# Patient Record
Sex: FEMALE | Race: ASIAN | NOT HISPANIC OR LATINO | Employment: FULL TIME | URBAN - METROPOLITAN AREA
[De-identification: names, ages, dates, MRNs, and addresses within clinical notes are randomized per-mention and may not be internally consistent; named-entity substitution may affect disease eponyms.]

---

## 2017-04-12 ENCOUNTER — TELEPHONE (OUTPATIENT)
Dept: FAMILY MEDICINE | Facility: CLINIC | Age: 52
End: 2017-04-12

## 2017-04-12 NOTE — LETTER
[facility]  [address], [address]  [place] [facility] [address] [phone]  April 26, 2017    Vikki Medina  [address]  [facility] [address]      Dear Vikki,    I care about your health and have reviewed your health plan.  I have reviewed your medical conditions, medication list, and lab results and am making recommendations  based on this review, to better manage your health.    You are particularly in need of attention regarding:  -Cholesterol and -Diabetes    I am recommending that you:  -schedule a FOLLOWUP OFFICE APPOINTMENT with me.  I will recheck your: A1c, Lipids (fasting cholesterol - nothing to eat except water and/or meds for 8+ hours), Microablumin, TSH (thyroid test) and Creatinine tests.      Here is a list of Health Maintenance topics that are due now or due soon:  Health Maintenance Due   Topic Date Due     FOOT EXAM Q1 YEAR( NO INBASKET)  06/16/1968     EYE EXAM Q1 YEAR( NO INBASKET)  06/16/1968     MICROALBUMIN Q1 YEAR( NO INBASKET)  06/16/1968     PNEUMOVAX 1X HI RISK PATIENT < 65 (NO IB MSG)  06/16/1969     CREATININE Q1 YEAR (NO INBASKET)  10/09/2014     LIPID MONITORING Q1 YEAR( NO INBASKET)  10/09/2014     A1C Q6 MO( NO INBASKET)  06/19/2015     TSH W/ FREE T4 REFLEX Q2 YEAR (NO INBASKET)  10/09/2015       Please call us at [phone] (or use [employer]) to address the above   recommendations.    Thank you for trusting [facility] and we appreciate the opportunity to serve you.  We look forward to supporting your healthcare needs in the future.    Healthy Regards,    Tata Belle MD

## 2017-05-11 ENCOUNTER — RADIANT APPOINTMENT (OUTPATIENT)
Dept: MAMMOGRAPHY | Facility: CLINIC | Age: 52
End: 2017-05-11
Attending: NURSE PRACTITIONER
Payer: COMMERCIAL

## 2017-05-11 DIAGNOSIS — Z00.00 ROUTINE GENERAL MEDICAL EXAMINATION AT A HEALTH CARE FACILITY: ICD-10-CM

## 2017-05-11 PROCEDURE — G0202 SCR MAMMO BI INCL CAD: HCPCS | Mod: TC

## 2017-07-31 ENCOUNTER — TELEPHONE (OUTPATIENT)
Dept: FAMILY MEDICINE | Facility: CLINIC | Age: 52
End: 2017-07-31

## 2017-07-31 NOTE — TELEPHONE ENCOUNTER
Panel Management Review      Patient has the following on her problem list:     Diabetes    ASA: Failed    Last A1C  Lab Results   Component Value Date    A1C 6.6 12/19/2014    A1C 6.1 10/10/2013     A1C tested: FAILED    Last LDL:    Lab Results   Component Value Date    CHOL 186 10/09/2013     Lab Results   Component Value Date    HDL 52 10/09/2013     Lab Results   Component Value Date     10/09/2013     Lab Results   Component Value Date    TRIG 115 10/09/2013     Lab Results   Component Value Date    CHOLHDLRATIO 3.6 10/09/2013     No results found for: NHDL    Is the patient on a Statin? NO             Is the patient on Aspirin? NO        Last three blood pressure readings:  BP Readings from Last 3 Encounters:   12/15/16 (!) 160/100   12/19/14 124/84   10/10/13 132/80       Date of last diabetes office visit: 10/25/15     Tobacco History:     History   Smoking Status     Current Every Day Smoker     Types: Cigarettes   Smokeless Tobacco     Never Used     Comment: 4-5 per day             Composite cancer screening  Chart review shows that this patient is due/due soon for the following None  Summary:    Patient is due/failing the following:   Diabetes follow up     Action needed:   Patient needs office visit for diabetes follow up.    Type of outreach:    Sent [employer] message.    Questions for provider review:    None                                                                                                                                    Ciara Cooper MA     Chart routed to Care Team .

## 2017-08-28 ENCOUNTER — OFFICE VISIT (OUTPATIENT)
Dept: FAMILY MEDICINE | Facility: CLINIC | Age: 52
End: 2017-08-28
Payer: COMMERCIAL

## 2017-08-28 VITALS
HEIGHT: 61 IN | TEMPERATURE: 98.8 F | SYSTOLIC BLOOD PRESSURE: 170 MMHG | WEIGHT: 193 LBS | DIASTOLIC BLOOD PRESSURE: 106 MMHG | RESPIRATION RATE: 20 BRPM | BODY MASS INDEX: 36.44 KG/M2 | HEART RATE: 80 BPM

## 2017-08-28 DIAGNOSIS — K64.4 EXTERNAL HEMORRHOIDS: ICD-10-CM

## 2017-08-28 DIAGNOSIS — E11.9 TYPE 2 DIABETES MELLITUS WITHOUT COMPLICATION, WITHOUT LONG-TERM CURRENT USE OF INSULIN (H): Primary | ICD-10-CM

## 2017-08-28 DIAGNOSIS — I10 ESSENTIAL HYPERTENSION: ICD-10-CM

## 2017-08-28 DIAGNOSIS — F17.200 SMOKER: ICD-10-CM

## 2017-08-28 DIAGNOSIS — E66.01 MORBID OBESITY, UNSPECIFIED OBESITY TYPE (H): ICD-10-CM

## 2017-08-28 DIAGNOSIS — Z13.89 SCREENING FOR DIABETIC PERIPHERAL NEUROPATHY: ICD-10-CM

## 2017-08-28 DIAGNOSIS — Z13.5 SCREENING FOR DIABETIC RETINOPATHY: ICD-10-CM

## 2017-08-28 LAB
ERYTHROCYTE [DISTWIDTH] IN BLOOD BY AUTOMATED COUNT: 12.9 % (ref 10–15)
HBA1C MFR BLD: 6.6 % (ref 4.3–6)
HCT VFR BLD AUTO: 42.3 % (ref 35–47)
HGB BLD-MCNC: 14.2 G/DL (ref 11.7–15.7)
MCH RBC QN AUTO: 29.2 PG (ref 26.5–33)
MCHC RBC AUTO-ENTMCNC: 33.6 G/DL (ref 31.5–36.5)
MCV RBC AUTO: 87 FL (ref 78–100)
PLATELET # BLD AUTO: 272 10E9/L (ref 150–450)
RBC # BLD AUTO: 4.86 10E12/L (ref 3.8–5.2)
WBC # BLD AUTO: 8.9 10E9/L (ref 4–11)

## 2017-08-28 PROCEDURE — 84443 ASSAY THYROID STIM HORMONE: CPT | Performed by: PHYSICIAN ASSISTANT

## 2017-08-28 PROCEDURE — 99215 OFFICE O/P EST HI 40 MIN: CPT | Performed by: PHYSICIAN ASSISTANT

## 2017-08-28 PROCEDURE — 85027 COMPLETE CBC AUTOMATED: CPT | Performed by: PHYSICIAN ASSISTANT

## 2017-08-28 PROCEDURE — 36415 COLL VENOUS BLD VENIPUNCTURE: CPT | Performed by: PHYSICIAN ASSISTANT

## 2017-08-28 PROCEDURE — 80053 COMPREHEN METABOLIC PANEL: CPT | Performed by: PHYSICIAN ASSISTANT

## 2017-08-28 PROCEDURE — 83036 HEMOGLOBIN GLYCOSYLATED A1C: CPT | Performed by: PHYSICIAN ASSISTANT

## 2017-08-28 PROCEDURE — 99207 C FOOT EXAM  NO CHARGE: CPT | Performed by: PHYSICIAN ASSISTANT

## 2017-08-28 RX ORDER — LISINOPRIL 10 MG/1
10 TABLET ORAL DAILY
Qty: 30 TABLET | Refills: 1 | Status: SHIPPED | OUTPATIENT
Start: 2017-08-28 | End: 2018-02-05

## 2017-08-28 RX ORDER — NICOTINE 21 MG/24HR
1 PATCH, TRANSDERMAL 24 HOURS TRANSDERMAL EVERY 24 HOURS
Qty: 30 PATCH | Refills: 0 | Status: SHIPPED | OUTPATIENT
Start: 2017-08-28 | End: 2018-02-05 | Stop reason: ALTCHOICE

## 2017-08-28 NOTE — NURSING NOTE
"Chief Complaint   Patient presents with     Diabetes     Blood Draw     patient is NOT fasting       Initial BP (!) 170/106 (BP Location: Right arm, Patient Position: Sitting, Cuff Size: Adult Regular)  Pulse 80  Temp 98.8  F (37.1  C) (Oral)  Resp 20  Ht 5' 1.25\" (1.556 m)  Wt 193 lb (87.5 kg)  BMI 36.17 kg/m2 Estimated body mass index is 36.17 kg/(m^2) as calculated from the following:    Height as of this encounter: 5' 1.25\" (1.556 m).    Weight as of this encounter: 193 lb (87.5 kg).  Medication Reconciliation: complete   Yola Hall MA      "

## 2017-08-28 NOTE — LETTER
Clinics[facility]  September 7, 2017      [place] [address]           Phone :  [phone]          Fax:  [phone]  Vikki Medina  [address]  [facility] [address]      Dear Vikki Florez- here are your labs.    Thyroid test normal.  Complete blood count without anemia or infection.  Liver, kidney, electrolyte normal.  Blood sugar tests showing diabetes which we talked about.    I'll see you in about a month to follow up on starting your new medications.  Don't forget to come in fasting so we can check your cholesterol.  Please call the clinic at [phone] if you have any more questions.    Sincerely,    Edenilson Hayes PA-C/che

## 2017-08-28 NOTE — PROGRESS NOTES
SUBJECTIVE:   Vikki Medina is a 52 year old female who presents to clinic today for the following health issues:    Patient originally scheduled for a physical.  She did have on in Dec 2016.  We have been attempting to reach her however for other health maintenance issues.    Elevated blood sugar follow-up  In 2014 her A1C was 6.6 and this was never follow up.  At physical in Dec she never completed labs.  Today she agreed to repeat some labs and A1C remains at 6.6%.  At first she wanted to treat with diet and exercise but we eventually agreed to start Metformin.      Patient is checking blood sugars: not at all    Diabetic concerns: None     Symptoms of hypoglycemia (low blood sugar): none     Paresthesias (numbness or burning in feet) or sores: No     Date of last diabetic eye exam: 1.5 years ago, [facility]    Hyperlipidemia Follow-Up      Rate your low fat/cholesterol diet?: not monitoring fat    Taking statin?  No    Other lipid medications/supplements?:  none    She will need lipids rechecked.  She is not fasting today.    PROBLEMS TO ADD ON...  Hemorrhoids  She has concerns today about bothersome hemorrhoids.  They bleed occasionally.  She has not tried much for them but would like a referral to GI today.    Hypertension Follow-up      Outpatient blood pressures are not being checked.    Low Salt Diet: not monitoring salt    BP Readings from Last 6 Encounters:   08/28/17 (!) 170/106   12/15/16 (!) 160/100   12/19/14 124/84   10/10/13 132/80   10/03/13 128/88   09/18/12 124/88     Blood pressures are running too high.  With the diagnosis of diabetes we will need to initiate treatment today for her.      Problem list and histories reviewed & adjusted, as indicated.  Additional history: as documented    Recent Labs   Lab Test  08/28/17   1532  08/28/17   1423  12/19/14   1128  10/10/13   1326  10/09/13   0816  10/03/13   1347   05/28/10   0958   A1C   --   6.6*  6.6*  6.1*   --    --    --    --    LDL  "  --    --    --    --   111  138*   --   118   HDL   --    --    --    --   52   --    --   63   TRIG   --    --    --    --   115   --    --   122   ALT  37   --    --    --   50  55*   --    --    CR  0.53   --    --    --   0.47*  0.42*   < >   --    GFRESTIMATED  >90   --    --    --   >90  >90   < >   --    GFRESTBLACK  >90   --    --    --   >90  >90   < >   --    POTASSIUM  4.0   --    --    --   3.6  3.7   < >   --    TSH  0.64   --    --    --   2.20   --    --   1.97    < > = values in this interval not displayed.          Reviewed and updated as needed this visit by clinical staffTobacco  Allergies  Problems  Med Hx  Surg Hx  Fam Hx  Soc Hx      Reviewed and updated as needed this visit by Provider         ROS:  Constitutional, HEENT, cardiovascular, pulmonary, gi and gu systems are negative, except as otherwise noted.      OBJECTIVE:   BP (!) 170/106 (BP Location: Right arm, Patient Position: Sitting, Cuff Size: Adult Regular)  Pulse 80  Temp 98.8  F (37.1  C) (Oral)  Resp 20  Ht 5' 1.25\" (1.556 m)  Wt 193 lb (87.5 kg)  BMI 36.17 kg/m2  Body mass index is 36.17 kg/(m^2).  GENERAL: healthy, alert and no distress  NECK: no adenopathy, no asymmetry, masses, or scars and thyroid normal to palpation  RESP: lungs clear to auscultation - no rales, rhonchi or wheezes  CV: regular rate and rhythm, normal S1 S2, no S3 or S4, no murmur, click or rub, no peripheral edema and peripheral pulses strong  ABDOMEN: soft, nontender, no hepatosplenomegaly, no masses and bowel sounds normal  MS: no gross musculoskeletal defects noted, no edema  No further exam 40 minutes total of this face to face visit was with discussion and counselling regarding diabetes, HTN, lipids, smoking and hemorrhoids equal to >%50 of visit.    Diagnostic Test Results:  Results for orders placed or performed in visit on 08/28/17   Hemoglobin A1c   Result Value Ref Range    Hemoglobin A1C 6.6 (H) 4.3 - 6.0 %   TSH WITH FREE T4 REFLEX "   Result Value Ref Range    TSH 0.64 0.40 - 4.00 mU/L   Comprehensive metabolic panel   Result Value Ref Range    Sodium 140 133 - 144 mmol/L    Potassium 4.0 3.4 - 5.3 mmol/L    Chloride 107 94 - 109 mmol/L    Carbon Dioxide 23 20 - 32 mmol/L    Anion Gap 10 3 - 14 mmol/L    Glucose 119 (H) 70 - 99 mg/dL    Urea Nitrogen 12 7 - 30 mg/dL    Creatinine 0.53 0.52 - 1.04 mg/dL    GFR Estimate >90 >60 mL/min/1.7m2    GFR Estimate If Black >90 >60 mL/min/1.7m2    Calcium 9.2 8.5 - 10.1 mg/dL    Bilirubin Total 0.3 0.2 - 1.3 mg/dL    Albumin 3.7 3.4 - 5.0 g/dL    Protein Total 7.4 6.8 - 8.8 g/dL    Alkaline Phosphatase 113 40 - 150 U/L    ALT 37 0 - 50 U/L    AST 23 0 - 45 U/L   CBC with platelets   Result Value Ref Range    WBC 8.9 4.0 - 11.0 10e9/L    RBC Count 4.86 3.8 - 5.2 10e12/L    Hemoglobin 14.2 11.7 - 15.7 g/dL    Hematocrit 42.3 35.0 - 47.0 %    MCV 87 78 - 100 fl    MCH 29.2 26.5 - 33.0 pg    MCHC 33.6 31.5 - 36.5 g/dL    RDW 12.9 10.0 - 15.0 %    Platelet Count 272 150 - 450 10e9/L       ASSESSMENT/PLAN:   1. Type 2 diabetes mellitus without complication, without long-term current use of insulin (H)  She will start today on Metformin, ASA.  She will need to need to make appointments for eye exam and diabetes education.  Follow up here in one month for med check.  - FOOT EXAM  NO CHARGE [79696.114]  - TSH WITH FREE T4 REFLEX  - OPTOMETRY REFERRAL  - Comprehensive metabolic panel  - CBC with platelets  - metFORMIN (GLUCOPHAGE) 500 MG tablet; Take 1 tablet (500 mg) by mouth daily (with dinner)  Dispense: 30 tablet; Refill: 1  - DIABETES EDUCATOR REFERRAL  - aspirin 81 MG tablet; Take 1 tablet (81 mg) by mouth daily  Dispense: 90 tablet; Refill: 0  - Albumin Random Urine Quantitative with Creat Ratio; Future    2. Morbid obesity, unspecified obesity type (H)  Discussed briefly diet and exercise.  Diabetes education will also cover.    3. Essential hypertension  She will need to follow up with nurse appointment in  one week.  This dose will likely need to be increased.  - lisinopril (PRINIVIL/ZESTRIL) 10 MG tablet; Take 1 tablet (10 mg) by mouth daily  Dispense: 30 tablet; Refill: 1    4. Smoker  She wanted assistance to quit smoking.  States she mokes only about 10 per day, mostly after eating.  - nicotine (NICODERM CQ) 14 MG/24HR 24 hr patch; Place 1 patch onto the skin every 24 hours  Dispense: 30 patch; Refill: 0  - nicotine (NICODERM CQ) 7 MG/24HR 24 hr patch; Place 1 patch onto the skin every 24 hours  Dispense: 30 patch; Refill: 0    5. External hemorrhoids    - GASTROENTEROLOGY ADULT REF CONSULT ONLY    6. Screening for diabetic retinopathy  Referral given.    7. Screening for diabetic peripheral neuropathy    - FOOT EXAM  NO CHARGE [31803.114]        Edenilson Hayes PA-C  [facility]

## 2017-08-29 LAB
ALBUMIN SERPL-MCNC: 3.7 G/DL (ref 3.4–5)
ALP SERPL-CCNC: 113 U/L (ref 40–150)
ALT SERPL W P-5'-P-CCNC: 37 U/L (ref 0–50)
ANION GAP SERPL CALCULATED.3IONS-SCNC: 10 MMOL/L (ref 3–14)
AST SERPL W P-5'-P-CCNC: 23 U/L (ref 0–45)
BILIRUB SERPL-MCNC: 0.3 MG/DL (ref 0.2–1.3)
BUN SERPL-MCNC: 12 MG/DL (ref 7–30)
CALCIUM SERPL-MCNC: 9.2 MG/DL (ref 8.5–10.1)
CHLORIDE SERPL-SCNC: 107 MMOL/L (ref 94–109)
CO2 SERPL-SCNC: 23 MMOL/L (ref 20–32)
CREAT SERPL-MCNC: 0.53 MG/DL (ref 0.52–1.04)
GFR SERPL CREATININE-BSD FRML MDRD: >90 ML/MIN/1.7M2
GLUCOSE SERPL-MCNC: 119 MG/DL (ref 70–99)
POTASSIUM SERPL-SCNC: 4 MMOL/L (ref 3.4–5.3)
PROT SERPL-MCNC: 7.4 G/DL (ref 6.8–8.8)
SODIUM SERPL-SCNC: 140 MMOL/L (ref 133–144)
TSH SERPL DL<=0.005 MIU/L-ACNC: 0.64 MU/L (ref 0.4–4)

## 2017-09-11 ENCOUNTER — TELEPHONE (OUTPATIENT)
Dept: FAMILY MEDICINE | Facility: CLINIC | Age: 52
End: 2017-09-11

## 2017-09-11 DIAGNOSIS — J06.9 VIRAL URI: ICD-10-CM

## 2017-09-11 NOTE — TELEPHONE ENCOUNTER
albuterol (PROAIR HFA/PROVENTIL HFA/VENTOLIN HFA) 108 (90 BASE) MCG/ACT Inhaler        Last Written Prescription Date: 12/15/16  Last Fill Quantity: 1, # refills: 0    Last Office Visit with G, P or [facility] prescribing provider:  8/28/2017    Future Office Visit:       Date of Last Asthma Action Plan Letter:   There are no preventive care reminders to display for this patient.   Asthma Control Test: No flowsheet data found.    Date of Last Spirometry Test:   No results found for this or any previous visit.         Marito Jalloh   09/11/17 4:39 PM

## 2017-09-11 NOTE — TELEPHONE ENCOUNTER
Per [facility] pharmacy,    A PA is required for the Nicoderm CQ patches.    Provider please advise if we should submit a PA or try an alternative.    June SAWYER RN, BSN, PHN  [place] [facility]

## 2017-09-12 NOTE — TELEPHONE ENCOUNTER
Insurance Requires Prior Authorization    Provider:  Tata Belle  Phone #: [phone]  ID#: [id number]  KEY#:[id number]  Medication/SIG: nicotine (NICODERM CQ) 14 MG/24HR 24 hr patch  nicotine (NICODERM CQ) 7 MG/24HR 24 hr patch    Pharmacy: [facility]    Prior auth submitted though Cover My Meds.    SHANTE Devi  September 12, 2017  2:28 PM

## 2017-09-12 NOTE — TELEPHONE ENCOUNTER
Diagnosis for medication is Viral URI and Bronchitis.    Routing refill request to provider for review/approval because:  A break in medication.    Taylor Zapata RN

## 2017-09-18 NOTE — TELEPHONE ENCOUNTER
Denied    Nicotine patch can be approved if:  Your pt has received tobacco cessation counseling.      SHANTE Devi  September 18, 2017  11:48 AM

## 2017-09-27 RX ORDER — ALBUTEROL SULFATE 90 UG/1
2 AEROSOL, METERED RESPIRATORY (INHALATION) EVERY 6 HOURS PRN
Qty: 1 INHALER | Refills: 0
Start: 2017-09-27

## 2017-12-15 ENCOUNTER — TRANSFERRED RECORDS (OUTPATIENT)
Dept: HEALTH INFORMATION MANAGEMENT | Facility: CLINIC | Age: 52
End: 2017-12-15

## 2018-01-08 ENCOUNTER — TELEPHONE (OUTPATIENT)
Dept: FAMILY MEDICINE | Facility: CLINIC | Age: 53
End: 2018-01-08

## 2018-01-08 NOTE — TELEPHONE ENCOUNTER
Panel Management Review      Patient has the following on her problem list:     Diabetes    ASA: Passed    Last A1C  Lab Results   Component Value Date    A1C 6.6 08/28/2017    A1C 6.6 12/19/2014    A1C 6.1 10/10/2013     A1C tested: Passed    Last LDL:    Lab Results   Component Value Date    CHOL 186 10/09/2013     Lab Results   Component Value Date    HDL 52 10/09/2013     Lab Results   Component Value Date     10/09/2013     Lab Results   Component Value Date    TRIG 115 10/09/2013     Lab Results   Component Value Date    CHOLHDLRATIO 3.6 10/09/2013     No results found for: NHDL    Is the patient on a Statin? NO             Is the patient on Aspirin? YES    Medications     Salicylates    aspirin 81 MG tablet          Last three blood pressure readings:  BP Readings from Last 3 Encounters:   08/28/17 (!) 170/106   12/15/16 (!) 160/100   12/19/14 124/84       Date of last diabetes office visit: 8/28/17     Tobacco History:     History   Smoking Status     Current Every Day Smoker     Types: Cigarettes   Smokeless Tobacco     Never Used     Comment: 4-5 per day             Composite cancer screening  Chart review shows that this patient is due/due soon for the following None  Summary:    Patient is due/failing the following:   Lipids, Microalbumin, Pneumovax, eye exam, BP CHECK and STATIN    Action needed:   Patient needs office visit for Diabetes follow up, bp check, lipids, microalbumin, pneumovax, eye exam.    Type of outreach:    Sent [employer] message.    Questions for provider review:    None                                                                                                                                    Yola Hall MA       Chart routed to Care Team .

## 2018-01-08 NOTE — LETTER
[facility]  [address], [address]  [place] [address] [phone]  January 23, 2018    Vikki Medina  [address]  [facility] [address]      Dear Vikki,    I care about your health and have reviewed your health plan.  I have reviewed your medical conditions, medication list, and lab results and am making recommendations  based on this review, to better manage your health.    You are particularly in need of attention regarding:  -Diabetes    I am recommending that you:  -schedule a FOLLOWUP OFFICE APPOINTMENT with me.  I will recheck your: Lipids (fasting cholesterol - nothing to eat except water and/or meds for 8+ hours) and Microablumin tests.      Here is a list of Health Maintenance topics that are due now or due soon:  Health Maintenance Due   Topic Date Due     EYE EXAM Q1 YEAR  06/16/1968     MICROALBUMIN Q1 YEAR  06/16/1968     PNEUMOVAX 1X HI RISK PATIENT < 65 (NO IB MSG)  06/16/1969     LIPID MONITORING Q1 YEAR  10/09/2014     INFLUENZA VACCINE (SYSTEM ASSIGNED)  09/01/2017       Please call us at [phone] (or use [employer]) to address the above   recommendations.    Thank you for trusting [facility] and we appreciate the opportunity to serve you.  We look forward to supporting your healthcare needs in the future.    Healthy Regards,     Tata Belle MD

## 2018-01-12 ENCOUNTER — TRANSFERRED RECORDS (OUTPATIENT)
Dept: HEALTH INFORMATION MANAGEMENT | Facility: CLINIC | Age: 53
End: 2018-01-12

## 2018-02-05 ENCOUNTER — OFFICE VISIT (OUTPATIENT)
Dept: FAMILY MEDICINE | Facility: CLINIC | Age: 53
End: 2018-02-05
Payer: COMMERCIAL

## 2018-02-05 VITALS
TEMPERATURE: 98.1 F | RESPIRATION RATE: 16 BRPM | HEIGHT: 61 IN | BODY MASS INDEX: 37.61 KG/M2 | HEART RATE: 72 BPM | DIASTOLIC BLOOD PRESSURE: 80 MMHG | WEIGHT: 199.2 LBS | SYSTOLIC BLOOD PRESSURE: 156 MMHG

## 2018-02-05 DIAGNOSIS — K64.4 EXTERNAL HEMORRHOIDS: ICD-10-CM

## 2018-02-05 DIAGNOSIS — E66.01 MORBID OBESITY (H): ICD-10-CM

## 2018-02-05 DIAGNOSIS — E11.9 TYPE 2 DIABETES MELLITUS WITHOUT COMPLICATION, WITHOUT LONG-TERM CURRENT USE OF INSULIN (H): ICD-10-CM

## 2018-02-05 DIAGNOSIS — Z23 NEED FOR PROPHYLACTIC VACCINATION AGAINST STREPTOCOCCUS PNEUMONIAE (PNEUMOCOCCUS): ICD-10-CM

## 2018-02-05 DIAGNOSIS — Z01.818 PREOP GENERAL PHYSICAL EXAM: Primary | ICD-10-CM

## 2018-02-05 DIAGNOSIS — I10 ESSENTIAL HYPERTENSION: ICD-10-CM

## 2018-02-05 LAB
ALBUMIN SERPL-MCNC: 3.6 G/DL (ref 3.4–5)
ALP SERPL-CCNC: 116 U/L (ref 40–150)
ALT SERPL W P-5'-P-CCNC: 20 U/L (ref 0–50)
ANION GAP SERPL CALCULATED.3IONS-SCNC: 7 MMOL/L (ref 3–14)
AST SERPL W P-5'-P-CCNC: 14 U/L (ref 0–45)
BILIRUB SERPL-MCNC: 0.2 MG/DL (ref 0.2–1.3)
BUN SERPL-MCNC: 13 MG/DL (ref 7–30)
CALCIUM SERPL-MCNC: 8.6 MG/DL (ref 8.5–10.1)
CHLORIDE SERPL-SCNC: 107 MMOL/L (ref 94–109)
CHOLEST SERPL-MCNC: 183 MG/DL
CO2 SERPL-SCNC: 26 MMOL/L (ref 20–32)
CREAT SERPL-MCNC: 0.78 MG/DL (ref 0.52–1.04)
CREAT UR-MCNC: 130 MG/DL
ERYTHROCYTE [DISTWIDTH] IN BLOOD BY AUTOMATED COUNT: 12.7 % (ref 10–15)
GFR SERPL CREATININE-BSD FRML MDRD: 78 ML/MIN/1.7M2
GLUCOSE SERPL-MCNC: 130 MG/DL (ref 70–99)
HBA1C MFR BLD: 7 % (ref 4.3–6)
HCT VFR BLD AUTO: 40.3 % (ref 35–47)
HDLC SERPL-MCNC: 61 MG/DL
HGB BLD-MCNC: 13.2 G/DL (ref 11.7–15.7)
LDLC SERPL CALC-MCNC: 89 MG/DL
MCH RBC QN AUTO: 28.5 PG (ref 26.5–33)
MCHC RBC AUTO-ENTMCNC: 32.8 G/DL (ref 31.5–36.5)
MCV RBC AUTO: 87 FL (ref 78–100)
MICROALBUMIN UR-MCNC: 248 MG/L
MICROALBUMIN/CREAT UR: 190.77 MG/G CR (ref 0–25)
NONHDLC SERPL-MCNC: 122 MG/DL
PLATELET # BLD AUTO: 241 10E9/L (ref 150–450)
POTASSIUM SERPL-SCNC: 3.6 MMOL/L (ref 3.4–5.3)
PROT SERPL-MCNC: 7 G/DL (ref 6.8–8.8)
RBC # BLD AUTO: 4.63 10E12/L (ref 3.8–5.2)
SODIUM SERPL-SCNC: 140 MMOL/L (ref 133–144)
TRIGL SERPL-MCNC: 165 MG/DL
WBC # BLD AUTO: 9.8 10E9/L (ref 4–11)

## 2018-02-05 PROCEDURE — 93000 ELECTROCARDIOGRAM COMPLETE: CPT | Performed by: FAMILY MEDICINE

## 2018-02-05 PROCEDURE — 85027 COMPLETE CBC AUTOMATED: CPT | Performed by: FAMILY MEDICINE

## 2018-02-05 PROCEDURE — 99215 OFFICE O/P EST HI 40 MIN: CPT | Mod: 25 | Performed by: FAMILY MEDICINE

## 2018-02-05 PROCEDURE — 80061 LIPID PANEL: CPT | Performed by: FAMILY MEDICINE

## 2018-02-05 PROCEDURE — 82043 UR ALBUMIN QUANTITATIVE: CPT | Performed by: FAMILY MEDICINE

## 2018-02-05 PROCEDURE — 90670 PCV13 VACCINE IM: CPT | Performed by: FAMILY MEDICINE

## 2018-02-05 PROCEDURE — 36415 COLL VENOUS BLD VENIPUNCTURE: CPT | Performed by: FAMILY MEDICINE

## 2018-02-05 PROCEDURE — 80053 COMPREHEN METABOLIC PANEL: CPT | Performed by: FAMILY MEDICINE

## 2018-02-05 PROCEDURE — 83036 HEMOGLOBIN GLYCOSYLATED A1C: CPT | Performed by: FAMILY MEDICINE

## 2018-02-05 PROCEDURE — 90471 IMMUNIZATION ADMIN: CPT | Performed by: FAMILY MEDICINE

## 2018-02-05 RX ORDER — LISINOPRIL 20 MG/1
20 TABLET ORAL DAILY
Qty: 30 TABLET | Refills: 0 | Status: SHIPPED | OUTPATIENT
Start: 2018-02-05 | End: 2018-03-04

## 2018-02-05 RX ORDER — NICOTINE 21 MG/24HR
1 PATCH, TRANSDERMAL 24 HOURS TRANSDERMAL EVERY 24 HOURS
COMMUNITY
End: 2018-10-01

## 2018-02-05 NOTE — PROGRESS NOTES
Screening Questionnaire for Adult Immunization    Are you sick today?   No   Do you have allergies to medications, food, a vaccine component or latex?   No   Have you ever had a serious reaction after receiving a vaccination?   No   Do you have a long-term health problem with heart disease, lung disease, asthma, kidney disease, metabolic disease (e.g. diabetes), anemia, or other blood disorder?   No   Do you have cancer, leukemia, HIV/AIDS, or any other immune system problem?   No   In the past 3 months, have you taken medications that affect  your immune system, such as prednisone, other steroids, or anticancer drugs; drugs for the treatment of rheumatoid arthritis, Crohn s disease, or psoriasis; or have you had radiation treatments?   No   Have you had a seizure, or a brain or other nervous system problem?   No   During the past year, have you received a transfusion of blood or blood     products, or been given immune (gamma) globulin or antiviral drug?   No   For women: Are you pregnant or is there a chance you could become        pregnant during the next month?   No   Have you received any vaccinations in the past 4 weeks?   No     Immunization questionnaire answers were all negative.        Per orders of Dr. Belle, injection of prevnar 13 given by Lisa A. Magill. Patient instructed to remain in clinic for 15 minutes afterwards, and to report any adverse reaction to me immediately.       Screening performed by Lisa A. Magill on 2/5/2018 at 12:11 PM.

## 2018-02-05 NOTE — MR AVS SNAPSHOT
After Visit Summary   2/5/2018    Vikki Medina    MRN: [id number]           Patient Information     Date Of Birth          1965        Visit Information        Provider Department      2/5/2018 10:20 AM Tata Belle MD [facility]        Today's Diagnoses     Preop general physical exam    -  1    External hemorrhoids        Morbid obesity (H)        Essential hypertension        Type 2 diabetes mellitus without complication, without long-term current use of insulin (H)        Need for prophylactic vaccination against Streptococcus pneumoniae (pneumococcus)          Care Instructions      Before Your Surgery      Call your surgeon if there is any change in your health. This includes signs of a cold or flu (such as a sore throat, runny nose, cough, rash or fever).    Do not smoke, drink alcohol or take over the counter medicine (unless your surgeon or primary care doctor tells you to) for the 24 hours before and after surgery.    If you take prescribed drugs: Follow your doctor s orders about which medicines to take and which to stop until after surgery.    Eating and drinking prior to surgery: follow the instructions from your surgeon    Take a shower or bath the night before surgery. Use the soap your surgeon gave you to gently clean your skin. If you do not have soap from your surgeon, use your regular soap. Do not shave or scrub the surgery site.  Wear clean pajamas and have clean sheets on your bed.     Continue to take Lisinopril (for high blood pressure) but NOT Metformin (for diabetes) the day of your surgery.     Return to the clinic in 1 month to talk about lab work and medication more.           Follow-ups after your visit        Follow-up notes from your care team     Return in about 4 weeks (around 3/5/2018).      Who to contact     If you have questions or need follow up information about today's clinic visit or your schedule please contact [facility] directly at [phone].  Normal or non-critical lab and imaging results will be communicated to you by MyChart, letter or phone within 4 business days after the clinic has received the results. If you do not hear from us within 7 days, please contact the clinic through [employer] or phone. If you have a critical or abnormal lab result, we will notify you by phone as soon as possible.  Submit refill requests through [employer] or call your pharmacy and they will forward the refill request to us. Please allow 3 business days for your refill to be completed.          Additional Information About Your Visit        [employer] Information     [employer] gives you secure access to your electronic health record. If you see a primary care provider, you can also send messages to your care team and make appointments. If you have questions, please call your primary care clinic.  If you do not have a primary care provider, please call [phone] and they will assist you.        Care EveryWhere ID     This is your Care EveryWhere ID. This could be used by other organizations to access your [facility] medical records  [id number]        Your Vitals Were     Pulse Temperature Respirations Height Last Period BMI (Body Mass Index)    72 98.1  F (36.7  C) (Oral) 16 5' 1.25\" (1.556 m) 01/15/2018 37.33 kg/m2       Blood Pressure from Last 3 Encounters:   02/05/18 156/80   08/28/17 (!) 170/106   12/15/16 (!) 160/100    Weight from Last 3 Encounters:   02/05/18 199 lb 3.2 oz (90.4 kg)   08/28/17 193 lb (87.5 kg)   12/15/16 184 lb (83.5 kg)              We Performed the Following     Albumin Random Urine Quantitative with Creat Ratio     CBC with platelets     Comprehensive metabolic panel     EKG 12-lead complete w/read - Clinics     HEMOGLOBIN A1C     Lipid panel reflex to direct LDL Non-fasting     PNEUMOCOCCAL VACCINE,ADULT,SQ OR IM          Today's Medication Changes          These changes are accurate as of 2/5/18 10:52 AM. "  If you have any questions, ask your nurse or doctor.               These medicines have changed or have updated prescriptions.        Dose/Directions    lisinopril 20 MG tablet   Commonly known as:  PRINIVIL/ZESTRIL   This may have changed:    - medication strength  - how much to take   Used for:  Essential hypertension   Changed by:  Tata Belle MD        Dose:  20 mg   Take 1 tablet (20 mg) by mouth daily   Quantity:  30 tablet   Refills:  0       NICODERM CQ 21 MG/24HR 24 hr patch   This may have changed:  Another medication with the same name was removed. Continue taking this medication, and follow the directions you see here.   Generic drug:  nicotine   Changed by:  Tata Belle MD        Dose:  1 patch   Place 1 patch onto the skin every 24 hours   Refills:  0            Where to get your medicines      These medications were sent to [facility]/pharmacy #[id number] - [place], [place] - [address]  [address], [facility] [address]     Phone:  [phone]     aspirin 81 MG tablet    lisinopril 20 MG tablet    metFORMIN 500 MG tablet                Primary Care Provider Office Phone # Fax #    Tata Belle [phone]       [address]   [facility] [address]        Equal Access to Services     YOUNG VELASQUEZ AH: Hadii greg ku hadasho Soomaali, waaxda luqadaha, qaybta kaalmada adeegyada, waxay kassandrain hayaan ashley bueno. So [facility] [phone].    ATENCIÓN: Si habla español, tiene a nieto disposición servicios gratuitos de asistencia lingüística. Llame al [phone].    We comply with applicable federal civil rights laws and [place] laws. We do not discriminate on the basis of race, color, national origin, age, disability, sex, sexual orientation, or gender identity.            Thank you!     Thank you for choosing [facility]  for your care. Our goal is always to provide you with excellent care. Hearing back from our patients is  one way we can continue to improve our services. Please take a few minutes to complete the written survey that you may receive in the mail after your visit with us. Thank you!             Your Updated Medication List - Protect others around you: Learn how to safely use, store and throw away your medicines at www.disposemymeds.org.          This list is accurate as of 2/5/18 10:52 AM.  Always use your most recent med list.                   Brand Name Dispense Instructions for use Diagnosis    aspirin 81 MG tablet     90 tablet    Take 1 tablet (81 mg) by mouth daily    Type 2 diabetes mellitus without complication, without long-term current use of insulin (H)       lisinopril 20 MG tablet    PRINIVIL/ZESTRIL    30 tablet    Take 1 tablet (20 mg) by mouth daily    Essential hypertension       metFORMIN 500 MG tablet    GLUCOPHAGE    90 tablet    Take 1 tablet (500 mg) by mouth daily (with dinner)    Type 2 diabetes mellitus without complication, without long-term current use of insulin (H)       NICODERM CQ 21 MG/24HR 24 hr patch   Generic drug:  nicotine      Place 1 patch onto the skin every 24 hours

## 2018-02-05 NOTE — PROGRESS NOTES
[address], [address]  [facility] [address]  [phone]  Dept: [phone]    PRE-OP EVALUATION:  Today's date: 2018    Vikki Medina (: 1965) presents for pre-operative evaluation assessment as requested by Dr. Montoya.  She requires evaluation and anesthesia risk assessment prior to undergoing surgery/procedure for treatment of hemorrhoids. Proposed procedure: hemorrhoid(s) removal    Date of Surgery/ Procedure: 18  Time of Surgery/ Procedure: 1pm  Hospital/Surgical Facility: [facility]   Fax number for surgical facility: [phone]  Primary Physician: Tata Belle  Type of Anesthesia Anticipated: to be determined    Patient has a Health Care Directive or Living Will:  NO    Preop Questions 2018   1.  Do you have a history of heart attack, stroke, stent, bypass or surgery on an artery in the head, neck, heart or legs? No   2.  Do you ever have any pain or discomfort in your chest? No   3.  Do you have a history of  Heart Failure? No   4.   Are you troubled by shortness of breath when:  walking on a level surface, or up a slight hill, or at night? No   5.  Do you currently have a cold, bronchitis or other respiratory infection? No   6.  Do you have a cough, shortness of breath, or wheezing? No   7.  Do you sometimes get pains in the calves of your legs when you walk? No   8. Do you or anyone in your family have previous history of blood clots? No   9.  Do you or does anyone in your family have a serious bleeding problem such as prolonged bleeding following surgeries or cuts? No   10. Have you ever had problems with anemia or been told to take iron pills? No   11. Have you had any abnormal blood loss such as black, tarry or bloody stools, or abnormal vaginal bleeding? No   12. Have you ever had a blood transfusion? No   13. Have you or any of your relatives ever had problems with anesthesia? No   14. Do you have sleep  apnea, excessive snoring or daytime drowsiness? No   15. Do you have any prosthetic heart valves? No   16. Do you have prosthetic joints? No   17. Is there any chance that you may be pregnant? No         HPI:                                                      Brief HPI related to upcoming procedure:     Pt is planning to go for a hemorrhoidectomy procedure on 02/08/2018 at [facility]. She previously had a complaint of painful and occasionally bleeding hemorrhoids. Pt is not sure what anesthetic is going to be used. Surgeon did say that surgery may not take place if pt's BP is as high and uncontrolled as it has been in the past.      Vaccinations  Pt already received an influenza vaccination. She accepts diabetic pneumonia vaccination today.     SOC  Pt works late at night and sometimes eats at [employer].     FHx  Pt denies any FHx of stroke or heart attack.     DIABETES - Patient has a longstanding history of DiabetesType Type II . Patient is being treated with oral agents and denies significant side effects. Control has been fair. Complicating factors include but are not limited to: high cholesterol, eye, HTN and obesity.    Started on Metformin by Edenilson Hayes on 08/28/2017. Pt is planning to go for an eye exam this Wednesday 2/07/2018.     Lab Results   Component Value Date    A1C 7.0 02/05/2018    A1C 6.6 08/28/2017    A1C 6.6 12/19/2014    A1C 6.1 10/10/2013                                                                                                      .  HYPERTENSION - Patient has longstanding history of mod-severe HTN , currently denies any symptoms referable to elevated blood pressure. Specifically denies chest pain, palpitations, dyspnea, orthopnea, PND or peripheral edema. Blood pressure readings have not been in normal range. Current medication regimen is as listed below. Patient denies any side effects of medication.         Pt not currently taking BP medications. When she finished her  bottle of Lisinopril, she did not realize that she needed a refill and had to continue on the medication. She recently quit smoking, and believes that doing so has caused her to gain a lot of weight. She had previously lost a lot of weight in 2016. Didn't have any problems or side effects while taking BP medications.                                                                                                                                                                                      .  BP Readings from Last 6 Encounters:   18 156/80   17 (!) 170/106   12/15/16 (!) 160/100   14 124/84   10/10/13 132/80   10/03/13 128/88       MEDICAL HISTORY:                                                      Patient Active Problem List    Diagnosis Date Noted     Type 2 diabetes mellitus, controlled (H) 10/25/2015     Priority: Medium     Morbid obesity (H) 10/25/2015     Priority: Medium     Hyperlipidemia with target LDL less than 100 10/25/2015     Priority: Medium     S/P colonoscopy with polypectomy 2013     Priority: Medium     Repeat in 5 yrs       Adenomatous colon polyp 2013     Priority: Medium     Smoker 2012     Priority: Medium     CARDIOVASCULAR SCREENING; LDL GOAL LESS THAN 160 10/31/2010     Priority: Medium     Vitamin D deficiency 2010     Priority: Medium      Past Medical History:   Diagnosis Date     Delivery normal     , no complications     Past Surgical History:   Procedure Laterality Date     left arm benign tumor removed       TUBAL LIGATION       Current Outpatient Prescriptions   Medication Sig Dispense Refill     nicotine (NICODERM CQ) 21 MG/24HR 24 hr patch Place 1 patch onto the skin every 24 hours       lisinopril (PRINIVIL/ZESTRIL) 20 MG tablet Take 1 tablet (20 mg) by mouth daily 30 tablet 0     metFORMIN (GLUCOPHAGE) 500 MG tablet Take 1 tablet (500 mg) by mouth daily (with dinner) 90 tablet 1     aspirin 81 MG tablet Take 1  "tablet (81 mg) by mouth daily 90 tablet 3     OTC products: None, except as noted above    Allergies   Allergen Reactions     Sulfa Drugs       Latex Allergy: NO    Social History   Substance Use Topics     Smoking status: Former Smoker     Types: Cigarettes     Quit date: 1/22/2018     Smokeless tobacco: Never Used      Comment: 4-5 per day     Alcohol use No     History   Drug Use No     This document serves as a record of the services and decisions personally performed and made by Tata Belle MD. It was created on his/her behalf by Isaiah Fatima, a trained medical scribe. The creation of this document is based the provider's statements to the medical scribe.  Scriblucio Fatima 10:49 AM, February 5, 2018  REVIEW OF SYSTEMS:                                                    C: NEGATIVE for fever, chills, change in weight  I: NEGATIVE for worrisome rashes, moles or lesions  E: NEGATIVE for irritation. Pt states she needs new eye glasses because of worsening vision.   E/M: NEGATIVE for ear, mouth and throat problems  R: NEGATIVE for significant cough or SOB  B: NEGATIVE for masses, tenderness or discharge  CV: NEGATIVE for chest pain, palpitations or peripheral edema  GI: NEGATIVE for nausea, abdominal pain, heartburn, or change in bowel habits  : NEGATIVE for frequency, dysuria, or hematuria  M: NEGATIVE for significant arthralgias or myalgia  N: NEGATIVE for weakness, dizziness or paresthesias  E: NEGATIVE for temperature intolerance, skin/hair changes  H: NEGATIVE for bleeding problems  P: NEGATIVE for changes in mood or affect    EXAM:                                                    /80 (BP Location: Right arm, Patient Position: Chair, Cuff Size: Adult Large)  Pulse 72  Temp 98.1  F (36.7  C) (Oral)  Resp 16  Ht 5' 1.25\" (1.556 m)  Wt 199 lb 3.2 oz (90.4 kg)  LMP 01/15/2018  BMI 37.33 kg/m2    GENERAL APPEARANCE: healthy, alert and no distress     EYES: EOMI, PERRL     HENT: ear canals " and TM's normal and nose and mouth without ulcers or lesions     NECK: no adenopathy, no asymmetry, masses, or scars and thyroid normal to palpation     RESP: lungs clear to auscultation - no rales, rhonchi or wheezes     CV: regular rates and rhythm, normal S1 S2, no S3 or S4 and no murmur, click or rub     ABDOMEN:  soft, nontender, no HSM or masses and bowel sounds normal     MS: extremities normal- no gross deformities noted, no evidence of inflammation in joints,  FROM in all extremities.     SKIN: no suspicious lesions or rashes     NEURO: Normal strength and tone, sensory exam grossly normal, mentation intact and  speech normal     PSYCH: mentation appears normal. and affect normal/bright     LYMPHATICS: No axillary, cervical, or supraclavicular nodes    DIAGNOSTICS:                                                    EKG: appears normal, NSR, normal axis, normal intervals, no acute ST/T changes c/w ischemia, no LVH by voltage criteria    Recent Labs   Lab Test  08/28/17   1532  08/28/17   1423  12/19/14   1128   10/09/13   0816   HGB  14.2   --    --    --   13.7   PLT  272   --    --    --   279   NA  140   --    --    --   141   POTASSIUM  4.0   --    --    --   3.6   CR  0.53   --    --    --   0.47*   A1C   --   6.6*  6.6*   < >   --     < > = values in this interval not displayed.        IMPRESSION:                                                    Reason for surgery/procedure: hemorrhoid removal  Diagnosis/reason for consult: clearance    The proposed surgical procedure is considered LOW risk.    REVISED CARDIAC RISK INDEX  The patient has the following serious cardiovascular risks for perioperative complications such as (MI, PE, VFib and 3  AV Block):  No serious cardiac risks  INTERPRETATION: 0 risks: Class I (very low risk - 0.4% complication rate)    The patient has the following additional risks for perioperative complications:  No identified additional risks      ICD-10-CM    1. Preop general  physical exam Z01.818 CBC with platelets     EKG 12-lead complete w/read - Clinics     Comprehensive metabolic panel   2. External hemorrhoids K64.4    3. Morbid obesity (H) E66.01    4. Essential hypertension I10 lisinopril (PRINIVIL/ZESTRIL) 20 MG tablet   5. Type 2 diabetes mellitus without complication, without long-term current use of insulin (H) E11.9 HEMOGLOBIN A1C     Lipid panel reflex to direct LDL Non-fasting     Albumin Random Urine Quantitative with Creat Ratio     CBC with platelets     Comprehensive metabolic panel     metFORMIN (GLUCOPHAGE) 500 MG tablet     aspirin 81 MG tablet   6. Need for prophylactic vaccination against Streptococcus pneumoniae (pneumococcus) Z23 PNEUMOCOCCAL CONJ VACCINE 13 VALENT IM     CANCELED: PNEUMOCOCCAL VACCINE,ADULT,SQ OR IM       RECOMMENDATIONS:                                                    Explained to pt that hypertension medication controls BP, doesn't cure it. Take BP medication (Lisinopril) day of surgery, do not take Metformin of surgery. Reiterated to pt that surgery could be cancelled because of high blood pressure. Recommended that pt eat more fruits and vegetables. Lab work and EKG will be performed before going into surgery.   Ok to start the aspirin and metformin after surgery date        --Patient is to take all scheduled medications on the day of surgery EXCEPT for modifications listed below.  metformin  APPROVAL GIVEN to proceed with proposed procedure, without further diagnostic evaluation     The information in this document, created by the medical scribe for me, accurately reflects the services I personally performed and the decisions made by me. I have reviewed and approved this document for accuracy prior to leaving the patient care area.  Tata Blele MD  10:38 AM, 02/05/18    Signed Electronically by: Tata Belle MD    Copy of this evaluation report is provided to requesting physician.    [facility] Preop Guidelines

## 2018-02-05 NOTE — NURSING NOTE
"Chief Complaint   Patient presents with     Pre-Op Exam     Initial /80 (BP Location: Right arm, Patient Position: Chair, Cuff Size: Adult Large)  Pulse 72  Temp 98.1  F (36.7  C) (Oral)  Resp 16  Ht 5' 1.25\" (1.556 m)  Wt 199 lb 3.2 oz (90.4 kg)  LMP 01/15/2018  BMI 37.33 kg/m2 Estimated body mass index is 37.33 kg/(m^2) as calculated from the following:    Height as of this encounter: 5' 1.25\" (1.556 m).    Weight as of this encounter: 199 lb 3.2 oz (90.4 kg).  BP completed using cuff size large RIGHT arm.    Lisa Magill, CMA    "

## 2018-02-05 NOTE — PATIENT INSTRUCTIONS
Before Your Surgery      Call your surgeon if there is any change in your health. This includes signs of a cold or flu (such as a sore throat, runny nose, cough, rash or fever).    Do not smoke, drink alcohol or take over the counter medicine (unless your surgeon or primary care doctor tells you to) for the 24 hours before and after surgery.    If you take prescribed drugs: Follow your doctor s orders about which medicines to take and which to stop until after surgery.    Eating and drinking prior to surgery: follow the instructions from your surgeon    Take a shower or bath the night before surgery. Use the soap your surgeon gave you to gently clean your skin. If you do not have soap from your surgeon, use your regular soap. Do not shave or scrub the surgery site.  Wear clean pajamas and have clean sheets on your bed.     Continue to take Lisinopril (for high blood pressure) but NOT Metformin (for diabetes) the day of your surgery.     Return to the clinic in 1 month to talk about lab work and medication more.

## 2018-02-07 ENCOUNTER — TRANSFERRED RECORDS (OUTPATIENT)
Dept: HEALTH INFORMATION MANAGEMENT | Facility: CLINIC | Age: 53
End: 2018-02-07

## 2018-02-07 LAB — RETINOPATHY: NEGATIVE

## 2018-02-08 ENCOUNTER — HOSPITAL PATHOLOGY (OUTPATIENT)
Dept: OTHER | Facility: CLINIC | Age: 53
End: 2018-02-08

## 2018-02-09 LAB — COPATH REPORT: NORMAL

## 2018-03-04 DIAGNOSIS — I10 ESSENTIAL HYPERTENSION: ICD-10-CM

## 2018-03-05 RX ORDER — LISINOPRIL 20 MG/1
TABLET ORAL
Qty: 30 TABLET | Refills: 0 | Status: SHIPPED | OUTPATIENT
Start: 2018-03-05 | End: 2018-03-26

## 2018-03-05 NOTE — TELEPHONE ENCOUNTER
Routing refill request to provider for review/approval because:  BP out of range  Had appt today, but canceled it due to illness    Luz Marina Barreto RN, BS  Clinical Nurse Triage.

## 2018-03-05 NOTE — TELEPHONE ENCOUNTER
"Requested Prescriptions   Pending Prescriptions Disp Refills     lisinopril (PRINIVIL/ZESTRIL) 20 MG tablet [Pharmacy Med Name: LISINOPRIL 20 MG TABLET]  Last Written Prescription Date:  2/5/18  Last Fill Quantity: 30 tablet,  # refills: 0   Last Office Visit with [facility], [facility] or [facility] prescribing provider:  2/5/18   Future Office Visit:    Next 5 appointments (look out 90 days)     Mar 05, 2018 10:20 AM CST   SHORT with Tata Belle MD   [facility]    [address] [place] [address], [address] [address] [phone]                  30 tablet 0     Sig: TAKE 1 TABLET (20 MG) BY MOUTH DAILY    ACE Inhibitors (Including Combos) Protocol Failed    3/4/2018  1:20 AM       Failed - Blood pressure under 140/90 in past 12 months    BP Readings from Last 3 Encounters:   02/05/18 156/80   08/28/17 (!) 170/106   12/15/16 (!) 160/100          Passed - Recent (12 mo) or future (30 days) visit within the authorizing provider's specialty    Patient had office visit in the last year or has a visit in the next 30 days with authorizing provider.  See \"Patient Info\" tab in inbasket, or \"Choose Columns\" in Meds & Orders section of the refill encounter.          Passed - Patient is age 18 or older       Passed - No active pregnancy on record       Passed - Normal serum creatinine on file in past 12 months    Recent Labs   Lab Test  02/05/18   1121   CR  0.78          Passed - Normal serum potassium on file in past 12 months    Recent Labs   Lab Test  02/05/18   1121   POTASSIUM  3.6          Passed - No positive pregnancy test in past 12 months          "

## 2018-03-16 ENCOUNTER — TRANSFERRED RECORDS (OUTPATIENT)
Dept: HEALTH INFORMATION MANAGEMENT | Facility: CLINIC | Age: 53
End: 2018-03-16

## 2018-03-26 ENCOUNTER — OFFICE VISIT (OUTPATIENT)
Dept: FAMILY MEDICINE | Facility: CLINIC | Age: 53
End: 2018-03-26
Payer: COMMERCIAL

## 2018-03-26 VITALS
OXYGEN SATURATION: 100 % | RESPIRATION RATE: 16 BRPM | TEMPERATURE: 97.9 F | SYSTOLIC BLOOD PRESSURE: 162 MMHG | DIASTOLIC BLOOD PRESSURE: 98 MMHG | WEIGHT: 198 LBS | BODY MASS INDEX: 37.11 KG/M2 | HEART RATE: 86 BPM

## 2018-03-26 DIAGNOSIS — E11.9 TYPE 2 DIABETES MELLITUS WITHOUT COMPLICATION, WITHOUT LONG-TERM CURRENT USE OF INSULIN (H): ICD-10-CM

## 2018-03-26 DIAGNOSIS — I10 ESSENTIAL HYPERTENSION: ICD-10-CM

## 2018-03-26 PROCEDURE — 99214 OFFICE O/P EST MOD 30 MIN: CPT | Performed by: FAMILY MEDICINE

## 2018-03-26 RX ORDER — LISINOPRIL 40 MG/1
40 TABLET ORAL DAILY
Qty: 90 TABLET | Refills: 0 | Status: SHIPPED | OUTPATIENT
Start: 2018-03-26 | End: 2018-05-21

## 2018-03-26 RX ORDER — METFORMIN HCL 500 MG
1000 TABLET, EXTENDED RELEASE 24 HR ORAL
Qty: 180 TABLET | Refills: 1 | Status: SHIPPED | OUTPATIENT
Start: 2018-03-26 | End: 2018-10-01

## 2018-03-26 NOTE — MR AVS SNAPSHOT
After Visit Summary   3/26/2018    Vikki Medina    MRN: [id number]           Patient Information     Date Of Birth          1965        Visit Information        Provider Department      3/26/2018 11:40 AM Tata Belle MD [facility]        Today's Diagnoses     Type 2 diabetes mellitus without complication, without long-term current use of insulin (H)        Essential hypertension          Care Instructions      Eating Heart-Healthy Food: Using the DASH Plan    Eating for your heart doesn t have to be hard or boring. You just need to know how to make healthier choices. The DASH eating plan has been developed to help you do just that. DASH stands for Dietary Approaches to Stop Hypertension. It is a plan that has been proven to be healthier for your heart and to lower your risk for high blood pressure. It can also help lower your risk for cancer, heart disease, osteoporosis, and diabetes.  Choosing from each food group  Choose foods from each of the food groups below each day. Try to get the recommended number of servings for each food group. The serving numbers are based on a diet of 2,000 calories a day. Talk to your doctor if you re unsure about your calorie needs. Along with getting the correct servings, the DASH plan also recommends a sodium intake less than 2,300 mg per day.        Grains  Servings: 6 to 8 a day  A serving is:    1 slice bread    1 ounce dry cereal    Half a cup cooked rice, pasta or cereal  Best choices: Whole grains and any grains high in fiber. Vegetables  Servings: 4 to 5 a day  A serving is:    1 cup raw leafy vegetable    Half a cup cut-up raw or cooked vegetable    Half a cup vegetable juice  Best choices: Fresh or frozen vegetables prepared without added salt or fat.   Fruits  Servings: 4 to 5 a day  A serving is:    1 medium fruit    One-quarter cup dried fruit    Half a cup fresh, frozen, or canned fruit    Half a cup of 100% fruit  juices  Best choices: A variety of fresh fruits of different colors. Whole fruits are a better choice than fruit juices. Low-fat or fat-free dairy  Servings: 2 to 3 a day  A serving is:    1 cup milk    1 cup yogurt    One and a half ounces cheese  Best choices: Skim or 1% milk, low-fat or fat-free yogurt or buttermilk, and low-fat cheeses.         Lean meats, poultry, fish  Servings: 6 or fewer a day  A serving is:    1 ounce cooked meats, poultry, or fish    1 egg  Best choices: Lean poultry and fish. Trim away visible fat. Broil, grill, roast, or boil instead of frying. Remove skin from poultry before eating. Limit how much red meat you eat.  Nuts, seeds, beans  Servings: 4 to 5 a week  A serving is:    One-third cup nuts (one and a half ounces)    2 tablespoons nut butter or seeds    Half a cup cooked dry beans or legumes  Best choices: Dry roasted nuts with no salt added, lentils, kidney beans, garbanzo beans, and whole dalton beans.   Fats and oils  Servings: 2 to 3 a day  A serving is:    1 teaspoon vegetable oil    1 teaspoon soft margarine    1 tablespoon mayonnaise    2 tablespoons salad dressing  Best choices: Nut and vegetable oils (nontropical vegetable oils), such as olive and canola oil. Sweets  Servings: 5 a week or fewer  A serving is:    1 tablespoon sugar, maple syrup, or honey    1 tablespoon jam or jelly    1 half-ounce jelly beans (about 15)    1 cup lemonade  Best choices: Dried fruit can be a satisfying sweet. Choose low-fat sweets. And watch your serving sizes!      For more on the DASH eating plan, visit:  www.nhlbi.nih.gov/health/health-topics/topics/dash   Date Last Reviewed: 6/1/2016 2000-2017 [employer]. [address] [place] [address], [place] [address]. All rights reserved. This information is not intended as a substitute for professional medical care. Always follow your healthcare professional's instructions.                Follow-ups after your visit        Follow-up notes  from your care team     Return in about 2 weeks (around 4/9/2018) for blood pressure check.      Who to contact     If you have questions or need follow up information about today's clinic visit or your schedule please contact [facility] directly at [phone].  Normal or non-critical lab and imaging results will be communicated to you by MAZhart, letter or phone within 4 business days after the clinic has received the results. If you do not hear from us within 7 days, please contact the clinic through MAZhart or phone. If you have a critical or abnormal lab result, we will notify you by phone as soon as possible.  Submit refill requests through [employer] or call your pharmacy and they will forward the refill request to us. Please allow 3 business days for your refill to be completed.          Additional Information About Your Visit        MAZhart Information     [employer] gives you secure access to your electronic health record. If you see a primary care provider, you can also send messages to your care team and make appointments. If you have questions, please call your primary care clinic.  If you do not have a primary care provider, please call [phone] and they will assist you.        Care EveryWhere ID     This is your Care EveryWhere ID. This could be used by other organizations to access your [facility] records  [id number]        Your Vitals Were     Pulse Temperature Respirations Pulse Oximetry BMI (Body Mass Index)       86 97.9  F (36.6  C) (Oral) 16 100% 37.11 kg/m2        Blood Pressure from Last 3 Encounters:   03/26/18 (!) 162/98   02/05/18 156/80   08/28/17 (!) 170/106    Weight from Last 3 Encounters:   03/26/18 198 lb (89.8 kg)   02/05/18 199 lb 3.2 oz (90.4 kg)   08/28/17 193 lb (87.5 kg)              Today, you had the following     No orders found for display         Today's Medication Changes          These changes are accurate as of 3/26/18 11:51 AM.  If you have any  questions, ask your nurse or doctor.               These medicines have changed or have updated prescriptions.        Dose/Directions    lisinopril 40 MG tablet   Commonly known as:  PRINIVIL/ZESTRIL   This may have changed:  See the new instructions.   Used for:  Essential hypertension   Changed by:  Tata Belle MD        Dose:  40 mg   Take 1 tablet (40 mg) by mouth daily   Quantity:  90 tablet   Refills:  0       * metFORMIN 500 MG tablet   Commonly known as:  GLUCOPHAGE   This may have changed:  Another medication with the same name was added. Make sure you understand how and when to take each.   Used for:  Type 2 diabetes mellitus without complication, without long-term current use of insulin (H)   Changed by:  Tata Belle MD        Dose:  500 mg   Take 1 tablet (500 mg) by mouth daily (with dinner)   Quantity:  90 tablet   Refills:  1       * metFORMIN 500 MG 24 hr tablet   Commonly known as:  GLUCOPHAGE-XR   This may have changed:  You were already taking a medication with the same name, and this prescription was added. Make sure you understand how and when to take each.   Used for:  Type 2 diabetes mellitus without complication, without long-term current use of insulin (H)   Changed by:  Tata Belle MD        Dose:  1000 mg   Take 2 tablets (1,000 mg) by mouth daily (with dinner)   Quantity:  180 tablet   Refills:  1       * Notice:  This list has 2 medication(s) that are the same as other medications prescribed for you. Read the directions carefully, and ask your doctor or other care provider to review them with you.         Where to get your medicines      These medications were sent to [facility]/pharmacy #[id number] - [place], [place] - [address]  [address]  [address], [facility] [address]     Phone:  [phone]     lisinopril 40 MG tablet    metFORMIN 500 MG 24 hr tablet                Primary Care Provider Office Phone # Fax #    Tata Belle [phone]  [phone]       [id number]   [facility] [address]        Equal Access to Services     LAYTON VELASQUEZ : Hadii aad ku hadrubiradha Sogala, waaxda luqadaha, qaybta kaalmada ashleytata, waxay idiin hayhollyvannessa garbergeoffankush bueno. So [facility] [phone].    ATENCIÓN: Si habla español, tiene a nieto disposición servicios gratuitos de asistencia lingüística. Llame al [phone].    We comply with applicable federal civil rights laws and [place] laws. We do not discriminate on the basis of race, color, national origin, age, disability, sex, sexual orientation, or gender identity.            Thank you!     Thank you for choosing [facility]  for your care. Our goal is always to provide you with excellent care. Hearing back from our patients is one way we can continue to improve our services. Please take a few minutes to complete the written survey that you may receive in the mail after your visit with us. Thank you!             Your Updated Medication List - Protect others around you: Learn how to safely use, store and throw away your medicines at www.disposemymeds.org.          This list is accurate as of 3/26/18 11:51 AM.  Always use your most recent med list.                   Brand Name Dispense Instructions for use Diagnosis    aspirin 81 MG tablet     90 tablet    Take 1 tablet (81 mg) by mouth daily    Type 2 diabetes mellitus without complication, without long-term current use of insulin (H)       lisinopril 40 MG tablet    PRINIVIL/ZESTRIL    90 tablet    Take 1 tablet (40 mg) by mouth daily    Essential hypertension       * metFORMIN 500 MG tablet    GLUCOPHAGE    90 tablet    Take 1 tablet (500 mg) by mouth daily (with dinner)    Type 2 diabetes mellitus without complication, without long-term current use of insulin (H)       * metFORMIN 500 MG 24 hr tablet    GLUCOPHAGE-XR    180 tablet    Take 2 tablets (1,000 mg) by mouth daily (with dinner)    Type 2 diabetes mellitus without  complication, without long-term current use of insulin (H)       NICODERM CQ 21 MG/24HR 24 hr patch   Generic drug:  nicotine      Place 1 patch onto the skin every 24 hours        * Notice:  This list has 2 medication(s) that are the same as other medications prescribed for you. Read the directions carefully, and ask your doctor or other care provider to review them with you.

## 2018-03-26 NOTE — PATIENT INSTRUCTIONS
Eating Heart-Healthy Food: Using the DASH Plan    Eating for your heart doesn t have to be hard or boring. You just need to know how to make healthier choices. The DASH eating plan has been developed to help you do just that. DASH stands for Dietary Approaches to Stop Hypertension. It is a plan that has been proven to be healthier for your heart and to lower your risk for high blood pressure. It can also help lower your risk for cancer, heart disease, osteoporosis, and diabetes.  Choosing from each food group  Choose foods from each of the food groups below each day. Try to get the recommended number of servings for each food group. The serving numbers are based on a diet of 2,000 calories a day. Talk to your doctor if you re unsure about your calorie needs. Along with getting the correct servings, the DASH plan also recommends a sodium intake less than 2,300 mg per day.        Grains  Servings: 6 to 8 a day  A serving is:    1 slice bread    1 ounce dry cereal    Half a cup cooked rice, pasta or cereal  Best choices: Whole grains and any grains high in fiber. Vegetables  Servings: 4 to 5 a day  A serving is:    1 cup raw leafy vegetable    Half a cup cut-up raw or cooked vegetable    Half a cup vegetable juice  Best choices: Fresh or frozen vegetables prepared without added salt or fat.   Fruits  Servings: 4 to 5 a day  A serving is:    1 medium fruit    One-quarter cup dried fruit    Half a cup fresh, frozen, or canned fruit    Half a cup of 100% fruit juices  Best choices: A variety of fresh fruits of different colors. Whole fruits are a better choice than fruit juices. Low-fat or fat-free dairy  Servings: 2 to 3 a day  A serving is:    1 cup milk    1 cup yogurt    One and a half ounces cheese  Best choices: Skim or 1% milk, low-fat or fat-free yogurt or buttermilk, and low-fat cheeses.         Lean meats, poultry, fish  Servings: 6 or fewer a day  A serving is:    1 ounce cooked meats, poultry, or fish    1  egg  Best choices: Lean poultry and fish. Trim away visible fat. Broil, grill, roast, or boil instead of frying. Remove skin from poultry before eating. Limit how much red meat you eat.  Nuts, seeds, beans  Servings: 4 to 5 a week  A serving is:    One-third cup nuts (one and a half ounces)    2 tablespoons nut butter or seeds    Half a cup cooked dry beans or legumes  Best choices: Dry roasted nuts with no salt added, lentils, kidney beans, garbanzo beans, and whole dalton beans.   Fats and oils  Servings: 2 to 3 a day  A serving is:    1 teaspoon vegetable oil    1 teaspoon soft margarine    1 tablespoon mayonnaise    2 tablespoons salad dressing  Best choices: Nut and vegetable oils (nontropical vegetable oils), such as olive and canola oil. Sweets  Servings: 5 a week or fewer  A serving is:    1 tablespoon sugar, maple syrup, or honey    1 tablespoon jam or jelly    1 half-ounce jelly beans (about 15)    1 cup lemonade  Best choices: Dried fruit can be a satisfying sweet. Choose low-fat sweets. And watch your serving sizes!      For more on the DASH eating plan, visit:  www.nhlbi.nih.gov/health/health-topics/topics/dash   Date Last Reviewed: 6/1/2016 2000-2017 [employer]. [address] [place] [address], [place] [address]. All rights reserved. This information is not intended as a substitute for professional medical care. Always follow your healthcare professional's instructions.

## 2018-03-26 NOTE — PROGRESS NOTES
"  SUBJECTIVE:   Vikki Medina is a 52 year old female who presents to clinic today for the following health issues:      History of Present Illness     Diet:  Regular (no restrictions)  Frequency of exercise:  2-3 days/week  Duration of exercise:  15-30 minutes  Taking medications regularly:  Yes  Medication side effects:  None  Additional concerns today:  No    Patient here to discuss recent lab results, diabetes check, kidney function, and hypertension.     SHx  Pt recently had a surgery for hemerrhoid removal. Was given Vicodin to manage pain following surgery. Was not a pleasant recovery, but pt is glad she had the surgery performed.     Kidneys    Kidney function largely unchanged since last visit, other than micro-albumin positive.     Hypertension   Blood pressure readings continue to run high. Self-reported values have been \"up and down.\" Pt wants to try eating more garlic to lower BP. Is taking her BP med every day now, no side effects and does not skip.    BP Readings from Last 6 Encounters:   03/26/18 (!) 162/98   02/05/18 156/80   08/28/17 (!) 170/106   12/15/16 (!) 160/100   12/19/14 124/84   10/10/13 132/80     Diabetes  Pt reports that she has a diabetes educator health coaching at work that she wants to start utilizing. Remains on Metformin, no side effects. Had a diabetic eye exam this year already-normal.    Lab Results   Component Value Date    A1C 7.0 02/05/2018    A1C 6.6 08/28/2017    A1C 6.6 12/19/2014    A1C 6.1 10/10/2013     Weight loss, diet, and exercise  Was previously exercising at [employer], but fell off a little bit during the holidays and her surgery; is interested in beginning to exercise again. Has not tried [employer] in the past. Diet at work is not very good, especially when she eats foods that coworkers bring in for everyone. Drinks water, used to drink soda but not anymore. Believes that quitting smoking has contributed to weight gain.     Problem list and " histories reviewed & adjusted, as indicated.  Additional history: as documented        Recent Labs   Lab Test  02/05/18   1121  08/28/17   1532  08/28/17   1423  12/19/14   1128   10/09/13   0816  10/03/13   1347   05/28/10   0958   A1C  7.0*   --   6.6*  6.6*   < >   --    --    --    --    LDL  89   --    --    --    --   111  138*   --   118   HDL  61   --    --    --    --   52   --    --   63   TRIG  165*   --    --    --    --   115   --    --   122   ALT  20  37   --    --    --   50  55*   < >   --    CR  0.78  0.53   --    --    --   0.47*  0.42*   < >   --    GFRESTIMATED  78  >90   --    --    --   >90  >90   < >   --    GFRESTBLACK  >90  >90   --    --    --   >90  >90   < >   --    POTASSIUM  3.6  4.0   --    --    --   3.6  3.7   < >   --    TSH   --   0.64   --    --    --   2.20   --    --   1.97    < > = values in this interval not displayed.      BP Readings from Last 3 Encounters:   03/26/18 (!) 162/98   02/05/18 156/80   08/28/17 (!) 170/106    Wt Readings from Last 3 Encounters:   03/26/18 89.8 kg (198 lb)   02/05/18 90.4 kg (199 lb 3.2 oz)   08/28/17 87.5 kg (193 lb)         Labs reviewed in EPIC    ROS:  Constitutional, HEENT, cardiovascular, pulmonary, gi and gu systems are negative, except as otherwise noted.    This document serves as a record of the services and decisions personally performed and made by Tata Belle MD. It was created on his/her behalf by Isaiah Fatima, a trained medical scribe. The creation of this document is based the provider's statements to the medical scribe.  Nakul Fatima 11:36 AM, March 26, 2018  OBJECTIVE:     BP (!) 162/98 (BP Location: Right arm, Patient Position: Sitting, Cuff Size: Adult Large)  Pulse 86  Temp 97.9  F (36.6  C) (Oral)  Resp 16  Wt 89.8 kg (198 lb)  SpO2 100%  BMI 37.11 kg/m2  Body mass index is 37.11 kg/(m^2).  GENERAL: healthy, alert and no distress  EYES: Eyes grossly normal to inspection, conjunctivae and sclerae  normal  MS: no gross musculoskeletal defects noted, no edema  SKIN: no suspicious lesions or rashes  NEURO: Normal strength and tone, mentation intact and speech normal  PSYCH: mentation appears normal, affect normal/bright        ASSESSMENT/PLAN:   (E11.9) Type 2 diabetes mellitus without complication, without long-term current use of insulin (H)  Comment: controlled, but Hb A1C values increasing (worsening), pt remains on Metformin. Recommend increasing Metformin to twice a day to try to control A1C values. Long-term goal is to keep Hb A1C less than 8. Utilize diabetic education program through work.   Follow up every 6 months  Plan: metFORMIN (GLUCOPHAGE-XR) 500 MG 24 hr tablet            (I10) Essential hypertension  Comment: Uncontrolled on current dose of Lisinopril, increase dose of medication, expected to improve kidney function as well.   Plan: lisinopril (PRINIVIL/ZESTRIL) 40 MG tablet  Recheck in 2 wks          Advised pt on diet and exercise, DASH diet discussed and printout provided. Encouraged pt to continue going to [employer] for exercise or walks outside as weather improves, fresh fruits and vegetables, avoid foods and drinks with added sugar. Improved diet and exercise will improve all conditions.         The information in this document, created by the medical scribe for me, accurately reflects the services I personally performed and the decisions made by me. I have reviewed and approved this document for accuracy prior to leaving the patient care area.  Tata Belle MD  11:36 AM, 03/26/18    Tata Belle MD  [facility]

## 2018-04-16 ENCOUNTER — TELEPHONE (OUTPATIENT)
Dept: FAMILY MEDICINE | Facility: CLINIC | Age: 53
End: 2018-04-16

## 2018-04-16 NOTE — LETTER
[address], [address]  [facility] [id number] [phone]  May 4, 2018    Vikki Medina  [address]  [facility] [address]      Dear Vikki,    I care about your health and have reviewed your health plan.  I have reviewed your medical conditions, medication list, and lab results and am making recommendations  based on this review, to better manage your health.    You are particularly in need of attention regarding:  -High Blood Pressure    I am recommending that you:  -schedule a NURSE-ONLY BLOOD PRESSURE APPOINTMENT within the next 1-4 weeks.      Here is a list of Health Maintenance topics that are due now or due soon:  Health Maintenance Due   Topic Date Due     HIV SCREEN (SYSTEM ASSIGNED)  06/16/1985       Please call us at [phone] (or use [employer]) to address the above   recommendations.    Thank you for trusting [facility] and we appreciate the opportunity to serve you.  We look forward to supporting your healthcare needs in the future.    Healthy Regards,    Tata Belle MD/Lisa Magill, CMA

## 2018-04-20 NOTE — TELEPHONE ENCOUNTER
Panel Management Review      Patient has the following on her problem list:     Diabetes    ASA: Passed    Last A1C  Lab Results   Component Value Date    A1C 7.0 02/05/2018    A1C 6.6 08/28/2017    A1C 6.6 12/19/2014    A1C 6.1 10/10/2013     A1C tested: FAILED    Last LDL:    Lab Results   Component Value Date    CHOL 183 02/05/2018     Lab Results   Component Value Date    HDL 61 02/05/2018     Lab Results   Component Value Date    LDL 89 02/05/2018     Lab Results   Component Value Date    TRIG 165 02/05/2018     Lab Results   Component Value Date    CHOLHDLRATIO 3.6 10/09/2013     Lab Results   Component Value Date    NHDL 122 02/05/2018       Is the patient on a Statin? NO             Is the patient on Aspirin? YES    Medications     Salicylates    aspirin 81 MG tablet          Last three blood pressure readings:  BP Readings from Last 3 Encounters:   03/26/18 (!) 162/98   02/05/18 156/80   08/28/17 (!) 170/106       Date of last diabetes office visit: 03/26/18     Tobacco History:     History   Smoking Status     Former Smoker     Types: Cigarettes     Quit date: 1/22/2018   Smokeless Tobacco     Never Used     Comment: 4-5 per day           Composite cancer screening  Chart review shows that this patient is due/due soon for the following None  Summary:    Patient is due/failing the following:   BP CHECK    Action needed:   Patient needs nurse only BLOOD PRESSURE check appointment.    Type of outreach:    Phone, left message for patient to call back.     Questions for provider review:    None                                                                                                                                    Lisa Magill, CMA       Chart routed to Care Team .

## 2018-07-26 ENCOUNTER — TELEPHONE (OUTPATIENT)
Dept: FAMILY MEDICINE | Facility: CLINIC | Age: 53
End: 2018-07-26

## 2018-07-26 NOTE — TELEPHONE ENCOUNTER
Panel Management Review      Patient has the following on her problem list:     Diabetes    ASA: Passed    Last A1C  Lab Results   Component Value Date    A1C 7.0 02/05/2018    A1C 6.6 08/28/2017    A1C 6.6 12/19/2014    A1C 6.1 10/10/2013     A1C tested: FAILED    Last LDL:    Lab Results   Component Value Date    CHOL 183 02/05/2018     Lab Results   Component Value Date    HDL 61 02/05/2018     Lab Results   Component Value Date    LDL 89 02/05/2018     Lab Results   Component Value Date    TRIG 165 02/05/2018     Lab Results   Component Value Date    CHOLHDLRATIO 3.6 10/09/2013     Lab Results   Component Value Date    NHDL 122 02/05/2018       Is the patient on a Statin? NO,STATIN NOT PRESCRIBED, INTENTIONAL            Is the patient on Aspirin? YES    Medications     Salicylates    aspirin 81 MG tablet          Last three blood pressure readings:  BP Readings from Last 3 Encounters:   03/26/18 (!) 162/98   02/05/18 156/80   08/28/17 (!) 170/106       Date of last diabetes office visit: 03/26/18     Tobacco History:     History   Smoking Status     Former Smoker     Types: Cigarettes     Quit date: 1/22/2018   Smokeless Tobacco     Never Used     Comment: 4-5 per day         Hypertension   Last three blood pressure readings:  BP Readings from Last 3 Encounters:   03/26/18 (!) 162/98   02/05/18 156/80   08/28/17 (!) 170/106     Blood pressure: FAILED    HTN Guidelines:  Age 18-59 BP range:  Less than 140/90  Age 60-85 with Diabetes:  Less than 140/90  Age 60-85 without Diabetes:  less than 150/90      Composite cancer screening  Chart review shows that this patient is due/due soon for the following None  Summary:    Patient is due/failing the following:   A1C, BP CHECK and FOLLOW UP    Action needed:   Patient needs office visit for DIABETES MANAGEMENT.    Type of outreach:    Sent letter.    Questions for provider review:    None                                                                                                                                     Lisa Magill, [facility]       Chart routed to Provider .

## 2018-07-26 NOTE — LETTER
[address], [address]  [facility] [id number] [phone]  July 26, 2018    Vikki Medina  [id number]  [facility] [address]      Dear Vikki,    I care about your health and have reviewed your health plan.  I have reviewed your medical conditions, medication list, and lab results and am making recommendations  based on this review, to better manage your health.    You are particularly in need of attention regarding:  -Diabetes and -High Blood Pressure    I am recommending that you:  -schedule a FOLLOWUP OFFICE APPOINTMENT with me.  I will recheck your: A1c test.  Here is a list of Health Maintenance topics that are due now or due soon:  Health Maintenance Due   Topic Date Due     HIV SCREEN (SYSTEM ASSIGNED)  06/16/1985     A1C Q6 MO  08/05/2018       Please call us at [phone] (or use [email or web address]) to address the above   recommendations.    Thank you for trusting [facility] and we appreciate the opportunity to serve you.  We look forward to supporting your healthcare needs in the future.    Healthy Regards,    Tata Belle MD/ranjit

## 2018-09-05 DIAGNOSIS — I10 ESSENTIAL HYPERTENSION: ICD-10-CM

## 2018-09-05 NOTE — TELEPHONE ENCOUNTER
"Requested Prescriptions   Pending Prescriptions Disp Refills     lisinopril (PRINIVIL/ZESTRIL) 40 MG tablet [Pharmacy Med Name: LISINOPRIL 40 MG TABLET] 30 tablet 0    Last Written Prescription Date:  7/23/18  Last Fill Quantity: 14,  # refills: 0   Last Office Visit: 3/26/2018   Future Office Visit:    Next 5 appointments (look out 90 days)     Sep 28, 2018  9:40 AM CDT   [facility] Belle MD   [facility]    [address] [place] [address], [address] [place] [id number] [phone]                  Sig: TAKE 1 TABLET BY MOUTH EVERY DAY    ACE Inhibitors (Including Combos) Protocol Failed    9/5/2018  2:58 PM       Failed - Blood pressure under 140/90 in past 12 months    BP Readings from Last 3 Encounters:   03/26/18 (!) 162/98   02/05/18 156/80   08/28/17 (!) 170/106                Passed - Recent (12 mo) or future (30 days) visit within the authorizing provider's specialty    Patient had office visit in the last 12 months or has a visit in the next 30 days with authorizing provider or within the authorizing provider's specialty.  See \"Patient Info\" tab in inbasket, or \"Choose Columns\" in Meds & Orders section of the refill encounter.           Passed - Patient is age 18 or older       Passed - No active pregnancy on record       Passed - Normal serum creatinine on file in past 12 months    Recent Labs   Lab Test  02/05/18   1121   CR  0.78            Passed - Normal serum potassium on file in past 12 months    Recent Labs   Lab Test  02/05/18   1121   POTASSIUM  3.6            Passed - No positive pregnancy test in past 12 months          "

## 2018-09-06 RX ORDER — LISINOPRIL 40 MG/1
TABLET ORAL
Qty: 30 TABLET | Refills: 0 | Status: SHIPPED | OUTPATIENT
Start: 2018-09-06 | End: 2018-10-04

## 2018-09-06 NOTE — TELEPHONE ENCOUNTER
Patient very difficult to get a hold of.  Does not return our call or respond to our letters.  Patient does have an appointment scheduled 9/28/2018 for physical exam with Dr. Belle.  Will get BP evaluated then.  Please approve refill to get her through until then.  Patient is out of her medication.    Taylor Zapata RN

## 2018-10-01 ENCOUNTER — OFFICE VISIT (OUTPATIENT)
Dept: FAMILY MEDICINE | Facility: CLINIC | Age: 53
End: 2018-10-01
Payer: COMMERCIAL

## 2018-10-01 VITALS
RESPIRATION RATE: 20 BRPM | SYSTOLIC BLOOD PRESSURE: 136 MMHG | BODY MASS INDEX: 36.92 KG/M2 | DIASTOLIC BLOOD PRESSURE: 88 MMHG | WEIGHT: 197 LBS | TEMPERATURE: 97.5 F | HEART RATE: 84 BPM

## 2018-10-01 DIAGNOSIS — E11.9 TYPE 2 DIABETES MELLITUS WITHOUT COMPLICATION, WITHOUT LONG-TERM CURRENT USE OF INSULIN (H): ICD-10-CM

## 2018-10-01 DIAGNOSIS — I10 ESSENTIAL HYPERTENSION: ICD-10-CM

## 2018-10-01 DIAGNOSIS — Z13.89 SCREENING FOR DIABETIC PERIPHERAL NEUROPATHY: ICD-10-CM

## 2018-10-01 DIAGNOSIS — Z23 NEED FOR PROPHYLACTIC VACCINATION AND INOCULATION AGAINST INFLUENZA: ICD-10-CM

## 2018-10-01 DIAGNOSIS — E55.9 VITAMIN D DEFICIENCY: ICD-10-CM

## 2018-10-01 DIAGNOSIS — E66.01 MORBID OBESITY (H): ICD-10-CM

## 2018-10-01 DIAGNOSIS — D12.6 ADENOMATOUS POLYP OF COLON, UNSPECIFIED PART OF COLON: ICD-10-CM

## 2018-10-01 DIAGNOSIS — Z00.00 ROUTINE GENERAL MEDICAL EXAMINATION AT A HEALTH CARE FACILITY: Primary | ICD-10-CM

## 2018-10-01 LAB
ERYTHROCYTE [DISTWIDTH] IN BLOOD BY AUTOMATED COUNT: 12.8 % (ref 10–15)
HBA1C MFR BLD: 7.7 % (ref 0–5.6)
HCT VFR BLD AUTO: 43.9 % (ref 35–47)
HGB BLD-MCNC: 14.3 G/DL (ref 11.7–15.7)
MCH RBC QN AUTO: 28.6 PG (ref 26.5–33)
MCHC RBC AUTO-ENTMCNC: 32.6 G/DL (ref 31.5–36.5)
MCV RBC AUTO: 88 FL (ref 78–100)
PLATELET # BLD AUTO: 247 10E9/L (ref 150–450)
RBC # BLD AUTO: 5 10E12/L (ref 3.8–5.2)
WBC # BLD AUTO: 8.8 10E9/L (ref 4–11)

## 2018-10-01 PROCEDURE — 80053 COMPREHEN METABOLIC PANEL: CPT | Performed by: FAMILY MEDICINE

## 2018-10-01 PROCEDURE — 99213 OFFICE O/P EST LOW 20 MIN: CPT | Mod: 25 | Performed by: FAMILY MEDICINE

## 2018-10-01 PROCEDURE — 90682 RIV4 VACC RECOMBINANT DNA IM: CPT | Performed by: FAMILY MEDICINE

## 2018-10-01 PROCEDURE — 99396 PREV VISIT EST AGE 40-64: CPT | Mod: 25 | Performed by: FAMILY MEDICINE

## 2018-10-01 PROCEDURE — 83036 HEMOGLOBIN GLYCOSYLATED A1C: CPT | Performed by: FAMILY MEDICINE

## 2018-10-01 PROCEDURE — 36415 COLL VENOUS BLD VENIPUNCTURE: CPT | Performed by: FAMILY MEDICINE

## 2018-10-01 PROCEDURE — 82043 UR ALBUMIN QUANTITATIVE: CPT | Performed by: FAMILY MEDICINE

## 2018-10-01 PROCEDURE — 90471 IMMUNIZATION ADMIN: CPT | Performed by: FAMILY MEDICINE

## 2018-10-01 PROCEDURE — 80061 LIPID PANEL: CPT | Performed by: FAMILY MEDICINE

## 2018-10-01 PROCEDURE — 84443 ASSAY THYROID STIM HORMONE: CPT | Performed by: FAMILY MEDICINE

## 2018-10-01 PROCEDURE — 85027 COMPLETE CBC AUTOMATED: CPT | Performed by: FAMILY MEDICINE

## 2018-10-01 PROCEDURE — 82306 VITAMIN D 25 HYDROXY: CPT | Performed by: FAMILY MEDICINE

## 2018-10-01 PROCEDURE — 99207 C FOOT EXAM  NO CHARGE: CPT | Mod: 25 | Performed by: FAMILY MEDICINE

## 2018-10-01 RX ORDER — METFORMIN HCL 500 MG
1000 TABLET, EXTENDED RELEASE 24 HR ORAL 2 TIMES DAILY WITH MEALS
Qty: 360 TABLET | Refills: 1 | Status: SHIPPED | OUTPATIENT
Start: 2018-10-01 | End: 2019-04-06

## 2018-10-01 RX ORDER — LISINOPRIL AND HYDROCHLOROTHIAZIDE 12.5; 2 MG/1; MG/1
2 TABLET ORAL DAILY
Qty: 180 TABLET | Refills: 1 | Status: SHIPPED | OUTPATIENT
Start: 2018-10-01 | End: 2019-04-06

## 2018-10-01 RX ORDER — ATORVASTATIN CALCIUM 10 MG/1
10 TABLET, FILM COATED ORAL DAILY
Qty: 90 TABLET | Refills: 3 | Status: SHIPPED | OUTPATIENT
Start: 2018-10-01 | End: 2019-04-06

## 2018-10-01 RX ORDER — LISINOPRIL 40 MG/1
40 TABLET ORAL DAILY
Qty: 90 TABLET | Refills: 1 | Status: CANCELLED | OUTPATIENT
Start: 2018-10-01

## 2018-10-01 RX ORDER — METFORMIN HCL 500 MG
1000 TABLET, EXTENDED RELEASE 24 HR ORAL
Qty: 180 TABLET | Refills: 1 | Status: SHIPPED | OUTPATIENT
Start: 2018-10-01 | End: 2018-10-01

## 2018-10-01 NOTE — PATIENT INSTRUCTIONS
Preventive Health Recommendations  Female Ages 50 - 64    Yearly exam: See your health care provider every year in order to  o Review health changes.   o Discuss preventive care.    o Review your medicines if your doctor has prescribed any.      Get a Pap test every three years (unless you have an abnormal result and your provider advises testing more often).    If you get Pap tests with HPV test, you only need to test every 5 years, unless you have an abnormal result.     You do not need a Pap test if your uterus was removed (hysterectomy) and you have not had cancer.    You should be tested each year for STDs (sexually transmitted diseases) if you're at risk.     Have a mammogram every 1 to 2 years.    Have a colonoscopy at age 50, or have a yearly FIT test (stool test). These exams screen for colon cancer.      Have a cholesterol test every 5 years, or more often if advised.    Have a diabetes test (fasting glucose) every three years. If you are at risk for diabetes, you should have this test more often.     If you are at risk for osteoporosis (brittle bone disease), think about having a bone density scan (DEXA).    Shots: Get a flu shot each year. Get a tetanus shot every 10 years.    Nutrition:     Eat at least 5 servings of fruits and vegetables each day.    Eat whole-grain bread, whole-wheat pasta and brown rice instead of white grains and rice.    Get adequate Calcium and Vitamin D.     Lifestyle    Exercise at least 150 minutes a week (30 minutes a day, 5 days a week). This will help you control your weight and prevent disease.    Limit alcohol to one drink per day.    No smoking.     Wear sunscreen to prevent skin cancer.     See your dentist every six months for an exam and cleaning.    See your eye doctor every 1 to 2 years.      Preventive Health Recommendations  Female Ages 50 - 64    Yearly exam: See your health care provider every year in order to  o Review health changes.   o Discuss preventive  care.    o Review your medicines if your doctor has prescribed any.      Get a Pap test every three years (unless you have an abnormal result and your provider advises testing more often).    If you get Pap tests with HPV test, you only need to test every 5 years, unless you have an abnormal result.     You do not need a Pap test if your uterus was removed (hysterectomy) and you have not had cancer.    You should be tested each year for STDs (sexually transmitted diseases) if you're at risk.     Have a mammogram every 1 to 2 years.    Have a colonoscopy at age 50, or have a yearly FIT test (stool test). These exams screen for colon cancer.      Have a cholesterol test every 5 years, or more often if advised.    Have a diabetes test (fasting glucose) every three years. If you are at risk for diabetes, you should have this test more often.     If you are at risk for osteoporosis (brittle bone disease), think about having a bone density scan (DEXA).    Shots: Get a flu shot each year. Get a tetanus shot every 10 years.    Nutrition:     Eat at least 5 servings of fruits and vegetables each day.    Eat whole-grain bread, whole-wheat pasta and brown rice instead of white grains and rice.    Get adequate Calcium and Vitamin D.     Lifestyle    Exercise at least 150 minutes a week (30 minutes a day, 5 days a week). This will help you control your weight and prevent disease.    Limit alcohol to one drink per day.    No smoking.     Wear sunscreen to prevent skin cancer.     See your dentist every six months for an exam and cleaning.    See your eye doctor every 1 to 2 years.    Eating Heart-Healthy Food: Using the DASH Plan    Eating for your heart doesn t have to be hard or boring. You just need to know how to make healthier choices. The DASH eating plan has been developed to help you do just that. DASH stands for Dietary Approaches to Stop Hypertension. It is a plan that has been proven to be healthier for your heart and  to lower your risk for high blood pressure. It can also help lower your risk for cancer, heart disease, osteoporosis, and diabetes.  Choosing from each food group  Choose foods from each of the food groups below each day. Try to get the recommended number of servings for each food group. The serving numbers are based on a diet of 2,000 calories a day. Talk to your doctor if you re unsure about your calorie needs. Along with getting the correct servings, the DASH plan also recommends a sodium intake less than 2,300 mg per day.        Grains  Servings: 6 to 8 a day  A serving is:    1 slice bread    1 ounce dry cereal    Half a cup cooked rice, pasta or cereal  Best choices: Whole grains and any grains high in fiber. Vegetables  Servings: 4 to 5 a day  A serving is:    1 cup raw leafy vegetable    Half a cup cut-up raw or cooked vegetable    Half a cup vegetable juice  Best choices: Fresh or frozen vegetables prepared without added salt or fat.   Fruits  Servings: 4 to 5 a day  A serving is:    1 medium fruit    One-quarter cup dried fruit    Half a cup fresh, frozen, or canned fruit    Half a cup of 100% fruit juices  Best choices: A variety of fresh fruits of different colors. Whole fruits are a better choice than fruit juices. Low-fat or fat-free dairy  Servings: 2 to 3 a day  A serving is:    1 cup milk    1 cup yogurt    One and a half ounces cheese  Best choices: Skim or 1% milk, low-fat or fat-free yogurt or buttermilk, and low-fat cheeses.         Lean meats, poultry, fish  Servings: 6 or fewer a day  A serving is:    1 ounce cooked meats, poultry, or fish    1 egg  Best choices: Lean poultry and fish. Trim away visible fat. Broil, grill, roast, or boil instead of frying. Remove skin from poultry before eating. Limit how much red meat you eat.  Nuts, seeds, beans  Servings: 4 to 5 a week  A serving is:    One-third cup nuts (one and a half ounces)    2 tablespoons nut butter or seeds    Half a cup cooked dry  beans or legumes  Best choices: Dry roasted nuts with no salt added, lentils, kidney beans, garbanzo beans, and whole dalton beans.   Fats and oils  Servings: 2 to 3 a day  A serving is:    1 teaspoon vegetable oil    1 teaspoon soft margarine    1 tablespoon mayonnaise    2 tablespoons salad dressing  Best choices: Nut and vegetable oils (nontropical vegetable oils), such as olive and canola oil. Sweets  Servings: 5 a week or fewer  A serving is:    1 tablespoon sugar, maple syrup, or honey    1 tablespoon jam or jelly    1 half-ounce jelly beans (about 15)    1 cup lemonade  Best choices: Dried fruit can be a satisfying sweet. Choose low-fat sweets. And watch your serving sizes!      For more on the DASH eating plan, visit:  www.nhlbi.nih.gov/health/health-topics/topics/dash   Date Last Reviewed: 6/1/2016 2000-2017 [employer]. [address] [place] [address], [place] [address]. All rights reserved. This information is not intended as a substitute for professional medical care. Always follow your healthcare professional's instructions.

## 2018-10-01 NOTE — NURSING NOTE
"Chief Complaint   Patient presents with     Physical     Refill Request     Blood Draw     LABS.  Patient is NOT fasting     Flu Shot     Initial /90 (BP Location: Right arm, Patient Position: Chair, Cuff Size: Adult Large)  Pulse 84  Temp 97.5  F (36.4  C) (Oral)  Resp 20  Wt 197 lb (89.4 kg)  BMI 36.92 kg/m2 Estimated body mass index is 36.92 kg/(m^2) as calculated from the following:    Height as of 2/5/18: 5' 1.25\" (1.556 m).    Weight as of this encounter: 197 lb (89.4 kg).  BP completed using cuff size large RIGHT arm    Lisa Magill, CMA    "

## 2018-10-01 NOTE — MR AVS SNAPSHOT
After Visit Summary   10/1/2018    Vikki Medina    MRN: [id number]           Patient Information     Date Of Birth          1965        Visit Information        Provider Department      10/1/2018 9:00 AM Tata Belle MD [facility]        Today's Diagnoses     Routine general medical examination at a health care facility    -  1    Type 2 diabetes mellitus without complication, without long-term current use of insulin (H)        Essential hypertension        Adenomatous polyp of colon, unspecified part of colon        Screening for HIV (human immunodeficiency virus)        Screening for diabetic peripheral neuropathy        Need for prophylactic vaccination and inoculation against influenza          Care Instructions      Preventive Health Recommendations  Female Ages 50 - 64    Yearly exam: See your health care provider every year in order to  o Review health changes.   o Discuss preventive care.    o Review your medicines if your doctor has prescribed any.      Get a Pap test every three years (unless you have an abnormal result and your provider advises testing more often).    If you get Pap tests with HPV test, you only need to test every 5 years, unless you have an abnormal result.     You do not need a Pap test if your uterus was removed (hysterectomy) and you have not had cancer.    You should be tested each year for STDs (sexually transmitted diseases) if you're at risk.     Have a mammogram every 1 to 2 years.    Have a colonoscopy at age 50, or have a yearly FIT test (stool test). These exams screen for colon cancer.      Have a cholesterol test every 5 years, or more often if advised.    Have a diabetes test (fasting glucose) every three years. If you are at risk for diabetes, you should have this test more often.     If you are at risk for osteoporosis (brittle bone disease), think about having a bone density scan (DEXA).    Shots: Get a flu shot  each year. Get a tetanus shot every 10 years.    Nutrition:     Eat at least 5 servings of fruits and vegetables each day.    Eat whole-grain bread, whole-wheat pasta and brown rice instead of white grains and rice.    Get adequate Calcium and Vitamin D.     Lifestyle    Exercise at least 150 minutes a week (30 minutes a day, 5 days a week). This will help you control your weight and prevent disease.    Limit alcohol to one drink per day.    No smoking.     Wear sunscreen to prevent skin cancer.     See your dentist every six months for an exam and cleaning.    See your eye doctor every 1 to 2 years.      Preventive Health Recommendations  Female Ages 50 - 64    Yearly exam: See your health care provider every year in order to  o Review health changes.   o Discuss preventive care.    o Review your medicines if your doctor has prescribed any.      Get a Pap test every three years (unless you have an abnormal result and your provider advises testing more often).    If you get Pap tests with HPV test, you only need to test every 5 years, unless you have an abnormal result.     You do not need a Pap test if your uterus was removed (hysterectomy) and you have not had cancer.    You should be tested each year for STDs (sexually transmitted diseases) if you're at risk.     Have a mammogram every 1 to 2 years.    Have a colonoscopy at age 50, or have a yearly FIT test (stool test). These exams screen for colon cancer.      Have a cholesterol test every 5 years, or more often if advised.    Have a diabetes test (fasting glucose) every three years. If you are at risk for diabetes, you should have this test more often.     If you are at risk for osteoporosis (brittle bone disease), think about having a bone density scan (DEXA).    Shots: Get a flu shot each year. Get a tetanus shot every 10 years.    Nutrition:     Eat at least 5 servings of fruits and vegetables each day.    Eat whole-grain bread, whole-wheat pasta and brown  rice instead of white grains and rice.    Get adequate Calcium and Vitamin D.     Lifestyle    Exercise at least 150 minutes a week (30 minutes a day, 5 days a week). This will help you control your weight and prevent disease.    Limit alcohol to one drink per day.    No smoking.     Wear sunscreen to prevent skin cancer.     See your dentist every six months for an exam and cleaning.    See your eye doctor every 1 to 2 years.    Eating Heart-Healthy Food: Using the DASH Plan    Eating for your heart doesn t have to be hard or boring. You just need to know how to make healthier choices. The DASH eating plan has been developed to help you do just that. DASH stands for Dietary Approaches to Stop Hypertension. It is a plan that has been proven to be healthier for your heart and to lower your risk for high blood pressure. It can also help lower your risk for cancer, heart disease, osteoporosis, and diabetes.  Choosing from each food group  Choose foods from each of the food groups below each day. Try to get the recommended number of servings for each food group. The serving numbers are based on a diet of 2,000 calories a day. Talk to your doctor if you re unsure about your calorie needs. Along with getting the correct servings, the DASH plan also recommends a sodium intake less than 2,300 mg per day.        Grains  Servings: 6 to 8 a day  A serving is:    1 slice bread    1 ounce dry cereal    Half a cup cooked rice, pasta or cereal  Best choices: Whole grains and any grains high in fiber. Vegetables  Servings: 4 to 5 a day  A serving is:    1 cup raw leafy vegetable    Half a cup cut-up raw or cooked vegetable    Half a cup vegetable juice  Best choices: Fresh or frozen vegetables prepared without added salt or fat.   Fruits  Servings: 4 to 5 a day  A serving is:    1 medium fruit    One-quarter cup dried fruit    Half a cup fresh, frozen, or canned fruit    Half a cup of 100% fruit juices  Best choices: A variety of  fresh fruits of different colors. Whole fruits are a better choice than fruit juices. Low-fat or fat-free dairy  Servings: 2 to 3 a day  A serving is:    1 cup milk    1 cup yogurt    One and a half ounces cheese  Best choices: Skim or 1% milk, low-fat or fat-free yogurt or buttermilk, and low-fat cheeses.         Lean meats, poultry, fish  Servings: 6 or fewer a day  A serving is:    1 ounce cooked meats, poultry, or fish    1 egg  Best choices: Lean poultry and fish. Trim away visible fat. Broil, grill, roast, or boil instead of frying. Remove skin from poultry before eating. Limit how much red meat you eat.  Nuts, seeds, beans  Servings: 4 to 5 a week  A serving is:    One-third cup nuts (one and a half ounces)    2 tablespoons nut butter or seeds    Half a cup cooked dry beans or legumes  Best choices: Dry roasted nuts with no salt added, lentils, kidney beans, garbanzo beans, and whole dalton beans.   Fats and oils  Servings: 2 to 3 a day  A serving is:    1 teaspoon vegetable oil    1 teaspoon soft margarine    1 tablespoon mayonnaise    2 tablespoons salad dressing  Best choices: Nut and vegetable oils (nontropical vegetable oils), such as olive and canola oil. Sweets  Servings: 5 a week or fewer  A serving is:    1 tablespoon sugar, maple syrup, or honey    1 tablespoon jam or jelly    1 half-ounce jelly beans (about 15)    1 cup lemonade  Best choices: Dried fruit can be a satisfying sweet. Choose low-fat sweets. And watch your serving sizes!      For more on the DASH eating plan, visit:  www.nhlbi.nih.gov/health/health-topics/topics/dash   Date Last Reviewed: 6/1/2016 2000-2017 [employer]. [address] [place] [address]. All rights reserved. This information is not intended as a substitute for professional medical care. Always follow your healthcare professional's instructions.                Follow-ups after your visit        Additional Services     GASTROENTEROLOGY ADULT REF  PROCEDURE ONLY       Last Lab Result: Creatinine (mg/dL)       Date                     Value                 02/05/2018               0.78             ----------  Body mass index is 36.92 kg/(m^2).     Needed:  No  Language:  English    Patient will be contacted to schedule procedure.     Please be aware that coverage of these services is subject to the terms and limitations of your health insurance plan.  Call member services at your health plan with any benefit or coverage questions.  Any procedures must be performed at a [place] facility OR coordinated by your clinic's referral office.    Please bring the following with you to your appointment:    (1) Any X-Rays, CTs or MRIs which have been performed.  Contact the facility where they were done to arrange for  prior to your scheduled appointment.    (2) List of current medications   (3) This referral request   (4) Any documents/labs given to you for this referral                  Follow-up notes from your care team     Return in about 6 months (around 4/1/2019) for diabetes check.      Your next 10 appointments already scheduled     Oct 16, 2018 11:00 AM CDT   PHYSICAL with Tata Belle MD   [facility]    [address] [place] [address], [address]  [facility] [address] [phone]              Future tests that were ordered for you today     Open Future Orders        Priority Expected Expires Ordered    Fecal colorectal cancer screen FIT Routine 10/22/2018 12/24/2018 10/1/2018    MA Screening Digital Bilateral Routine  10/1/2019 10/1/2018            Who to contact     If you have questions or need follow up information about today's clinic visit or your schedule please contact [facility] directly at [phone].  Normal or non-critical lab and imaging results will be communicated to you by MyChart, letter or phone within 4 business days after the clinic has received the  results. If you do not hear from us within 7 days, please contact the clinic through [employer] or phone. If you have a critical or abnormal lab result, we will notify you by phone as soon as possible.  Submit refill requests through [employer] or call your pharmacy and they will forward the refill request to us. Please allow 3 business days for your refill to be completed.          Additional Information About Your Visit        [employer] Information     [employer] gives you secure access to your electronic health record. If you see a primary care provider, you can also send messages to your care team and make appointments. If you have questions, please call your primary care clinic.  If you do not have a primary care provider, please call [phone] and they will assist you.        Care EveryWhere ID     This is your Care EveryWhere ID. This could be used by other organizations to access your [place] medical records  [id number]        Your Vitals Were     Pulse Temperature Respirations BMI (Body Mass Index)          84 97.5  F (36.4  C) (Oral) 20 36.92 kg/m2         Blood Pressure from Last 3 Encounters:   10/01/18 144/90   03/26/18 (!) 162/98   02/05/18 156/80    Weight from Last 3 Encounters:   10/01/18 197 lb (89.4 kg)   03/26/18 198 lb (89.8 kg)   02/05/18 199 lb 3.2 oz (90.4 kg)              We Performed the Following     Albumin Random Urine Quantitative with Creat Ratio     CBC with platelets     Comprehensive metabolic panel     FLU VACCINE, (RIV4) RECOMBINANT HA  , IM (FluBlok, egg free) [41479]- >18 YRS (FMG recommended  50-64 YRS)     FOOT EXAM  NO CHARGE [07679.114]     GASTROENTEROLOGY ADULT REF PROCEDURE ONLY     HEMOGLOBIN A1C     Lipid panel reflex to direct LDL Fasting     TSH with free T4 reflex     Vaccine Administration, Initial [24102]     Vitamin D Deficiency          Today's Medication Changes          These changes are accurate as of 10/1/18  9:34 AM.  If you have any questions, ask your nurse or  doctor.               Start taking these medicines.        Dose/Directions    atorvastatin 10 MG tablet   Commonly known as:  LIPITOR   Used for:  Type 2 diabetes mellitus without complication, without long-term current use of insulin (H)   Started by:  Tata Belle MD        Dose:  10 mg   Take 1 tablet (10 mg) by mouth daily   Quantity:  90 tablet   Refills:  3       lisinopril-hydrochlorothiazide 20-12.5 MG per tablet   Commonly known as:  PRINZIDE/ZESTORETIC   Used for:  Essential hypertension   Started by:  Tata Belle MD        Dose:  2 tablet   Take 2 tablets by mouth daily   Quantity:  180 tablet   Refills:  1         These medicines have changed or have updated prescriptions.        Dose/Directions    lisinopril 40 MG tablet   Commonly known as:  PRINIVIL/ZESTRIL   This may have changed:  Another medication with the same name was removed. Continue taking this medication, and follow the directions you see here.   Used for:  Essential hypertension   Changed by:  Tata Belle MD        TAKE 1 TABLET BY MOUTH EVERY DAY   Quantity:  30 tablet   Refills:  0       metFORMIN 500 MG 24 hr tablet   Commonly known as:  GLUCOPHAGE-XR   This may have changed:  Another medication with the same name was removed. Continue taking this medication, and follow the directions you see here.   Used for:  Type 2 diabetes mellitus without complication, without long-term current use of insulin (H)   Changed by:  Tata Belle MD        Dose:  1000 mg   Take 2 tablets (1,000 mg) by mouth daily (with dinner)   Quantity:  180 tablet   Refills:  1            Where to get your medicines      These medications were sent to [facility]/pharmacy #[id number] - [place], [place] - [address]  [id number]  [address], [facility] [address]     Phone:  [phone]     aspirin 81 MG tablet    atorvastatin 10 MG tablet    lisinopril-hydrochlorothiazide 20-12.5 MG per tablet    metFORMIN 500 MG 24 hr tablet                 Primary Care Provider Office Phone # Fax #    Tata Belle [phone]       [id number]   [facility] [address]        Equal Access to Services     LAYTON VELASQUEZ : Hadii aad ku hadrubio Soomaali, waaxda luqadaha, qaybta kaalmada adeegyada, manny harrisonvannessa sarabiabecki reeves chirag bueno. So [facility] [phone].    ATENCIÓN: Si habla español, tiene a nieto disposición servicios gratuitos de asistencia lingüística. Llame al [phone].    We comply with applicable federal civil rights laws and [place] laws. We do not discriminate on the basis of race, color, national origin, age, disability, sex, sexual orientation, or gender identity.            Thank you!     Thank you for choosing [facility]  for your care. Our goal is always to provide you with excellent care. Hearing back from our patients is one way we can continue to improve our services. Please take a few minutes to complete the written survey that you may receive in the mail after your visit with us. Thank you!             Your Updated Medication List - Protect others around you: Learn how to safely use, store and throw away your medicines at www.disposemymeds.org.          This list is accurate as of 10/1/18  9:34 AM.  Always use your most recent med list.                   Brand Name Dispense Instructions for use Diagnosis    aspirin 81 MG tablet     90 tablet    Take 1 tablet (81 mg) by mouth daily    Type 2 diabetes mellitus without complication, without long-term current use of insulin (H)       atorvastatin 10 MG tablet    LIPITOR    90 tablet    Take 1 tablet (10 mg) by mouth daily    Type 2 diabetes mellitus without complication, without long-term current use of insulin (H)       lisinopril 40 MG tablet    PRINIVIL/ZESTRIL    30 tablet    TAKE 1 TABLET BY MOUTH EVERY DAY    Essential hypertension       lisinopril-hydrochlorothiazide 20-12.5 MG per tablet    PRINZIDE/ZESTORETIC    180 tablet    Take 2  tablets by mouth daily    Essential hypertension       metFORMIN 500 MG 24 hr tablet    GLUCOPHAGE-XR    180 tablet    Take 2 tablets (1,000 mg) by mouth daily (with dinner)    Type 2 diabetes mellitus without complication, without long-term current use of insulin (H)

## 2018-10-01 NOTE — PROGRESS NOTES
"  SUBJECTIVE:   Vikki Medina is a 53 year old female who presents to clinic today for the following health issues:  {Provider please address medication reconciliation discrepancies--rooming staff please delete if no med/rec issues}    HPI  {additional problems for roomer to add, delete if none:041473}  Problem list and histories reviewed & adjusted, as indicated.  Additional history: {NONE - AS DOCUMENTED:715459::\"as documented\"}    {ACUTE Problem SUPERLIST - brief histories:594388}    {HIST REVIEW/ LINKS 2:774579}    {PROVIDER CHARTING PREFERENCE:658597}     SUBJECTIVE:   CC: Vikki Medina is an 53 year old woman who presents for preventive health visit.     Healthy Habits:    Do you get at least three servings of calcium containing foods daily (dairy, green leafy vegetables, etc.)? {YES/NO, DAIRY INTAKE:578325::\"yes\"}    Amount of exercise or daily activities, outside of work: {AMOUNT EXERCISE:490203}    Problems taking medications regularly {Yes /No default:809220::\"No\"}    Medication side effects: {Yes /No default.:083893::\"No\"}    Have you had an eye exam in the past two years? {YESNOBLANK:619641}    Do you see a dentist twice per year? {YESNOBLANK:860097}    Do you have sleep apnea, excessive snoring or daytime drowsiness?{YESNOBLANK:073111}  {Outside tests to abstract? :874493}    {additional problems to add (Optional):390709}    Today's PHQ-2 Score:   PHQ-2 ( 1999 [employer]) 10/1/2018 3/26/2018   Q1: Little interest or pleasure in doing things 0 0   Q2: Feeling down, depressed or hopeless 0 0   PHQ-2 Score 0 0   Q1: Little interest or pleasure in doing things - Not at all   Q2: Feeling down, depressed or hopeless - Not at all   PHQ-2 Score - 0     {PHQ-2 LOOK IN ASSESSMENTS (Optional) :087110}  Abuse: Current or Past(Physical, Sexual or Emotional)- {YES/NO/NA:415574}  Do you feel safe in your environment - {YES/NO/NA:228076}    Social History   Substance Use Topics     Smoking status: Former " "Smoker     Types: Cigarettes     Quit date: 1/22/2018     Smokeless tobacco: Never Used      Comment: 4-5 per day     Alcohol use No     If you drink alcohol do you typically have >3 drinks per day or >7 drinks per week? {ETOH :786401}                     Reviewed orders with patient.  Reviewed health maintenance and updated orders accordingly - {Yes/No:816058::\"Yes\"}  {Chronicprobdata (Optional):180263}    {Mammo Decision Support (Optional):789982}    Pertinent mammograms are reviewed under the imaging tab.  History of abnormal Pap smear: {PAP HX:418823}  PAP / HPV 12/15/2016 10/3/2013 4/3/2012   PAP NIL NIL NIL     Reviewed and updated as needed this visit by clinical staff  Tobacco  Allergies  Meds  Problems  Med Hx  Surg Hx  Fam Hx  Soc Hx          Reviewed and updated as needed this visit by Provider        {HISTORY OPTIONS (Optional):093334}    ROS:  { :914597}    OBJECTIVE:   There were no vitals taken for this visit.  EXAM:  {Exam Choices:985498}    {Diagnostic Test Results (Optional):974216::\"Diagnostic Test Results:\",\"none \"}    ASSESSMENT/PLAN:   {Diag Picklist:754367}    COUNSELING:   {FEMALE COUNSELING MESSAGES:528837::\"Reviewed preventive health counseling, as reflected in patient instructions\"}    BP Readings from Last 1 Encounters:   03/26/18 (!) 162/98     Estimated body mass index is 37.11 kg/(m^2) as calculated from the following:    Height as of 2/5/18: 5' 1.25\" (1.556 m).    Weight as of 3/26/18: 198 lb (89.8 kg).    {BP Counseling- Complete if BP >= 120/80  (Optional):843294}  {Weight Management Plan (ACO) Complete if BMI is abnormal-  Ages 18-64  BMI >24.9.  Age 65+ with BMI <23 or >30 (Optional):793667}     reports that she quit smoking about 8 months ago. Her smoking use included Cigarettes. She has never used smokeless tobacco.  {Tobacco Cessation -- Complete if patient is a smoker (Optional):476650}    Counseling Resources:  ATP IV Guidelines  Pooled Cohorts Equation " Calculator  Breast Cancer Risk Calculator  FRAX Risk Assessment  ICSI Preventive Guidelines  Dietary Guidelines for Americans, 2010  [employer] MyPlate  ASA Prophylaxis  Lung CA Screening    Tata Belle MD  [facility]

## 2018-10-01 NOTE — PROGRESS NOTES
SUBJECTIVE:   CC: Vikki Medina is an 53 year old woman who presents for preventive health visit.     Healthy Habits:    Do you get at least three servings of calcium containing foods daily (dairy, green leafy vegetables, etc.)? yes    Amount of exercise or daily activities, outside of work: NONE     Problems taking medications regularly No    Medication side effects: No    Have you had an eye exam in the past two years? yes    Do you see a dentist twice per year? yes    Do you have sleep apnea, excessive snoring or daytime drowsiness?yes-SNORE    Diabetes Follow-up      Patient is checking blood sugars: not at all    Diabetic concerns: None and blood sugar frequently over 200     Symptoms of hypoglycemia (low blood sugar): none     Paresthesias (numbness or burning in feet) or sores: No     Date of last diabetic eye exam: ?    BP Readings from Last 2 Encounters:   10/01/18 136/88   03/26/18 (!) 162/98     Hemoglobin A1C (%)   Date Value   10/01/2018 7.7 (H)   02/05/2018 7.0 (H)     LDL Cholesterol Calculated (mg/dL)   Date Value   02/05/2018 89   10/09/2013 111       Diabetes Management Resources  Hypertension Follow-up      Outpatient blood pressures are not being checked.    Low Salt Diet: no added salt      Today's PHQ-2 Score:   PHQ-2 ( 1999 [employer]) 10/1/2018 3/26/2018   Q1: Little interest or pleasure in doing things 0 0   Q2: Feeling down, depressed or hopeless 0 0   PHQ-2 Score 0 0   Q1: Little interest or pleasure in doing things - Not at all   Q2: Feeling down, depressed or hopeless - Not at all   PHQ-2 Score - 0       Abuse: Current or Past(Physical, Sexual or Emotional)- NO  Do you feel safe in your environment - YES    Social History   Substance Use Topics     Smoking status: Former Smoker     Types: Cigarettes     Quit date: 1/22/2018     Smokeless tobacco: Never Used      Comment: 4-5 per day     Alcohol use No     If you drink alcohol do you typically have >3 drinks per day or >7 drinks  per week? No                     Reviewed orders with patient.  Reviewed health maintenance and updated orders accordingly - Yes  Labs reviewed in EPIC  BP Readings from Last 3 Encounters:   10/01/18 136/88   03/26/18 (!) 162/98   02/05/18 156/80    Wt Readings from Last 3 Encounters:   10/01/18 197 lb (89.4 kg)   03/26/18 198 lb (89.8 kg)   02/05/18 199 lb 3.2 oz (90.4 kg)                    Patient over age 50, mutual decision to screen reflected in health maintenance.    Pertinent mammograms are reviewed under the imaging tab.  History of abnormal Pap smear: NO - age 30- 65 PAP every 3 years recommended  PAP / HPV 12/15/2016 10/3/2013 4/3/2012   PAP NIL NIL NIL     Reviewed and updated as needed this visit by clinical staff  Tobacco  Allergies  Meds  Problems  Med Hx  Surg Hx  Fam Hx  Soc Hx          Reviewed and updated as needed this visit by Provider            ROS:  CONSTITUTIONAL: NEGATIVE for fever, chills, change in weight  INTEGUMENTARY/SKIN: NEGATIVE for worrisome rashes, moles or lesions  EYES: NEGATIVE for vision changes or irritation  ENT: NEGATIVE for ear, mouth and throat problems  RESP: NEGATIVE for significant cough or SOB  BREAST: NEGATIVE for masses, tenderness or discharge  CV: NEGATIVE for chest pain, palpitations or peripheral edema  GI: NEGATIVE for nausea, abdominal pain, heartburn, or change in bowel habits  : NEGATIVE for unusual urinary or vaginal symptoms. No vaginal bleeding.  MUSCULOSKELETAL: NEGATIVE for significant arthralgias or myalgia  NEURO: NEGATIVE for weakness, dizziness or paresthesias  PSYCHIATRIC: NEGATIVE for changes in mood or affect     OBJECTIVE:   /90 (BP Location: Right arm, Patient Position: Chair, Cuff Size: Adult Large)  Pulse 84  Temp 97.5  F (36.4  C) (Oral)  Resp 20  Wt 197 lb (89.4 kg)  BMI 36.92 kg/m2  EXAM:  GENERAL: healthy, alert and no distress  EYES: Eyes grossly normal to inspection, PERRL and conjunctivae and sclerae normal  HENT:  ear canals and TM's normal, nose and mouth without ulcers or lesions  NECK: no adenopathy, no asymmetry, masses, or scars and thyroid normal to palpation  RESP: lungs clear to auscultation - no rales, rhonchi or wheezes  BREAST: normal without masses, tenderness or nipple discharge and no palpable axillary masses or adenopathy  CV: regular rate and rhythm, normal S1 S2, no S3 or S4, no murmur, click or rub, no peripheral edema and peripheral pulses strong  ABDOMEN: soft, nontender, no hepatosplenomegaly, no masses and bowel sounds normal  MS: no gross musculoskeletal defects noted, no edema  SKIN: no suspicious lesions or rashes  NEURO: Normal strength and tone, mentation intact and speech normal  PSYCH: mentation appears normal, affect normal/bright        ASSESSMENT/PLAN:   1. Routine general medical examination at a health care facility  Normal exam, other than obesity  - Lipid panel reflex to direct LDL Fasting  - TSH with free T4 reflex  - Comprehensive metabolic panel  - CBC with platelets  - MA Screening Digital Bilateral; Future  - Vitamin D Deficiency    2. Type 2 diabetes mellitus without complication, without long-term current use of insulin (H)  Fair control, will raise metformin to BID  - HEMOGLOBIN A1C  - aspirin 81 MG tablet; Take 1 tablet (81 mg) by mouth daily  Dispense: 90 tablet; Refill: 3  - metFORMIN (GLUCOPHAGE-XR) 500 MG 24 hr tablet; Take 2 tablets (1,000 mg) by mouth daily (with dinner)  Dispense: 180 tablet; Refill: 1  - Albumin Random Urine Quantitative with Creat Ratio  - Lipid panel reflex to direct LDL Fasting  - TSH with free T4 reflex  - Comprehensive metabolic panel  - atorvastatin (LIPITOR) 10 MG tablet; Take 1 tablet (10 mg) by mouth daily  Dispense: 90 tablet; Refill: 3  - OFFICE/OUTPT VISIT,EST,LEVL III    3. Essential hypertension  Fair control , cont same  - lisinopril-hydrochlorothiazide (PRINZIDE/ZESTORETIC) 20-12.5 MG per tablet; Take 2 tablets by mouth daily  Dispense: 180  "tablet; Refill: 1  - OFFICE/OUTPT VISIT,EST,LEVL III    4. Morbid obesity (H)  Discussed diet, exercise, goals, and types of food  - OFFICE/OUTPT VISIT,EST,LEVL III    5. Adenomatous polyp of colon, unspecified part of colon  Due for recheck, will get procedure done due to polyps    - GASTROENTEROLOGY ADULT REF PROCEDURE ONLY         7. Screening for diabetic peripheral neuropathy  Normal exam  - FOOT EXAM  NO CHARGE [83362.114]    8. Need for prophylactic vaccination and inoculation against influenza  agrees to shot  - FLU VACCINE, (RIV4) RECOMBINANT HA  , IM (FluBlok, egg free) [39829]- >18 YRS (FMG recommended  50-64 YRS)  - Vaccine Administration, Initial [52071]    COUNSELING:   Reviewed preventive health counseling, as reflected in patient instructions       Regular exercise       Healthy diet/nutrition    BP Readings from Last 1 Encounters:   10/01/18 144/90     Estimated body mass index is 36.92 kg/(m^2) as calculated from the following:    Height as of 2/5/18: 5' 1.25\" (1.556 m).    Weight as of this encounter: 197 lb (89.4 kg).      Weight management plan: Discussed healthy diet and exercise guidelines and patient will follow up in 3 months in clinic to re-evaluate.     reports that she quit smoking about 8 months ago. Her smoking use included Cigarettes. She has never used smokeless tobacco.      Counseling Resources:  ATP IV Guidelines  Pooled Cohorts Equation Calculator  Breast Cancer Risk Calculator  FRAX Risk Assessment  ICSI Preventive Guidelines  Dietary Guidelines for Americans, 2010  USDA's MyPlate  ASA Prophylaxis  Lung CA Screening    Tata Belle MD  [facility]    "

## 2018-10-01 NOTE — PROGRESS NOTES
Prior to injection verified patient identity using patient's name and date of birth.    Due to injection administration, patient instructed to remain in clinic for 15 minutes  afterwards, and to report any adverse reaction to me immediately.    Injectable Influenza Immunization Documentation    1.  Is the person to be vaccinated sick today?   No    2. Does the person to be vaccinated have an allergy to a component   of the vaccine?   No  Egg Allergy Algorithm Link    3. Has the person to be vaccinated ever had a serious reaction   to influenza vaccine in the past?   No    4. Has the person to be vaccinated ever had Guillain-Barré syndrome?   No    Form completed by patient

## 2018-10-02 LAB
ALBUMIN SERPL-MCNC: 3.7 G/DL (ref 3.4–5)
ALP SERPL-CCNC: 117 U/L (ref 40–150)
ALT SERPL W P-5'-P-CCNC: 34 U/L (ref 0–50)
ANION GAP SERPL CALCULATED.3IONS-SCNC: 9 MMOL/L (ref 3–14)
AST SERPL W P-5'-P-CCNC: 22 U/L (ref 0–45)
BILIRUB SERPL-MCNC: 0.3 MG/DL (ref 0.2–1.3)
BUN SERPL-MCNC: 11 MG/DL (ref 7–30)
CALCIUM SERPL-MCNC: 8.8 MG/DL (ref 8.5–10.1)
CHLORIDE SERPL-SCNC: 108 MMOL/L (ref 94–109)
CHOLEST SERPL-MCNC: 191 MG/DL
CO2 SERPL-SCNC: 23 MMOL/L (ref 20–32)
CREAT SERPL-MCNC: 0.48 MG/DL (ref 0.52–1.04)
CREAT UR-MCNC: 190 MG/DL
DEPRECATED CALCIDIOL+CALCIFEROL SERPL-MC: 8 UG/L (ref 20–75)
GFR SERPL CREATININE-BSD FRML MDRD: >90 ML/MIN/1.7M2
GLUCOSE SERPL-MCNC: 130 MG/DL (ref 70–99)
HDLC SERPL-MCNC: 58 MG/DL
LDLC SERPL CALC-MCNC: 111 MG/DL
MICROALBUMIN UR-MCNC: 710 MG/L
MICROALBUMIN/CREAT UR: 373.68 MG/G CR (ref 0–25)
NONHDLC SERPL-MCNC: 133 MG/DL
POTASSIUM SERPL-SCNC: 3.8 MMOL/L (ref 3.4–5.3)
PROT SERPL-MCNC: 7.5 G/DL (ref 6.8–8.8)
SODIUM SERPL-SCNC: 140 MMOL/L (ref 133–144)
TRIGL SERPL-MCNC: 111 MG/DL
TSH SERPL DL<=0.005 MIU/L-ACNC: 2.51 MU/L (ref 0.4–4)

## 2019-01-05 ENCOUNTER — ANCILLARY PROCEDURE (OUTPATIENT)
Dept: MAMMOGRAPHY | Facility: CLINIC | Age: 54
End: 2019-01-05
Attending: FAMILY MEDICINE
Payer: COMMERCIAL

## 2019-01-05 DIAGNOSIS — Z00.00 ROUTINE GENERAL MEDICAL EXAMINATION AT A HEALTH CARE FACILITY: ICD-10-CM

## 2019-01-05 DIAGNOSIS — Z12.31 VISIT FOR SCREENING MAMMOGRAM: ICD-10-CM

## 2019-01-05 PROCEDURE — 77067 SCR MAMMO BI INCL CAD: CPT | Mod: TC

## 2019-04-01 ENCOUNTER — TELEPHONE (OUTPATIENT)
Dept: FAMILY MEDICINE | Facility: CLINIC | Age: 54
End: 2019-04-01

## 2019-04-01 NOTE — TELEPHONE ENCOUNTER
Panel Management Review      Patient has the following on her problem list:     Diabetes    ASA: Passed    Last A1C  Lab Results   Component Value Date    A1C 7.7 10/01/2018    A1C 7.0 02/05/2018    A1C 6.6 08/28/2017    A1C 6.6 12/19/2014    A1C 6.1 10/10/2013     A1C tested: Passed    Last LDL:    Lab Results   Component Value Date    CHOL 191 10/01/2018     Lab Results   Component Value Date    HDL 58 10/01/2018     Lab Results   Component Value Date     10/01/2018     Lab Results   Component Value Date    TRIG 111 10/01/2018     Lab Results   Component Value Date    CHOLHDLRATIO 3.6 10/09/2013     Lab Results   Component Value Date    NHDL 133 10/01/2018       Is the patient on a Statin? YES             Is the patient on Aspirin? YES    Medications     HMG CoA Reductase Inhibitors     atorvastatin (LIPITOR) 10 MG tablet       Salicylates     aspirin (ASA) 81 MG EC tablet        aspirin 81 MG tablet             Last three blood pressure readings:  BP Readings from Last 3 Encounters:   10/01/18 136/88   03/26/18 (!) 162/98   02/05/18 156/80       Date of last diabetes office visit: 10/01/2018     Tobacco History:     History   Smoking Status     Former Smoker     Types: Cigarettes     Quit date: 1/22/2018   Smokeless Tobacco     Never Used     Comment: 4-5 per day           Composite cancer screening  Chart review shows that this patient is due/due soon for the following Colonoscopy  Summary:    Patient is due/failing the following:   A1C, COLONOSCOPY and FOLLOW UP    Action needed:   Patient needs office visit for Diabetes follow up, A1c. Colonoscopy.    Type of outreach:    Phone, left message for patient to call back.     Questions for provider review:    None                                                                                                                                    Yola Hall MA       Chart routed to Care Team .

## 2019-04-06 ENCOUNTER — OFFICE VISIT (OUTPATIENT)
Dept: FAMILY MEDICINE | Facility: CLINIC | Age: 54
End: 2019-04-06
Payer: COMMERCIAL

## 2019-04-06 VITALS
BODY MASS INDEX: 37.52 KG/M2 | SYSTOLIC BLOOD PRESSURE: 125 MMHG | DIASTOLIC BLOOD PRESSURE: 77 MMHG | TEMPERATURE: 98.1 F | RESPIRATION RATE: 16 BRPM | HEART RATE: 96 BPM | OXYGEN SATURATION: 96 % | WEIGHT: 200.2 LBS

## 2019-04-06 DIAGNOSIS — E55.9 VITAMIN D DEFICIENCY: ICD-10-CM

## 2019-04-06 DIAGNOSIS — H61.22 IMPACTED CERUMEN OF LEFT EAR: ICD-10-CM

## 2019-04-06 DIAGNOSIS — E66.01 MORBID OBESITY (H): ICD-10-CM

## 2019-04-06 DIAGNOSIS — Z11.4 SCREENING FOR HIV (HUMAN IMMUNODEFICIENCY VIRUS): ICD-10-CM

## 2019-04-06 DIAGNOSIS — Z12.11 SCREEN FOR COLON CANCER: ICD-10-CM

## 2019-04-06 DIAGNOSIS — E11.9 TYPE 2 DIABETES MELLITUS WITHOUT COMPLICATION, WITHOUT LONG-TERM CURRENT USE OF INSULIN (H): Primary | ICD-10-CM

## 2019-04-06 DIAGNOSIS — I10 ESSENTIAL HYPERTENSION: ICD-10-CM

## 2019-04-06 DIAGNOSIS — E10.65 UNCONTROLLED TYPE 1 DIABETES MELLITUS WITH HYPERGLYCEMIA (H): ICD-10-CM

## 2019-04-06 LAB
ALBUMIN SERPL-MCNC: 3.8 G/DL (ref 3.4–5)
ALP SERPL-CCNC: 109 U/L (ref 40–150)
ALT SERPL W P-5'-P-CCNC: 26 U/L (ref 0–50)
ANION GAP SERPL CALCULATED.3IONS-SCNC: 6 MMOL/L (ref 3–14)
AST SERPL W P-5'-P-CCNC: 17 U/L (ref 0–45)
BILIRUB SERPL-MCNC: 0.2 MG/DL (ref 0.2–1.3)
BUN SERPL-MCNC: 17 MG/DL (ref 7–30)
CALCIUM SERPL-MCNC: 9.1 MG/DL (ref 8.5–10.1)
CHLORIDE SERPL-SCNC: 105 MMOL/L (ref 94–109)
CHOLEST SERPL-MCNC: 165 MG/DL
CO2 SERPL-SCNC: 27 MMOL/L (ref 20–32)
CREAT SERPL-MCNC: 0.69 MG/DL (ref 0.52–1.04)
CREAT UR-MCNC: 171 MG/DL
GFR SERPL CREATININE-BSD FRML MDRD: >90 ML/MIN/{1.73_M2}
GLUCOSE SERPL-MCNC: 217 MG/DL (ref 70–99)
HBA1C MFR BLD: 9.6 % (ref 0–5.6)
HDLC SERPL-MCNC: 56 MG/DL
LDLC SERPL CALC-MCNC: 88 MG/DL
MICROALBUMIN UR-MCNC: 142 MG/L
MICROALBUMIN/CREAT UR: 83.04 MG/G CR (ref 0–25)
NONHDLC SERPL-MCNC: 109 MG/DL
POTASSIUM SERPL-SCNC: 3.9 MMOL/L (ref 3.4–5.3)
PROT SERPL-MCNC: 7.6 G/DL (ref 6.8–8.8)
SODIUM SERPL-SCNC: 138 MMOL/L (ref 133–144)
TRIGL SERPL-MCNC: 106 MG/DL
TSH SERPL DL<=0.005 MIU/L-ACNC: 1.2 MU/L (ref 0.4–4)

## 2019-04-06 PROCEDURE — 99214 OFFICE O/P EST MOD 30 MIN: CPT | Mod: 25 | Performed by: FAMILY MEDICINE

## 2019-04-06 PROCEDURE — 36415 COLL VENOUS BLD VENIPUNCTURE: CPT | Performed by: FAMILY MEDICINE

## 2019-04-06 PROCEDURE — 80053 COMPREHEN METABOLIC PANEL: CPT | Performed by: FAMILY MEDICINE

## 2019-04-06 PROCEDURE — 80061 LIPID PANEL: CPT | Performed by: FAMILY MEDICINE

## 2019-04-06 PROCEDURE — 82043 UR ALBUMIN QUANTITATIVE: CPT | Performed by: FAMILY MEDICINE

## 2019-04-06 PROCEDURE — 82306 VITAMIN D 25 HYDROXY: CPT | Performed by: FAMILY MEDICINE

## 2019-04-06 PROCEDURE — 69210 REMOVE IMPACTED EAR WAX UNI: CPT | Performed by: FAMILY MEDICINE

## 2019-04-06 PROCEDURE — 84443 ASSAY THYROID STIM HORMONE: CPT | Performed by: FAMILY MEDICINE

## 2019-04-06 PROCEDURE — 83036 HEMOGLOBIN GLYCOSYLATED A1C: CPT | Performed by: FAMILY MEDICINE

## 2019-04-06 RX ORDER — LISINOPRIL AND HYDROCHLOROTHIAZIDE 12.5; 2 MG/1; MG/1
2 TABLET ORAL DAILY
Qty: 180 TABLET | Refills: 1 | Status: SHIPPED | OUTPATIENT
Start: 2019-04-06 | End: 2019-09-30

## 2019-04-06 RX ORDER — ATORVASTATIN CALCIUM 20 MG/1
20 TABLET, FILM COATED ORAL DAILY
Qty: 90 TABLET | Refills: 3 | Status: SHIPPED | OUTPATIENT
Start: 2019-04-06 | End: 2019-12-04

## 2019-04-06 RX ORDER — METFORMIN HCL 500 MG
1000 TABLET, EXTENDED RELEASE 24 HR ORAL 2 TIMES DAILY WITH MEALS
Qty: 360 TABLET | Refills: 1 | Status: SHIPPED | OUTPATIENT
Start: 2019-04-06 | End: 2019-10-29

## 2019-04-06 RX ORDER — GLIPIZIDE 10 MG/1
10 TABLET, FILM COATED, EXTENDED RELEASE ORAL DAILY
Qty: 30 TABLET | Refills: 1 | Status: SHIPPED | OUTPATIENT
Start: 2019-04-06 | End: 2019-05-07

## 2019-04-06 NOTE — NURSING NOTE
"Chief Complaint   Patient presents with     Blood Draw     LABS.  Patient is fasting     Refill Request     Diabetes     Initial /77 (BP Location: Right arm, Patient Position: Sitting, Cuff Size: Adult Regular)   Pulse 96   Temp 98.1  F (36.7  C) (Oral)   Resp 16   Wt 90.8 kg (200 lb 3.2 oz)   SpO2 96%   BMI 37.52 kg/m   Estimated body mass index is 37.52 kg/m  as calculated from the following:    Height as of 2/5/18: 1.556 m (5' 1.25\").    Weight as of this encounter: 90.8 kg (200 lb 3.2 oz).  BP completed using cuff size regular right arm    Lisa Magill, CMA    "

## 2019-04-06 NOTE — PROGRESS NOTES
SUBJECTIVE:   Vikki Medina is a 53 year old female who presents to clinic today for the following health issues:  History of Present Illness     Diabetes:     Frequency of checking blood sugars::  Not at all    Diabetic concerns::  Other    Hypoglycemia symptoms::  None    Paraesthesia present::  No    Eye Exam in the last year::  Yes    2018    Diabetes Management Resources    Diabetes Follow-up    Paresthesias (numbness or burning in feet) or sores: No    BP Readings from Last 2 Encounters:   04/06/19 125/77   10/01/18 136/88     Hemoglobin A1C (%)   Date Value   10/01/2018 7.7 (H)   02/05/2018 7.0 (H)     LDL Cholesterol Calculated (mg/dL)   Date Value   10/01/2018 111 (H)   02/05/2018 89       Diabetes Management Resources  Problem list and histories reviewed & adjusted, as indicated.  Additional history: as documented    Patient presents for a recheck of hyperlipidemia.  The patient is currently following a low fat diet plan and is not following a regular exercise plan. On lipitor 10mg    25 OH Vit D2   Date Value Ref Range Status   02/16/2011 11 ug/L Final   12/02/2010 12 ug/L Final   05/28/2010 <5 ug/L Final     25 OH Vit D3   Date Value Ref Range Status   02/16/2011 7 ug/L Final   12/02/2010 5 ug/L Final   05/28/2010 9 ug/L Final     25 OH Vit D total   Date Value Ref Range Status   02/16/2011 18 (L) 30 - 75 ug/L Final     Comment:     Season, race, dietary intake, and treatment affect the concentration of   25-hydroxy-Vitamin D. Values may decrease during winter months and increase   during summer months. Values less than 30 ug/L may indicate Vitamin D   deficiency.   12/02/2010 17 (L) 30 - 75 ug/L Final     Comment:     Season, race, dietary intake, and treatment affect the concentration of   25-hydroxy-Vitamin D. Values may decrease during winter months and increase   during summer months. Values less than 30 ug/L may indicate Vitamin D   deficiency.   05/28/2010 (L) 30 - 75 ug/L Final     <14  Season, race, dietary intake, and treatment affect the concentration of   25-hydroxy-Vitamin D. Values may decrease during winter months and increase   during summer months. Values less than 30 ug/L may indicate Vitamin D   deficiency.     Has not been taking vit D at all.    Patient presents for evaluation of hypertension.  She indicates that she is feeling well and denies any symptoms referable to her elevated blood pressure. Specifically denies chest pain, palpitations, dyspnea, orthopnea, PND or peripheral edema. Current medication regimen is as listed below. Patient denies any side effects of medication.  Taking lisinopril/hctz      Left ear pain, and hx of whole in the ear drum. Very itchy, using qtip daily, wanting to see ENT?        Recent Labs   Lab Test 10/01/18  0955 02/05/18  1121 08/28/17  1532 08/28/17  1423  10/09/13  0816   A1C 7.7* 7.0*  --  6.6*   < >  --    * 89  --   --   --  111   HDL 58 61  --   --   --  52   TRIG 111 165*  --   --   --  115   ALT 34 20 37  --   --  50   CR 0.48* 0.78 0.53  --   --  0.47*   GFRESTIMATED >90 78 >90  --   --  >90   GFRESTBLACK >90 >90 >90  --   --  >90   POTASSIUM 3.8 3.6 4.0  --   --  3.6   TSH 2.51  --  0.64  --   --  2.20    < > = values in this interval not displayed.      BP Readings from Last 3 Encounters:   04/06/19 125/77   10/01/18 136/88   03/26/18 (!) 162/98    Wt Readings from Last 3 Encounters:   04/06/19 90.8 kg (200 lb 3.2 oz)   10/01/18 89.4 kg (197 lb)   03/26/18 89.8 kg (198 lb)                  Labs reviewed in EPIC    ROS:  Constitutional, HEENT, cardiovascular, pulmonary, gi and gu systems are negative, except as otherwise noted.    OBJECTIVE:     /77 (BP Location: Right arm, Patient Position: Sitting, Cuff Size: Adult Regular)   Pulse 96   Temp 98.1  F (36.7  C) (Oral)   Resp 16   Wt 90.8 kg (200 lb 3.2 oz)   SpO2 96%   BMI 37.52 kg/m    Body mass index is 37.52 kg/m .  GENERAL: healthy, alert and no distress  EYES:  Eyes grossly normal to inspection,  and conjunctivae and sclerae normal  HENT: ear canals and TM abnormal on the right, irregular, no land marks, unable to see on the left due to ear wax, nose and mouth without ulcers or lesions  NECK: no adenopathy, no asymmetry, masses, or scars and thyroid normal to palpation  RESP: lungs clear to auscultation - no rales, rhonchi or wheezes  CV: regular rate and rhythm, normal S1 S2, no S3 or S4, no murmur, click or rub, no peripheral edema and peripheral pulses strong  MS: no gross musculoskeletal defects noted, no edema  SKIN: no suspicious lesions or rashes  NEURO: Normal strength and tone, mentation intact and speech normal  PSYCH: mentation appears normal, affect normal/bright  Diabetic foot exam: normal DP and PT pulses, no trophic changes or ulcerative lesions, normal sensory exam and normal monofilament exam    Diagnostic Test Results:  none     ASSESSMENT/PLAN:             1. Type 2 diabetes mellitus without complication, without long-term current use of insulin (H)  Controlled, due for eye appt, will raise lipitor to 20mg, cont every thing else the same  - atorvastatin (LIPITOR) 20 MG tablet; Take 1 tablet (20 mg) by mouth daily  Dispense: 90 tablet; Refill: 3  - metFORMIN (GLUCOPHAGE-XR) 500 MG 24 hr tablet; Take 2 tablets (1,000 mg) by mouth 2 times daily (with meals)  Dispense: 360 tablet; Refill: 1  - aspirin (ASA) 81 MG EC tablet; TAKE 1 TABLET (81 MG) BY MOUTH DAILY  Dispense: 90 tablet; Refill: 3  - HEMOGLOBIN A1C  - Albumin Random Urine Quantitative with Creat Ratio  - Lipid panel reflex to direct LDL Fasting  - TSH with free T4 reflex  - Comprehensive metabolic panel    2. Morbid obesity (H)  Pt wants to work on weight, but not wanting referrals or medications    3. Vitamin D deficiency    - vitamin D3 (CHOLECALCIFEROL) 05474 units capsule; Take 1 capsule (50,000 Units) by mouth twice a week  Dispense: 8 capsule; Refill: 1  - Vitamin D Deficiency    4.  Essential hypertension  Well controlled  - lisinopril-hydrochlorothiazide (PRINZIDE/ZESTORETIC) 20-12.5 MG tablet; Take 2 tablets by mouth daily  Dispense: 180 tablet; Refill: 1    5. Screen for colon cancer  Pt not sure which she will do, will order both, so she has the option  - Fecal colorectal cancer screen FIT - Future (S+30); Future  - GASTROENTEROLOGY ADULT REF PROCEDURE ONLY [facility] [phone]; Dr. Joseph    6. Screening for HIV (human immunodeficiency virus)  Pt declined    7. Impacted cerumen of left ear  Will referral due to TM abnormal on the right and left hx of perforation   - REMOVE IMPACTED CERUMEN  - OTOLARYNGOLOGY REFERRAL    Work on weight loss  Regular exercise    Tata Belle MD  [facility]

## 2019-04-08 LAB — DEPRECATED CALCIDIOL+CALCIFEROL SERPL-MC: 9 UG/L (ref 20–75)

## 2019-05-03 DIAGNOSIS — E55.9 VITAMIN D DEFICIENCY: ICD-10-CM

## 2019-05-03 NOTE — TELEPHONE ENCOUNTER
Component      Latest Ref Rng & Units 10/1/2018 4/6/2019   Vitamin D Deficiency screening      20 - 75 ug/L 8 (L) 9 (L)     Routing refill request to provider for review/approval because:  Drug not on the G refill protocol   Taylor Zapata RN

## 2019-05-03 NOTE — TELEPHONE ENCOUNTER
"Requested Prescriptions   Pending Prescriptions Disp Refills     cholecalciferol (VITAMIN D3) 99792 units (1250 mcg) capsule [Pharmacy Med Name: VITAMIN D3 50,000 UNIT CAPSULE] 8 capsule 0     Sig: TAKE 1 CAPSULE (50,000 UNITS) BY MOUTH TWICE A WEEK   Last Written Prescription Date:  4/11/19  Last Fill Quantity: 8 caps ,  # refills: 1   Last Office Visit: 4/6/2019 Yoshi      Return in about 6 months (around 10/6/2019) for diabetes check.     Future Office Visit:         Vitamin Supplements (Adult) Protocol Failed - 5/3/2019 11:35 AM        Failed - High dose Vitamin D not ordered        Passed - Recent (12 mo) or future (30 days) visit within the authorizing provider's specialty     Patient had office visit in the last 12 months or has a visit in the next 30 days with authorizing provider or within the authorizing provider's specialty.  See \"Patient Info\" tab in inbasket, or \"Choose Columns\" in Meds & Orders section of the refill encounter.              Passed - Medication is active on med list        "

## 2019-05-07 DIAGNOSIS — E11.9 TYPE 2 DIABETES MELLITUS WITHOUT COMPLICATION, WITHOUT LONG-TERM CURRENT USE OF INSULIN (H): ICD-10-CM

## 2019-05-07 RX ORDER — CHOLECALCIFEROL (VITAMIN D3) 1250 MCG
CAPSULE ORAL
Qty: 8 CAPSULE | Refills: 0 | Status: SHIPPED | OUTPATIENT
Start: 2019-05-07 | End: 2019-06-09

## 2019-05-07 RX ORDER — GLIPIZIDE 10 MG/1
TABLET, FILM COATED, EXTENDED RELEASE ORAL
Qty: 30 TABLET | Refills: 1 | Status: SHIPPED | OUTPATIENT
Start: 2019-05-07 | End: 2019-08-01

## 2019-05-07 NOTE — TELEPHONE ENCOUNTER
Prescription approved per [facility] Refill Protocol.  Taylor Zapata RN

## 2019-05-07 NOTE — TELEPHONE ENCOUNTER
"Requested Prescriptions   Pending Prescriptions Disp Refills     glipiZIDE (GLUCOTROL XL) 10 MG 24 hr tablet [Pharmacy Med Name: GLIPIZIDE ER 10 MG TABLET] 30 tablet 0     Sig: TAKE 1 TABLET BY MOUTH EVERY DAY   Last Written Prescription Date:  4/6/19  Last Fill Quantity: 30,  # refills: 1   Last Office Visit: 4/6/2019 Yoshi      Return in about 6 months (around 10/6/2019) for diabetes check.     Future Office Visit:         Sulfonylurea Agents Passed - 5/7/2019 11:53 AM        Passed - Blood pressure less than 140/90 in past 6 months     BP Readings from Last 3 Encounters:   04/06/19 125/77   10/01/18 136/88   03/26/18 (!) 162/98                 Passed - Patient has documented LDL within the past 12 mos.     Recent Labs   Lab Test 04/06/19  0946   LDL 88             Passed - Patient has had a Microalbumin in the past 12 mos.     Recent Labs   Lab Test 04/06/19  0946   MICROL 142   UMALCR 83.04*             Passed - Patient has documented A1c within the specified period of time.     If HgbA1C is 8 or greater, it needs to be on file within the past 3 months.  If less than 8, must be on file within the past 6 months.     Recent Labs   Lab Test 04/06/19  0946   A1C 9.6*             Passed - Medication is active on med list        Passed - Patient is age 18 or older        Passed - No active pregnancy on record        Passed - Patient has a recent creatinine (normal) within the past 12 mos.     Recent Labs   Lab Test 04/06/19  0946   CR 0.69             Passed - Patient has not had a positive pregnancy test within the past 12 mos.        Passed - Recent (6 mo) or future (30 days) visit within the authorizing provider's specialty     Patient had office visit in the last 6 months or has a visit in the next 30 days with authorizing provider or within the authorizing provider's specialty.  See \"Patient Info\" tab in inbasket, or \"Choose Columns\" in Meds & Orders section of the refill encounter.            "

## 2019-06-03 ENCOUNTER — HOSPITAL ENCOUNTER (OUTPATIENT)
Facility: CLINIC | Age: 54
Discharge: HOME | End: 2019-06-03
Attending: INTERNAL MEDICINE | Admitting: INTERNAL MEDICINE
Payer: COMMERCIAL

## 2019-06-03 VITALS
HEART RATE: 81 BPM | RESPIRATION RATE: 12 BRPM | DIASTOLIC BLOOD PRESSURE: 61 MMHG | SYSTOLIC BLOOD PRESSURE: 98 MMHG | OXYGEN SATURATION: 92 %

## 2019-06-03 LAB
COLONOSCOPY: NORMAL
GLUCOSE BLDC GLUCOMTR-MCNC: 183 MG/DL (ref 70–99)

## 2019-06-03 PROCEDURE — 82962 GLUCOSE BLOOD TEST: CPT

## 2019-06-03 PROCEDURE — G0500 MOD SEDAT ENDO SERVICE >5YRS: HCPCS | Performed by: INTERNAL MEDICINE

## 2019-06-03 PROCEDURE — G0105 COLORECTAL SCRN; HI RISK IND: HCPCS | Performed by: INTERNAL MEDICINE

## 2019-06-03 PROCEDURE — 25000128 H RX IP 250 OP 636: Performed by: INTERNAL MEDICINE

## 2019-06-03 PROCEDURE — 45378 DIAGNOSTIC COLONOSCOPY: CPT | Performed by: INTERNAL MEDICINE

## 2019-06-03 RX ORDER — LIDOCAINE 40 MG/G
CREAM TOPICAL
Status: DISCONTINUED | OUTPATIENT
Start: 2019-06-03 | End: 2019-06-03 | Stop reason: HOSPADM

## 2019-06-03 RX ORDER — FENTANYL CITRATE 50 UG/ML
INJECTION, SOLUTION INTRAMUSCULAR; INTRAVENOUS PRN
Status: DISCONTINUED | OUTPATIENT
Start: 2019-06-03 | End: 2019-06-03 | Stop reason: HOSPADM

## 2019-06-03 RX ORDER — NALOXONE HYDROCHLORIDE 0.4 MG/ML
.1-.4 INJECTION, SOLUTION INTRAMUSCULAR; INTRAVENOUS; SUBCUTANEOUS
Status: DISCONTINUED | OUTPATIENT
Start: 2019-06-03 | End: 2019-06-03 | Stop reason: HOSPADM

## 2019-06-03 RX ORDER — ONDANSETRON 2 MG/ML
4 INJECTION INTRAMUSCULAR; INTRAVENOUS EVERY 6 HOURS PRN
Status: DISCONTINUED | OUTPATIENT
Start: 2019-06-03 | End: 2019-06-03 | Stop reason: HOSPADM

## 2019-06-03 RX ORDER — ONDANSETRON 2 MG/ML
4 INJECTION INTRAMUSCULAR; INTRAVENOUS
Status: DISCONTINUED | OUTPATIENT
Start: 2019-06-03 | End: 2019-06-03 | Stop reason: HOSPADM

## 2019-06-03 RX ORDER — FLUMAZENIL 0.1 MG/ML
0.2 INJECTION, SOLUTION INTRAVENOUS
Status: DISCONTINUED | OUTPATIENT
Start: 2019-06-03 | End: 2019-06-03 | Stop reason: HOSPADM

## 2019-06-03 RX ORDER — ONDANSETRON 4 MG/1
4 TABLET, ORALLY DISINTEGRATING ORAL EVERY 6 HOURS PRN
Status: DISCONTINUED | OUTPATIENT
Start: 2019-06-03 | End: 2019-06-03 | Stop reason: HOSPADM

## 2019-06-03 NOTE — H&P
Pre-Endoscopy History and Physical     Vikki Medina MRN# [id number]   YOB: 1965 Age: 53 year old     Date of Procedure: 6/3/2019  Primary care provider: Tata Belle  Type of Endoscopy: Colonoscopy with possible biopsy, possible polypectomy  Reason for Procedure: screen  Type of Anesthesia Anticipated: Conscious Sedation    HPI:    Vikki is a 53 year old female who will be undergoing the above procedure.      A history and physical has been performed. The patient's medications and allergies have been reviewed. The risks and benefits of the procedure and the sedation options and risks were discussed with the patient.  All questions were answered and informed consent was obtained.      She denies a personal or family history of anesthesia complications or bleeding disorders.     Patient Active Problem List   Diagnosis     Vitamin D deficiency     CARDIOVASCULAR SCREENING; LDL GOAL LESS THAN 160     S/P colonoscopy with polypectomy     Adenomatous colon polyp     Type 2 diabetes mellitus, controlled (H)     Morbid obesity (H)     Hyperlipidemia with target LDL less than 100        Past Medical History:   Diagnosis Date     Delivery normal     , no complications        Past Surgical History:   Procedure Laterality Date     left arm benign tumor removed       TUBAL LIGATION         Social History     Tobacco Use     Smoking status: Former Smoker     Types: Cigarettes     Last attempt to quit: 2018     Years since quittin.3     Smokeless tobacco: Never Used     Tobacco comment: 4-5 per day   Substance Use Topics     Alcohol use: No     Alcohol/week: 0.0 oz       Family History   Problem Relation Age of Onset     Hypertension Father      Family History Negative Mother        Prior to Admission medications    Medication Sig Start Date End Date Taking? Authorizing Provider   aspirin (ASA) 81 MG EC tablet TAKE 1 TABLET (81 MG) BY MOUTH DAILY 19  Yes Yoshi  "Tata Crook MD   atorvastatin (LIPITOR) 20 MG tablet Take 1 tablet (20 mg) by mouth daily 4/6/19  Yes Tata Belle MD   cholecalciferol (VITAMIN D3) 82261 units (1250 mcg) capsule TAKE 1 CAPSULE (50,000 UNITS) BY MOUTH TWICE A WEEK 5/7/19  Yes Tata Belle MD   glipiZIDE (GLUCOTROL XL) 10 MG 24 hr tablet TAKE 1 TABLET BY MOUTH EVERY DAY 5/7/19  Yes Tata Belle MD   lisinopril-hydrochlorothiazide (PRINZIDE/ZESTORETIC) 20-12.5 MG tablet Take 2 tablets by mouth daily 4/6/19  Yes Tata Belle MD   metFORMIN (GLUCOPHAGE-XR) 500 MG 24 hr tablet Take 2 tablets (1,000 mg) by mouth 2 times daily (with meals) 4/6/19  Yes Tata Belle MD   vitamin D3 (CHOLECALCIFEROL) 15045 units capsule Take 1 capsule (50,000 Units) by mouth twice a week 4/11/19  Yes Tata Belle MD       Allergies   Allergen Reactions     Sulfa Drugs         REVIEW OF SYSTEMS:   5 point ROS negative except as noted above in HPI, including Gen., Resp., CV, GI &  system review.    PHYSICAL EXAM:   There were no vitals taken for this visit. Estimated body mass index is 37.52 kg/m  as calculated from the following:    Height as of 2/5/18: 1.556 m (5' 1.25\").    Weight as of 4/6/19: 90.8 kg (200 lb 3.2 oz).   GENERAL APPEARANCE: alert, and oriented  MENTAL STATUS: alert  AIRWAY EXAM: Mallampatti Class I (visualization of the soft palate, fauces, uvula, anterior and posterior pillars)  RESP: lungs clear to auscultation - no rales, rhonchi or wheezes  CV: regular rates and rhythm  DIAGNOSTICS:    Not indicated    IMPRESSION   ASA Class 1 - Healthy patient, no medical problems    PLAN:   Plan for Colonoscopy with possible biopsy, possible polypectomy. We discussed the risks, benefits and alternatives and the patient wished to proceed.    The above has been forwarded to the consulting provider.      Signed Electronically by: Javi Joseph  Meron 3, 2019          "

## 2019-06-03 NOTE — LETTER
May 10, 2019      Vikki Mai Adam  [address]  [place] [address]        Thank you for choosing [facility]. You are scheduled for the following service:     Date:  Monday Meron 3, 2019             Procedure:  COLONOSCOPY  Doctor:        Dr Joseph   Arrival Time:  130pm  *Check in at Emergency/Endoscopy desk*  Procedure Time:  2pm      Location:   [facility]        Endoscopy Department, First Floor (Enter through ER Doors) *        [address] [place], [place] [address]      [phone] or [phone] ([facility]) to reschedule      MIRALAX -GATORADE  PREP  Colonoscopy is the most accurate test to detect colon polyps and colon cancer; and the only test where polyps can be removed. During this procedure, a doctor examines the lining of your large intestine and rectum through a flexible tube.   Transportation  Arrange for a ride for the day of your procedure with a responsible adult.  A taxi ride is not an option unless you are accompanied by a responsible adult. If you fail to arrange transportation with a responsible adult, your procedure will be cancelled and rescheduled.    Purchase the  following supplies at your local pharmacy:  - 2 (two) bisacodyl tablets: each tablet contains 5 mg.  (Dulcolax  laxative NOT Dulcolax  stool softener)   - 1 (one) 8.3 oz bottle of Polyethylene Glycol (PEG) 3350 Powder   (MiraLAX , Smooth LAX , ClearLAX  or equivalent)  - 64 oz Gatorade    Regular Gatorade, Gatorade G2 , Powerade , Powerade Zero  or Pedialyte  is acceptable. Red colored flavors are not allowed; all other colors (yellow, green, orange, purple and blue) are okay. It is also okay to buy two 2.12 oz packets of powdered Gatorade that can be mixed with water to a total volume of 64 oz of liquid.  - 1 (one) 10 oz bottle of Magnesium Citrate (Red colored flavors are not allowed)  It is also okay for you to use a 0.5 oz package of powdered magnesium  citrate (17 g) mixed with 10 oz of water.      PREPARATION FOR COLONOSCOPY    7 days before:    Discontinue fiber supplements and medications containing iron. This includes Metamucil  and Fibercon ; and multivitamins with iron.    3 days before:    Begin a low-fiber diet. A low-fiber diet helps making the cleanout more effective.     Examples of a low-fiber diet include (but are not limited to): white bread, white rice, pasta, crackers, fish, chicken, eggs, ground beef, creamy peanut butter, cooked/steamed/boiled vegetables, canned fruit, bananas, melons, milk, plain yogurt cheese, salad dressing and other condiments.     The following are not allowed on a low-fiber diet: seeds, nuts, popcorn, bran, whole wheat, corn, quinoa, raw fruits and vegetables, berries and dried fruit, beans and lentils.    For additional details on low-fiber diet, please refer to the table on the last page.    2 days before:    Continue the low-fiber diet.     Drink at least 8 glasses of water throughout the day.     Stop eating solid foods at 11:45 pm.    1 day before:    In the morning: begin a clear liquid diet (liquids you can see through).     Examples of a clear liquid diet include: water, clear broth or bouillon, Gatorade, Pedialyte or Powerade, carbonated and non-carbonated soft drinks (Sprite , 7-Up , ginger ale), strained fruit juices without pulp (apple, white grape, white cranberry), Jell-O  and popsicles.     The following are not allowed on a clear liquid diet: red liquids, alcoholic beverages, dairy products (milk, creamer, and yogurt), protein shakes, creamy broths, juice with pulp and chewing tobacco.    At noon: take 2 (two) bisacodyl tablets     At 4 (and no later than 6pm): start drinking the Miralax-Gatorade preparation (8.3 oz of Miralax mixed with 64 oz of Gatorade in a large pitcher). Drink 1(one) 8 oz glass every 15 minutes thereafter, until the mixture is gone.    COLON CLEANSING TIPS: drink adequate amounts of  fluids before and after your colon cleansing to prevent dehydration. Stay near a toilet because you will have diarrhea. Even if you are sitting on the toilet, continue to drink the cleansing solution every 15 minutes. If you feel nauseous or vomit, rinse your mouth with water, take a 15 to 30-minute-break and then continue drinking the solution. You will be uncomfortable until the stool has flushed from your colon (in about 2 to 4 hours). You may feel chilled.    Day of your procedure  You may take all of your morning medications including blood pressure medications, blood thinners (if you have not been instructed to stop these by our office), methadone, anti-seizure medications with sips of water 3 hours prior to your procedure or earlier. Do not take insulin or vitamins prior to your procedure. Continue the clear liquid diet.       4 hours prior: drink 10 oz of magnesium citrate. It may be easier to drink it with a straw.    STOP consuming all liquids after that.     Do not take anything by mouth during this time.     Allow extra time to travel to your procedure as you may need to stop and use a restroom along the way.    You are ready for the procedure, if you followed all instructions and your stool is no longer formed, but clear or yellow liquid. If you are unsure whether your colon is clean, please call our office at [phone] before you leave for your appointment.    Bring the following to your procedure:  - Insurance Card/Photo ID.   - List of current medications including over-the-counter medications and supplements.   - Your rescue inhaler if you currently use one to control asthma.      Canceling or rescheduling your appointment:   If you must cancel or reschedule your appointment, please call [phone] as soon as possible.      COLONOSCOPY PRE-PROCEDURE CHECKLIST    If you have diabetes, ask your regular doctor for diet and medication restrictions.  If you take an anticoagulant or anti-platelet  medication (such as Coumadin , Lovenox , Pradaxa , Xarelto , Eliquis , etc.), please call your primary doctor for advice on holding this medication.  If you take aspirin you may continue to do so.  If you are or may be pregnant, please discuss the risks and benefits of this procedure with your doctor.        What happens during a colonoscopy?    Plan to spend up to two hours, starting at registration time, at the endoscopy center the day of your procedure. The colonoscopy takes an average of 15 to 30 minutes. Recovery time is about 30 minutes.      Before the exam:    You will change into a gown.    Your medical history and medication list will be reviewed with you, unless that has been done over the phone prior to the procedure.     A nurse will insert an intravenous (IV) line into your hand or arm.    The doctor will meet with you and will give you a consent form to sign.  During the exam:     Medicine will be given through the IV line to help you relax.     Your heart rate and oxygen levels will be monitored. If your blood pressure is low, you may be given fluids through the IV line.     The doctor will insert a flexible hollow tube, called a colonoscope, into your rectum. The scope will be advanced slowly through the large intestine (colon).    You may have a feeling of fullness or pressure.     If an abnormal tissue or a polyp is found, the doctor may remove it through the endoscope for closer examination, or biopsy. Tissue removal is painless    After the exam:           Any tissue samples removed during the exam will be sent to a lab for evaluation. It may take 5-7 working days for you to be notified of the results.     A nurse will provide you with complete discharge instructions before you leave the endoscopy center. Be sure to ask the nurse for specific instructions if you take blood thinners such as Aspirin, Coumadin or Plavix.     The doctor will prepare a full report for you and for the physician who  referred you for the procedure.     Your doctor will talk with you about the initial results of your exam.      Medication given during the exam will prohibit you from driving for the rest of the day.     Following the exam, you may resume your normal diet. Your first meal should be light, no greasy foods. Avoid alcohol until the next day.     You may resume your regular activities the day after the procedure.         LOW-FIBER DIET    Foods RECOMMENDED Foods to AVOID   Breads, Cereal, Rice and Pasta:   White bread, rolls, biscuits, croissant and rock toast.   Waffles, Haitian toast and pancakes.   White rice, noodles, pasta, macaroni and peeled cooked potatoes.   Plain crackers and saltines.   Cooked cereals: farina, cream of rice.   Cold cereals: Puffed Rice , Rice Krispies , Corn Flakes  and Special K    Breads, Cereal, Rice and Pasta:   Breads or rolls with nuts, seeds or fruit.   Whole wheat, pumpernickel, rye breads and cornbread.   Potatoes with skin, brown or wild rice, and kasha (buckwheat).     Vegetables:   Tender cooked and canned vegetables without seeds: carrots, asparagus tips, green or wax beans, pumpkin, spinach, lima beans. Vegetables:   Raw or steamed vegetables.   Vegetables with seeds.   Sauerkraut.   Winter squash, peas, broccoli, Brussel sprouts, cabbage, onions, cauliflower, baked beans, peas and corn.   Fruits:   Strained fruit juice.   Canned fruit, except pineapple.   Ripe bananas and melon. Fruits:   Prunes and prune juice.   Raw fruits.   Dried fruits: figs, dates and raisins.   Milk/Dairy:   Milk: plain or flavored.   Yogurt, custard and ice cream.   Cheese and cottage cheese Milk/Dairy:     Meat and other proteins:   ground, well-cooked tender beef, lamb, ham, veal, pork, fish, poultry and organ meats.   Eggs.   Peanut butter without nuts. Meat and other proteins:   Tough, fibrous meats with gristle.   Dry beans, peas and lentils.   Peanut butter with nuts.   Tofu.   Fats, Snack,  Sweets, Condiments and Beverages:   Margarine, butter, oils, mayonnaise, sour cream and salad dressing, plain gravy.   Sugar, hard candy, clear jelly, honey and syrup.   Spices, cooked herbs, bouillon, broth and soups made with allowed vegetable, ketchup and mustard.   Coffee, tea and carbonated drinks.   Plain cakes, cookies and pretzels.   Gelatin, plain puddings, custard, ice cream, sherbet and popsicles. Fats, Snack, Sweets, Condiments and Beverages:   Nuts, seeds and coconut.   Jam, marmalade and preserves.   Pickles, olives, relish and horseradish.   All desserts containing nuts, seeds, dried fruit and coconut; or made from whole grains or bran.   Candy made with nuts or seeds.   Popcorn.                     DIRECTIONS TO THE ENDOSCOPY DEPARTMENT     From the north ([facility])  Take [address] South, exit on [address] [address]. Get into the left hand kathy, turn left (east), go one-half mile to [address] and turn left. Go north to the first stoplight, take a right on [address] and follow it to the Emergency entrance.    From the south ([facility])  Take 35N to the [address]E split and exit on [address] [address]. On [address] [address], turn left (west) to [address]. Turn right (north) on [address]. Go north to the first stoplight, take a right on [address] and follow it to the Emergency entrance.    From the east via 35E ([facility])  Take [address] south to [address] [address] exit. Turn right on [address] [address]. Go west to [address]. Turn right (north) on [address]. Go to the first stoplight, take a right and follow on [address] to the Emergency entrance.    From the east via [address] ([facility])  Take [address] to [address]. Turn left (south) on [address] to [address]. Turn left (east) on [address] and follow it to the Emergency entrance.    From the west via [address] ([place])  Take [address] east to [address]. Turn right (south) on [address] to [facility]. Turn left (east) on [facility] and follow it to the Emergency entrance.

## 2019-06-11 DIAGNOSIS — E11.9 TYPE 2 DIABETES MELLITUS WITHOUT COMPLICATION, WITHOUT LONG-TERM CURRENT USE OF INSULIN (H): ICD-10-CM

## 2019-06-11 NOTE — TELEPHONE ENCOUNTER
"Requested Prescriptions   Pending Prescriptions Disp Refills     glipiZIDE (GLUCOTROL XL) 10 MG 24 hr tablet [Pharmacy Med Name: GLIPIZIDE ER 10 MG TABLET] 30 tablet 0     Sig: TAKE 1 TABLET BY MOUTH EVERY DAY   Last Written Prescription Date:  5/7/19  Last Fill Quantity: 30,  # refills: 1   Last Office Visit: 4/6/2019 Yoshi      Return in about 6 months (around 10/6/2019) for diabetes check.     Future Office Visit:         Sulfonylurea Agents Passed - 6/11/2019  9:38 AM        Passed - Blood pressure less than 140/90 in past 6 months     BP Readings from Last 3 Encounters:   06/03/19 98/61   04/06/19 125/77   10/01/18 136/88                 Passed - Patient has documented LDL within the past 12 mos.     Recent Labs   Lab Test 04/06/19  0946   LDL 88             Passed - Patient has had a Microalbumin in the past 15 mos.     Recent Labs   Lab Test 04/06/19  0946   MICROL 142   UMALCR 83.04*             Passed - Patient has documented A1c within the specified period of time.     If HgbA1C is 8 or greater, it needs to be on file within the past 3 months.  If less than 8, must be on file within the past 6 months.     Recent Labs   Lab Test 04/06/19  0946   A1C 9.6*             Passed - Medication is active on med list        Passed - Patient is age 18 or older        Passed - No active pregnancy on record        Passed - Patient has a recent creatinine (normal) within the past 12 mos.     Recent Labs   Lab Test 04/06/19  0946   CR 0.69             Passed - Patient has not had a positive pregnancy test within the past 12 mos.        Passed - Recent (6 mo) or future (30 days) visit within the authorizing provider's specialty     Patient had office visit in the last 6 months or has a visit in the next 30 days with authorizing provider or within the authorizing provider's specialty.  See \"Patient Info\" tab in inbasket, or \"Choose Columns\" in Meds & Orders section of the refill encounter.            "

## 2019-06-13 RX ORDER — GLIPIZIDE 10 MG/1
TABLET, FILM COATED, EXTENDED RELEASE ORAL
Qty: 30 TABLET | Refills: 0 | OUTPATIENT
Start: 2019-06-13

## 2019-06-13 NOTE — TELEPHONE ENCOUNTER
Refill too early. Should not need fill until about 7/7/19.     Mary Deleon RN -- [facility]

## 2019-08-01 DIAGNOSIS — E11.9 TYPE 2 DIABETES MELLITUS WITHOUT COMPLICATION, WITHOUT LONG-TERM CURRENT USE OF INSULIN (H): ICD-10-CM

## 2019-08-01 NOTE — TELEPHONE ENCOUNTER
Requested Prescriptions   Pending Prescriptions Disp Refills     glipiZIDE (GLUCOTROL XL) 10 MG 24 hr tablet [Pharmacy Med Name: GLIPIZIDE ER 10 MG TABLET] 30 tablet 1     Sig: TAKE 1 TABLET BY MOUTH EVERY DAY   Last Written Prescription Date:  5/7/19  Last Fill Quantity: 30,  # refills: 1   Last Office Visit: 4/6/2019 Yoshi      Return in about 6 months (around 10/6/2019) for diabetes check.     Future Office Visit:    Next 5 appointments (look out 90 days)    Oct 08, 2019 10:00 AM CDT  SHORT with Tata Belle MD  [facility] [address] [place] [address], [address]  [facility] [address] [phone]             Sulfonylurea Agents Failed - 8/1/2019  8:36 AM        Failed - Patient has documented A1c within the specified period of time.     If HgbA1C is 8 or greater, it needs to be on file within the past 3 months.  If less than 8, must be on file within the past 6 months.     Recent Labs   Lab Test 04/06/19  0946   A1C 9.6*             Passed - Blood pressure less than 140/90 in past 6 months     BP Readings from Last 3 Encounters:   06/03/19 98/61   04/06/19 125/77   10/01/18 136/88                 Passed - Patient has documented LDL within the past 12 mos.     Recent Labs   Lab Test 04/06/19  0946   LDL 88             Passed - Patient has had a Microalbumin in the past 15 mos.     Recent Labs   Lab Test 04/06/19  0946   MICROL 142   UMALCR 83.04*             Passed - Medication is active on med list        Passed - Patient is age 18 or older        Passed - No active pregnancy on record        Passed - Patient has a recent creatinine (normal) within the past 12 mos.     Recent Labs   Lab Test 04/06/19  0946   CR 0.69             Passed - Patient has not had a positive pregnancy test within the past 12 mos.        Passed - Recent (6 mo) or future (30 days) visit within the authorizing provider's specialty     Patient had office visit in the last 6 months or  "has a visit in the next 30 days with authorizing provider or within the authorizing provider's specialty.  See \"Patient Info\" tab in inbasket, or \"Choose Columns\" in Meds & Orders section of the refill encounter.            "

## 2019-08-03 RX ORDER — GLIPIZIDE 10 MG/1
TABLET, FILM COATED, EXTENDED RELEASE ORAL
Qty: 30 TABLET | Refills: 1 | Status: SHIPPED | OUTPATIENT
Start: 2019-08-03 | End: 2019-09-09

## 2019-08-03 NOTE — TELEPHONE ENCOUNTER
Prescription approved per [facility] Refill Protocol.  Jennifer Polo RN

## 2019-09-29 ENCOUNTER — HEALTH MAINTENANCE LETTER (OUTPATIENT)
Age: 54
End: 2019-09-29

## 2019-09-30 DIAGNOSIS — I10 ESSENTIAL HYPERTENSION: ICD-10-CM

## 2019-09-30 NOTE — TELEPHONE ENCOUNTER
"Requested Prescriptions   Pending Prescriptions Disp Refills     lisinopril-hydrochlorothiazide (PRINZIDE/ZESTORETIC) 20-12.5 MG tablet [Pharmacy Med Name: LISINOPRIL-HCTZ 20-12.5 MG TAB] 180 tablet 1     Sig: TAKE 2 TABLETS BY MOUTH EVERY DAY   Last Written Prescription Date:  4/6/19  Last Fill Quantity: 180,  # refills: 1   Last Office Visit: 4/6/2019 Yoshi      Return in about 6 months (around 10/6/2019) for diabetes check.     Future Office Visit:    Next 5 appointments (look out 90 days)    Oct 08, 2019 10:00 AM CDT  SHORT with Tata Belle MD  [facility] [address] [place] [address], [address]  [facility] [address] [phone]             Diuretics (Including Combos) Protocol Passed - 9/30/2019  1:31 AM        Passed - Blood pressure under 140/90 in past 12 months     BP Readings from Last 3 Encounters:   06/03/19 98/61   04/06/19 125/77   10/01/18 136/88                 Passed - Recent (12 mo) or future (30 days) visit within the authorizing provider's specialty     Patient has had an office visit with the authorizing provider or a provider within the authorizing providers department within the previous 12 mos or has a future within next 30 days. See \"Patient Info\" tab in inbasket, or \"Choose Columns\" in Meds & Orders section of the refill encounter.              Passed - Medication is active on med list        Passed - Patient is age 18 or older        Passed - No active pregancy on record        Passed - Normal serum creatinine on file in past 12 months     Recent Labs   Lab Test 04/06/19  0946   CR 0.69              Passed - Normal serum potassium on file in past 12 months     Recent Labs   Lab Test 04/06/19  0946   POTASSIUM 3.9                    Passed - Normal serum sodium on file in past 12 months     Recent Labs   Lab Test 04/06/19  0946                 Passed - No positive pregnancy test in past 12 months        "

## 2019-10-01 RX ORDER — LISINOPRIL AND HYDROCHLOROTHIAZIDE 12.5; 2 MG/1; MG/1
TABLET ORAL
Qty: 180 TABLET | Refills: 0 | Status: SHIPPED | OUTPATIENT
Start: 2019-10-01 | End: 2019-10-29

## 2019-10-01 NOTE — TELEPHONE ENCOUNTER
Patient has an upcoming appointment with Dr. Belle.  Will give refill to get her through until her appointment.    Taylor Zapata RN

## 2019-10-14 ENCOUNTER — TELEPHONE (OUTPATIENT)
Dept: FAMILY MEDICINE | Facility: CLINIC | Age: 54
End: 2019-10-14

## 2019-10-14 NOTE — TELEPHONE ENCOUNTER
Panel Management Review      Patient has the following on her problem list:   Diabetes    ASA: Passed    Last A1C  Lab Results   Component Value Date    A1C 9.6 04/06/2019    A1C 7.7 10/01/2018    A1C 7.0 02/05/2018    A1C 6.6 08/28/2017    A1C 6.6 12/19/2014     A1C tested: FAILED    Last LDL:    Lab Results   Component Value Date    CHOL 165 04/06/2019     Lab Results   Component Value Date    HDL 56 04/06/2019     Lab Results   Component Value Date    LDL 88 04/06/2019     Lab Results   Component Value Date    TRIG 106 04/06/2019     Lab Results   Component Value Date    CHOLHDLRATIO 3.6 10/09/2013     Lab Results   Component Value Date    NHDL 109 04/06/2019       Is the patient on a Statin? YES             Is the patient on Aspirin? YES    Medications     HMG CoA Reductase Inhibitors     atorvastatin (LIPITOR) 20 MG tablet       Salicylates     aspirin (ASA) 81 MG EC tablet             Last three blood pressure readings:  BP Readings from Last 3 Encounters:   06/03/19 98/61   04/06/19 125/77   10/01/18 136/88       Date of last diabetes office visit: 4/06/19     Tobacco History:     History   Smoking Status     Former Smoker     Types: Cigarettes     Quit date: 1/22/2018   Smokeless Tobacco     Never Used     Comment: 4-5 per day           Composite cancer screening  Chart review shows that this patient is due/due soon for the following None  Summary:    Patient is due/failing the following:   A1C and FOLLOW UP    Action needed:   Patient needs office visit for DIABETES MANAGEMENT.    Type of outreach:    Sent [employer] message.    Questions for provider review:    None                                                                                                                                    Lisa Magill, CMA       Chart routed to Care Team .

## 2019-10-22 NOTE — TELEPHONE ENCOUNTER
Patient called back.  Assisted patient in scheduling (2) appointments.  Physical scheduled on 10/29/19 and Diabetes Management scheduled on 12/04/19.  Lisa Magill, CMA

## 2019-10-28 ENCOUNTER — HEALTH MAINTENANCE LETTER (OUTPATIENT)
Age: 54
End: 2019-10-28

## 2019-10-29 ENCOUNTER — OFFICE VISIT (OUTPATIENT)
Dept: FAMILY MEDICINE | Facility: CLINIC | Age: 54
End: 2019-10-29
Payer: COMMERCIAL

## 2019-10-29 VITALS
SYSTOLIC BLOOD PRESSURE: 110 MMHG | TEMPERATURE: 97.8 F | HEART RATE: 90 BPM | BODY MASS INDEX: 37.76 KG/M2 | HEIGHT: 61 IN | RESPIRATION RATE: 14 BRPM | DIASTOLIC BLOOD PRESSURE: 72 MMHG | OXYGEN SATURATION: 97 % | WEIGHT: 200 LBS

## 2019-10-29 DIAGNOSIS — E11.9 TYPE 2 DIABETES MELLITUS WITHOUT COMPLICATION, WITHOUT LONG-TERM CURRENT USE OF INSULIN (H): ICD-10-CM

## 2019-10-29 DIAGNOSIS — Z00.00 ROUTINE GENERAL MEDICAL EXAMINATION AT A HEALTH CARE FACILITY: Primary | ICD-10-CM

## 2019-10-29 DIAGNOSIS — E78.5 HYPERLIPIDEMIA WITH TARGET LDL LESS THAN 100: ICD-10-CM

## 2019-10-29 DIAGNOSIS — Z23 NEED FOR VACCINATION: ICD-10-CM

## 2019-10-29 DIAGNOSIS — Z12.31 ENCOUNTER FOR SCREENING MAMMOGRAM FOR BREAST CANCER: ICD-10-CM

## 2019-10-29 DIAGNOSIS — I10 ESSENTIAL HYPERTENSION: ICD-10-CM

## 2019-10-29 DIAGNOSIS — Z12.4 SCREENING FOR MALIGNANT NEOPLASM OF CERVIX: ICD-10-CM

## 2019-10-29 LAB — HBA1C MFR BLD: 9.9 % (ref 0–5.6)

## 2019-10-29 PROCEDURE — 82306 VITAMIN D 25 HYDROXY: CPT | Performed by: NURSE PRACTITIONER

## 2019-10-29 PROCEDURE — 87624 HPV HI-RISK TYP POOLED RSLT: CPT | Performed by: NURSE PRACTITIONER

## 2019-10-29 PROCEDURE — 99396 PREV VISIT EST AGE 40-64: CPT | Mod: 25 | Performed by: NURSE PRACTITIONER

## 2019-10-29 PROCEDURE — 90471 IMMUNIZATION ADMIN: CPT | Performed by: NURSE PRACTITIONER

## 2019-10-29 PROCEDURE — 99213 OFFICE O/P EST LOW 20 MIN: CPT | Mod: 25 | Performed by: NURSE PRACTITIONER

## 2019-10-29 PROCEDURE — G0145 SCR C/V CYTO,THINLAYER,RESCR: HCPCS | Performed by: NURSE PRACTITIONER

## 2019-10-29 PROCEDURE — 90682 RIV4 VACC RECOMBINANT DNA IM: CPT | Performed by: NURSE PRACTITIONER

## 2019-10-29 PROCEDURE — 36415 COLL VENOUS BLD VENIPUNCTURE: CPT | Performed by: NURSE PRACTITIONER

## 2019-10-29 PROCEDURE — 80061 LIPID PANEL: CPT | Performed by: NURSE PRACTITIONER

## 2019-10-29 PROCEDURE — 80053 COMPREHEN METABOLIC PANEL: CPT | Performed by: NURSE PRACTITIONER

## 2019-10-29 PROCEDURE — 83036 HEMOGLOBIN GLYCOSYLATED A1C: CPT | Performed by: NURSE PRACTITIONER

## 2019-10-29 RX ORDER — GLIPIZIDE 10 MG/1
10 TABLET, FILM COATED, EXTENDED RELEASE ORAL DAILY
Qty: 90 TABLET | Refills: 1 | Status: SHIPPED | OUTPATIENT
Start: 2019-10-29 | End: 2019-12-04

## 2019-10-29 RX ORDER — METFORMIN HCL 500 MG
1000 TABLET, EXTENDED RELEASE 24 HR ORAL 2 TIMES DAILY WITH MEALS
Qty: 360 TABLET | Refills: 1 | Status: SHIPPED | OUTPATIENT
Start: 2019-10-29 | End: 2019-12-04

## 2019-10-29 RX ORDER — LISINOPRIL AND HYDROCHLOROTHIAZIDE 12.5; 2 MG/1; MG/1
TABLET ORAL
Qty: 180 TABLET | Refills: 1 | Status: SHIPPED | OUTPATIENT
Start: 2019-10-29 | End: 2019-12-04

## 2019-10-29 ASSESSMENT — ENCOUNTER SYMPTOMS
EYE PAIN: 0
HEMATOCHEZIA: 0
ABDOMINAL PAIN: 0
FREQUENCY: 0
DIZZINESS: 0
HEMATURIA: 0
CONSTIPATION: 0
COUGH: 0
CHILLS: 0
NERVOUS/ANXIOUS: 0
FEVER: 0
DIARRHEA: 0

## 2019-10-29 ASSESSMENT — MIFFLIN-ST. JEOR: SCORE: 1458.53

## 2019-10-29 NOTE — PROGRESS NOTES
"   SUBJECTIVE:   CC: Vikki Medina is an 54 year old woman who presents for preventive health visit.     Healthy Habits:     Getting at least 3 servings of Calcium per day:  Yes    Bi-annual eye exam:  Yes    Dental care twice a year:  Yes    Sleep apnea or symptoms of sleep apnea:  None    Diet:  Regular (no restrictions)    Frequency of exercise:  2-3 days/week    Duration of exercise:  15-30 minutes    Taking medications regularly:  Yes    Medication side effects:  None    PHQ-2 Total Score: 0    Additional concerns today:  No    Diabetes: \"Diet is the same\".  Tries to cut down on carbs/sugars.  She walks a lot at work.  Does 15 minutes of warm up stretching before her work day.  In April A1c had increased and a second medication was added.  Does not check BG at home.     BP: checks BP everyday at home.  Reports numbers are usually around 120/80s.  Denies CP, SOB, orthopnea, and headache.     No longer taking vitamin D.     She reports she is tolerating her medications well and without side effects.     Overall feeling good.     Today's PHQ-2 Score:   PHQ-2 (  [employer]) 10/29/2019   Q1: Little interest or pleasure in doing things 0   Q2: Feeling down, depressed or hopeless 0   PHQ-2 Score 0   Q1: Little interest or pleasure in doing things Not at all   Q2: Feeling down, depressed or hopeless Not at all   PHQ-2 Score 0       Abuse: Current or Past(Physical, Sexual or Emotional)- No  Do you feel safe in your environment? Yes        Social History     Tobacco Use     Smoking status: Former Smoker     Types: Cigarettes     Last attempt to quit: 2018     Years since quittin.7     Smokeless tobacco: Never Used     Tobacco comment: 4-5 per day   Substance Use Topics     Alcohol use: No     Alcohol/week: 0.0 standard drinks       Alcohol Use 10/29/2019   Prescreen: >3 drinks/day or >7 drinks/week? Not Applicable   Prescreen: >3 drinks/day or >7 drinks/week? -   No flowsheet data found.    Reviewed " orders with patient.  Reviewed health maintenance and updated orders accordingly - Yes  BP Readings from Last 3 Encounters:   10/29/19 110/72   19 98/61   19 125/77    Wt Readings from Last 3 Encounters:   10/29/19 90.7 kg (200 lb)   19 90.8 kg (200 lb 3.2 oz)   10/01/18 89.4 kg (197 lb)                  Current Outpatient Medications   Medication Sig Dispense Refill     aspirin (ASA) 81 MG EC tablet TAKE 1 TABLET (81 MG) BY MOUTH DAILY 90 tablet 3     atorvastatin (LIPITOR) 20 MG tablet Take 1 tablet (20 mg) by mouth daily 90 tablet 3     cholecalciferol (VITAMIN D3) 26107 units (1250 mcg) capsule TAKE 1 CAPSULE (50,000 UNITS) BY MOUTH TWICE A WEEK 8 capsule 0     glipiZIDE (GLUCOTROL XL) 10 MG 24 hr tablet TAKE 1 TABLET BY MOUTH EVERY DAY 30 tablet 0     lisinopril-hydrochlorothiazide (PRINZIDE/ZESTORETIC) 20-12.5 MG tablet TAKE 2 TABLETS BY MOUTH EVERY  tablet 0     metFORMIN (GLUCOPHAGE-XR) 500 MG 24 hr tablet Take 2 tablets (1,000 mg) by mouth 2 times daily (with meals) 360 tablet 1     vitamin D3 (CHOLECALCIFEROL) 78614 units capsule Take 1 capsule (50,000 Units) by mouth twice a week 8 capsule 1     Allergies   Allergen Reactions     Sulfa Drugs        Mammogram Screening: Patient over age 50, mutual decision to screen reflected in health maintenance.    Pertinent mammograms are reviewed under the imaging tab.  History of abnormal Pap smear: NO - age 30-65 PAP every 5 years with negative HPV co-testing recommended  PAP / HPV 12/15/2016 10/3/2013 4/3/2012   PAP NIL NIL NIL     Reviewed and updated as needed this visit by clinical staff         Reviewed and updated as needed this visit by Provider        Past Medical History:   Diagnosis Date     Delivery normal     , no complications      Past Surgical History:   Procedure Laterality Date     COLONOSCOPY N/A 6/3/2019    Procedure: COLONOSCOPY;  Surgeon: Javi Joseph MD;  Location: [facility]     left arm benign tumor removed   1995     TUBAL LIGATION  2005       Review of Systems   Constitutional: Negative for chills and fever.   HENT: Negative for congestion and ear pain.    Eyes: Negative for pain.   Respiratory: Negative for cough.    Cardiovascular: Negative for chest pain.   Gastrointestinal: Negative for abdominal pain, constipation, diarrhea and hematochezia.   Genitourinary: Negative for frequency and hematuria.   Neurological: Negative for dizziness.   Psychiatric/Behavioral: The patient is not nervous/anxious.         OBJECTIVE:   There were no vitals taken for this visit.  Physical Exam  GENERAL APPEARANCE: healthy, alert and no distress  EYES: Eyes grossly normal to inspection, PERRL and conjunctivae and sclerae normal  HENT: ear canals and TM's normal, nose and mouth without ulcers or lesions, oropharynx clear and oral mucous membranes moist  NECK: no adenopathy, no asymmetry, masses, or scars and thyroid normal to palpation  RESP: lungs clear to auscultation - no rales, rhonchi or wheezes  BREAST: normal without masses, tenderness or nipple discharge and no palpable axillary masses or adenopathy  CV: regular rate and rhythm, normal S1 S2, no S3 or S4, no murmur, click or rub, no peripheral edema and peripheral pulses strong  ABDOMEN: soft, nontender, no hepatosplenomegaly, no masses and bowel sounds normal   (female): normal female external genitalia, normal urethral meatus, vaginal mucosal atrophy noted, normal cervix, adnexae, and uterus without masses or abnormal discharge  MS: no musculoskeletal defects are noted and gait is age appropriate without ataxia  SKIN: no suspicious lesions or rashes  NEURO: Normal strength and tone, sensory exam grossly normal, mentation intact and speech normal  PSYCH: mentation appears normal and affect normal/bright    Diagnostic Test Results:  Labs reviewed in Epic    ASSESSMENT/PLAN:   1. Routine general medical examination at a health care facility  Normal exam today; fasting for her  "labs.   - Lipid panel reflex to direct LDL Fasting  - Vitamin D Deficiency  - Comprehensive metabolic panel    2. Type 2 diabetes mellitus without complication, without long-term current use of insulin (H)  Due for labs today; continue medications.  Due for eye exam; does this at [employer].   - metFORMIN (GLUCOPHAGE-XR) 500 MG 24 hr tablet; Take 2 tablets (1,000 mg) by mouth 2 times daily (with meals)  Dispense: 360 tablet; Refill: 1  - glipiZIDE (GLUCOTROL XL) 10 MG 24 hr tablet; Take 1 tablet (10 mg) by mouth daily  Dispense: 90 tablet; Refill: 1  - Hemoglobin A1c  - Lipid panel reflex to direct LDL Fasting    3. Essential hypertension  Well controlled; continue current medication.   - lisinopril-hydrochlorothiazide (PRINZIDE/ZESTORETIC) 20-12.5 MG tablet; TAKE 2 TABLETS BY MOUTH EVERY DAY  Dispense: 180 tablet; Refill: 1    4. Encounter for screening mammogram for breast cancer  - *MA Screening Digital Bilateral; Future    5. Screening for malignant neoplasm of cervix  - Pap imaged thin layer screen with HPV - recommended age 30 - 65 years (select HPV order below)  - HPV High Risk Types DNA Cervical    6. Hyperlipidemia with target LDL less than 100  Continue statin; does not need this refilled today.   - Lipid panel reflex to direct LDL Fasting    7. Need for vaccination  Administered flu and shingrix today.   - C RIV4 (FLUBLOK) VACCINE RECOMBINANT DNA PRSRV ANTIBIO FREE, IM [64014]    COUNSELING:  Reviewed preventive health counseling, as reflected in patient instructions       Regular exercise       Healthy diet/nutrition       Vision screening    Estimated body mass index is 37.52 kg/m  as calculated from the following:    Height as of 2/5/18: 1.556 m (5' 1.25\").    Weight as of 4/6/19: 90.8 kg (200 lb 3.2 oz).    Weight management plan: Discussed healthy diet and exercise guidelines     reports that she quit smoking about 21 months ago. Her smoking use included cigarettes. She has never used smokeless " tobacco.      Counseling Resources:  ATP IV Guidelines  Pooled Cohorts Equation Calculator  Breast Cancer Risk Calculator  FRAX Risk Assessment  ICSI Preventive Guidelines  Dietary Guidelines for Americans, 2010  USDA's MyPlate  ASA Prophylaxis  Lung CA Screening    ARIELLE Patricio [facility]

## 2019-10-30 LAB
ALBUMIN SERPL-MCNC: 3.8 G/DL (ref 3.4–5)
ALP SERPL-CCNC: 121 U/L (ref 40–150)
ALT SERPL W P-5'-P-CCNC: 28 U/L (ref 0–50)
ANION GAP SERPL CALCULATED.3IONS-SCNC: 9 MMOL/L (ref 3–14)
AST SERPL W P-5'-P-CCNC: 14 U/L (ref 0–45)
BILIRUB SERPL-MCNC: 0.4 MG/DL (ref 0.2–1.3)
BUN SERPL-MCNC: 21 MG/DL (ref 7–30)
CALCIUM SERPL-MCNC: 9.2 MG/DL (ref 8.5–10.1)
CHLORIDE SERPL-SCNC: 105 MMOL/L (ref 94–109)
CHOLEST SERPL-MCNC: 171 MG/DL
CO2 SERPL-SCNC: 22 MMOL/L (ref 20–32)
CREAT SERPL-MCNC: 0.76 MG/DL (ref 0.52–1.04)
DEPRECATED CALCIDIOL+CALCIFEROL SERPL-MC: 34 UG/L (ref 20–75)
GFR SERPL CREATININE-BSD FRML MDRD: >90 ML/MIN/{1.73_M2}
GLUCOSE SERPL-MCNC: 253 MG/DL (ref 70–99)
HDLC SERPL-MCNC: 53 MG/DL
LDLC SERPL CALC-MCNC: 82 MG/DL
NONHDLC SERPL-MCNC: 118 MG/DL
POTASSIUM SERPL-SCNC: 4 MMOL/L (ref 3.4–5.3)
PROT SERPL-MCNC: 7.7 G/DL (ref 6.8–8.8)
SODIUM SERPL-SCNC: 136 MMOL/L (ref 133–144)
TRIGL SERPL-MCNC: 181 MG/DL

## 2019-11-01 LAB
COPATH REPORT: NORMAL
PAP: NORMAL

## 2019-11-05 LAB
FINAL DIAGNOSIS: NORMAL
HPV HR 12 DNA CVX QL NAA+PROBE: NEGATIVE
HPV16 DNA SPEC QL NAA+PROBE: NEGATIVE
HPV18 DNA SPEC QL NAA+PROBE: NEGATIVE
SPECIMEN DESCRIPTION: NORMAL
SPECIMEN SOURCE CVX/VAG CYTO: NORMAL

## 2019-12-04 ENCOUNTER — OFFICE VISIT (OUTPATIENT)
Dept: FAMILY MEDICINE | Facility: CLINIC | Age: 54
End: 2019-12-04
Payer: COMMERCIAL

## 2019-12-04 VITALS
BODY MASS INDEX: 37.44 KG/M2 | HEART RATE: 112 BPM | WEIGHT: 199.8 LBS | TEMPERATURE: 98.4 F | RESPIRATION RATE: 20 BRPM | DIASTOLIC BLOOD PRESSURE: 62 MMHG | SYSTOLIC BLOOD PRESSURE: 110 MMHG

## 2019-12-04 DIAGNOSIS — E11.65 TYPE 2 DIABETES MELLITUS WITH HYPERGLYCEMIA, WITHOUT LONG-TERM CURRENT USE OF INSULIN (H): Primary | ICD-10-CM

## 2019-12-04 DIAGNOSIS — I10 ESSENTIAL HYPERTENSION: ICD-10-CM

## 2019-12-04 DIAGNOSIS — E55.9 VITAMIN D DEFICIENCY: ICD-10-CM

## 2019-12-04 DIAGNOSIS — E78.5 HYPERLIPIDEMIA WITH TARGET LDL LESS THAN 100: ICD-10-CM

## 2019-12-04 PROCEDURE — 99214 OFFICE O/P EST MOD 30 MIN: CPT | Performed by: FAMILY MEDICINE

## 2019-12-04 RX ORDER — METFORMIN HCL 500 MG
1000 TABLET, EXTENDED RELEASE 24 HR ORAL 2 TIMES DAILY WITH MEALS
Qty: 360 TABLET | Refills: 1 | Status: SHIPPED | OUTPATIENT
Start: 2019-12-04 | End: 2020-10-12

## 2019-12-04 RX ORDER — CHOLECALCIFEROL (VITAMIN D3) 1250 MCG
CAPSULE ORAL
Qty: 8 CAPSULE | Refills: 0 | Status: CANCELLED | OUTPATIENT
Start: 2019-12-04

## 2019-12-04 RX ORDER — GLIPIZIDE 10 MG/1
20 TABLET, FILM COATED, EXTENDED RELEASE ORAL DAILY
Qty: 180 TABLET | Refills: 1 | Status: SHIPPED | OUTPATIENT
Start: 2019-12-04 | End: 2020-11-03

## 2019-12-04 RX ORDER — ATORVASTATIN CALCIUM 20 MG/1
20 TABLET, FILM COATED ORAL DAILY
Qty: 90 TABLET | Refills: 3 | Status: SHIPPED | OUTPATIENT
Start: 2019-12-04 | End: 2021-02-22

## 2019-12-04 RX ORDER — LISINOPRIL AND HYDROCHLOROTHIAZIDE 12.5; 2 MG/1; MG/1
TABLET ORAL
Qty: 180 TABLET | Refills: 1 | Status: SHIPPED | OUTPATIENT
Start: 2019-12-04 | End: 2019-12-05

## 2019-12-04 NOTE — PROGRESS NOTES
Subjective     Vikki Medina is a 54 year old female who presents to clinic today for the following health issues:    History of Present Illness        Diabetes:   She presents for follow up of diabetes.  She is checking home blood glucose one time daily. She checks blood glucose after meals.  Blood glucose is sometimes over 200 and never under 70. When her blood glucose is low, the patient is asymptomatic for confusion, blurred vision, lethargy and reports not feeling dizzy, shaky, or weak.  She is concerned about blood sugar frequently over 200. She is not experiencing numbness or burning in feet, excessive thirst, blurry vision, weight changes or redness, sores or blisters on feet. The patient has not had a diabetic eye exam in the last 12 months.     Diabetes Management Resources    She eats 2-3 servings of fruits and vegetables daily.She consumes 1 sweetened beverage(s) daily.  She is taking medications regularly.     Was drinking soda, diet coke daily and cutting this out. Snacking between meals, eating baked goods at work, meals are brought to work, lunch-rice, meat veggies, rice. Will eat fruit, 3 per day. Does not drink juice. Eats rice when she wakes up in the morning. Some meat veggies.   Hyperlipidemia Follow-Up      Are you regularly taking any medication or supplement to lower your cholesterol?   Yes- statin    Are you having muscle aches or other side effects that you think could be caused by your cholesterol lowering medication?  No    Hypertension Follow-up      Do you check your blood pressure regularly outside of the clinic? No     Are you following a low salt diet? No    Are your blood pressures ever more than 140 on the top number (systolic) OR more   than 90 on the bottom number (diastolic), for example 140/90? No      How many servings of fruits and vegetables do you eat daily?  2-3    On average, how many sweetened beverages do you drink each day (Examples: soda, juice, sweet tea,  etc.  Do NOT count diet or artificially sweetened beverages)?   3    How many days per week do you miss taking your medication? 0    PROBLEMS TO ADD ON...    Recent Labs   Lab Test 10/29/19  1346 04/06/19  0946 10/01/18  0955   A1C 9.9* 9.6* 7.7*   LDL 82 88 111*   HDL 53 56 58   TRIG 181* 106 111   ALT 28 26 34   CR 0.76 0.69 0.48*   GFRESTIMATED >90 >90 >90   GFRESTBLACK >90 >90 >90   POTASSIUM 4.0 3.9 3.8   TSH  --  1.20 2.51      BP Readings from Last 3 Encounters:   12/04/19 110/62   10/29/19 110/72   06/03/19 98/61    Wt Readings from Last 3 Encounters:   12/04/19 90.6 kg (199 lb 12.8 oz)   10/29/19 90.7 kg (200 lb)   04/06/19 90.8 kg (200 lb 3.2 oz)                    Reviewed and updated as needed this visit by Provider         Review of Systems   ROS COMP: CONSTITUTIONAL: NEGATIVE for fever, chills, change in weight  ENT/MOUTH: NEGATIVE for ear, mouth and throat problems  RESP: NEGATIVE for significant cough or SOB  CV: NEGATIVE for chest pain, palpitations or peripheral edema      Objective    /62 (BP Location: Right arm, Patient Position: Sitting, Cuff Size: Adult Regular)   Pulse 112   Temp 98.4  F (36.9  C) (Oral)   Resp 20   Wt 90.6 kg (199 lb 12.8 oz)   BMI 37.44 kg/m    Body mass index is 37.44 kg/m .  Physical Exam   GENERAL: healthy, alert and no distress  EYES: Eyes grossly normal to inspection, PERRL and conjunctivae and sclerae normal  HENT: ear canals and TM's normal, nose and mouth without ulcers or lesions  NECK: no adenopathy, no asymmetry, masses, or scars and thyroid normal to palpation  RESP: lungs clear to auscultation - no rales, rhonchi or wheezes  CV: regular rate and rhythm, normal S1 S2, no S3 or S4, no murmur, click or rub, no peripheral edema and peripheral pulses strong  MS: no gross musculoskeletal defects noted, no edema  NEURO: Normal strength and tone, mentation intact and speech normal  PSYCH: mentation appears normal, affect normal/bright    Diagnostic Test  "Results:  Labs reviewed in Epic        Assessment & Plan     1. Type 2 diabetes mellitus with hyperglycemia, without long-term current use of insulin (H)    - glipiZIDE (GLUCOTROL XL) 10 MG 24 hr tablet; Take 2 tablets (20 mg) by mouth daily  Dispense: 180 tablet; Refill: 1  - metFORMIN (GLUCOPHAGE-XR) 500 MG 24 hr tablet; Take 2 tablets (1,000 mg) by mouth 2 times daily (with meals)  Dispense: 360 tablet; Refill: 1  - AMBULATORY ADULT DIABETES EDUCATOR REFERRAL  - **A1C FUTURE 3mo; Future  a1c out of control, discussed starting insulin if unable to bring numbers down, pt willing to see diabetes educator    2. Essential hypertension  Controlled, cont same  - lisinopril-hydrochlorothiazide (PRINZIDE/ZESTORETIC) 20-12.5 MG tablet; TAKE 2 TABLETS BY MOUTH EVERY DAY  Dispense: 180 tablet; Refill: 1    3. Vitamin D deficiency  Continue 1000 per day, no need to continue to take high dose    4. Hyperlipidemia with target LDL less than 100  Cont same,   LDL Cholesterol Calculated   Date Value Ref Range Status   10/29/2019 82 <100 mg/dL Final     Comment:     Desirable:       <100 mg/dl     Good control, cont statin, low dose for now  - atorvastatin (LIPITOR) 20 MG tablet; Take 1 tablet (20 mg) by mouth daily  Dispense: 90 tablet; Refill: 3     BMI:   Estimated body mass index is 37.44 kg/m  as calculated from the following:    Height as of 10/29/19: 1.556 m (5' 1.25\").    Weight as of this encounter: 90.6 kg (199 lb 12.8 oz).   Weight management plan: Discussed healthy diet and exercise guidelines        Work on weight loss  Regular exercise    Return in about 3 months (around 3/4/2020) for diabetes check.    Tata Belle MD  [facility]    "

## 2019-12-04 NOTE — NURSING NOTE
"Chief Complaint   Patient presents with     Diabetes     Initial /62 (BP Location: Right arm, Patient Position: Sitting, Cuff Size: Adult Regular)   Pulse 112   Temp 98.4  F (36.9  C) (Oral)   Resp 20   Wt 90.6 kg (199 lb 12.8 oz)   BMI 37.44 kg/m   Estimated body mass index is 37.44 kg/m  as calculated from the following:    Height as of 10/29/19: 1.556 m (5' 1.25\").    Weight as of this encounter: 90.6 kg (199 lb 12.8 oz).  BP completed using cuff size regular right arm    Lisa Magill, CMA    "

## 2019-12-05 ENCOUNTER — MYC REFILL (OUTPATIENT)
Dept: FAMILY MEDICINE | Facility: CLINIC | Age: 54
End: 2019-12-05

## 2019-12-05 ENCOUNTER — TELEPHONE (OUTPATIENT)
Dept: FAMILY MEDICINE | Facility: CLINIC | Age: 54
End: 2019-12-05

## 2019-12-05 DIAGNOSIS — E11.9 TYPE 2 DIABETES MELLITUS WITHOUT COMPLICATION, WITHOUT LONG-TERM CURRENT USE OF INSULIN (H): ICD-10-CM

## 2019-12-05 DIAGNOSIS — I10 ESSENTIAL HYPERTENSION: ICD-10-CM

## 2019-12-05 DIAGNOSIS — E55.9 VITAMIN D DEFICIENCY: ICD-10-CM

## 2019-12-05 NOTE — TELEPHONE ENCOUNTER
Diabetes Education Scheduling Outreach #1:    Call to patient to schedule. Left message with phone number to call to schedule.    Plan for 2nd outreach attempt within 1 week.    Danette Rodgers  [facility]  Diabetes and Nutrition Scheduling

## 2019-12-09 NOTE — TELEPHONE ENCOUNTER
Vitamin Supplements (Adult) Protocol [id number] 8:30 PM   High dose Vitamin D not ordered     Is this ongoing?

## 2019-12-11 NOTE — TELEPHONE ENCOUNTER
Diabetes Education Scheduling Outreach #2:    Call to patient to schedule. Left message with phone number to call to schedule.    Danette Rodgers  [facility]  Diabetes and Nutrition Scheduling

## 2019-12-13 RX ORDER — LISINOPRIL AND HYDROCHLOROTHIAZIDE 12.5; 2 MG/1; MG/1
TABLET ORAL
Qty: 180 TABLET | Refills: 1 | Status: SHIPPED | OUTPATIENT
Start: 2019-12-13 | End: 2020-06-02

## 2019-12-20 NOTE — TELEPHONE ENCOUNTER
Please call pt. Our deal was starting insulin or getting the sugars down with the help of diabetic educators. If she is not able to see the educators, we will have to talk about insulin shots, starting with the once daily, just to get her sugars down. They are much to high.

## 2019-12-21 ENCOUNTER — NURSE TRIAGE (OUTPATIENT)
Dept: NURSING | Facility: CLINIC | Age: 54
End: 2019-12-21

## 2019-12-21 NOTE — TELEPHONE ENCOUNTER
[facility]NA returned call to patient and informed of message re: need for f/u with diabetic educators or needs to start insulin.  Caller verbalized understanding and also says blood sugar has been in acceptable range since she was last seen since she changed her eating habits.      Message from Malini Mckeon sent at 12/21/2019 12:10 PM CST     Summary: NONE    Reason for call:  Other   Patient called regarding (reason for call): call back  Additional comments: someone called yesterday at the clinic and didn't leave a voicemail for patient.  Wondering if Anna Belle MD at [facility] needs something for patient?    Phone number to reach patient:  Home number on file [phone] (home)    Best Time:  ANYTIME    Can we leave a detailed message on this number?  YES                  Reason for Disposition    Health Information question, no triage required and triager able to answer question    Additional Information    Negative: [1] Caller is not with the adult (patient) AND [2] reporting urgent symptoms    Negative: Lab result questions    Negative: Medication questions    Negative: Caller can't be reached by phone    Negative: Caller has already spoken to PCP or another triager    Negative: Requesting regular office appointment    Negative: [1] Caller requesting NON-URGENT health information AND [2] PCP's office is the best resource    Protocols used: INFORMATION ONLY CALL-A-

## 2019-12-31 ENCOUNTER — OFFICE VISIT (OUTPATIENT)
Dept: FAMILY MEDICINE | Facility: CLINIC | Age: 54
End: 2019-12-31
Payer: OTHER MISCELLANEOUS

## 2019-12-31 VITALS
SYSTOLIC BLOOD PRESSURE: 92 MMHG | HEART RATE: 87 BPM | DIASTOLIC BLOOD PRESSURE: 56 MMHG | BODY MASS INDEX: 36.92 KG/M2 | WEIGHT: 197 LBS | OXYGEN SATURATION: 98 % | RESPIRATION RATE: 16 BRPM | TEMPERATURE: 98.5 F

## 2019-12-31 DIAGNOSIS — M75.41 IMPINGEMENT SYNDROME OF SHOULDER REGION, RIGHT: Primary | ICD-10-CM

## 2019-12-31 DIAGNOSIS — M75.21 BICEPS TENDONITIS, RIGHT: ICD-10-CM

## 2019-12-31 PROCEDURE — 99214 OFFICE O/P EST MOD 30 MIN: CPT | Performed by: FAMILY MEDICINE

## 2019-12-31 ASSESSMENT — ENCOUNTER SYMPTOMS
CONSTITUTIONAL NEGATIVE: 1
ARTHRALGIAS: 1
JOINT SWELLING: 0
NEUROLOGICAL NEGATIVE: 1

## 2019-12-31 NOTE — LETTER
[address], [address]  [facility] [address]  Phone: [phone]  Fax: [phone]    December 31, 2019        Vikki Medina  [address]  [facility] [address]          To whom it may concern:    RE: Vikki Medina    Patient was seen and treated today at our clinic.  When the patient returns to work, the following restrictions apply until 1/12/20:    Lift, carry, push, and pull no more than: 11-20 lbs.     Please contact me for questions or concerns.      Sincerely,        Eleno Henley MD

## 2019-12-31 NOTE — PROGRESS NOTES
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  Had previous simlar injuries, but they recovered failry quickly.  Pain is on the front of her shoulder and hurts most when elevating elbow laterally.  Did do some exercises from work clinic, some therapeutic taping, but still hurting.  NO numbness or weakness or arm.         Review of Systems   Constitutional: Negative.    Musculoskeletal: Positive for arthralgias. Negative for joint swelling.   Neurological: Negative.         Objective    BP 92/56 (BP Location: Right arm, Patient Position: Sitting, Cuff Size: Adult Regular)   Pulse 87   Temp 98.5  F (36.9  C) (Oral)   Resp 16   Wt 89.4 kg (197 lb)   SpO2 98%   BMI 36.92 kg/m    Body mass index is 36.92 kg/m .  Physical Exam  Vitals signs and nursing note reviewed.   Musculoskeletal:      Right shoulder: She exhibits tenderness and pain. She exhibits normal range of motion, no deformity and normal strength.      Comments: Pain with biceps flexion at tendon insertion.  Positive Neer test.  No notable rotator cuff weakness or pain.  Tenderness over lateral shoulder and long belly biceps tendon.   Skin:     General: Skin is warm and dry.   Neurological:      General: No focal deficit present.      Mental Status: She is oriented to person, place, and time.   Psychiatric:         Mood and Affect: Mood normal.         Behavior: Behavior normal.         Assessment and Plan    (M75.41) Impingement syndrome of shoulder region, right  (primary encounter diagnosis)  Comment: also discussed scheduled ibuprofen and ice, may return for steroid injectin if that is not helpful  Plan: [facility]            (M75.21) Biceps tendonitis, right  Comment:   Plan: [facility]              RTC in      Eleno Henley MD

## 2020-01-14 ENCOUNTER — TELEPHONE (OUTPATIENT)
Dept: FAMILY MEDICINE | Facility: CLINIC | Age: 55
End: 2020-01-14

## 2020-01-14 NOTE — TELEPHONE ENCOUNTER
Reason for Call:  Other Extension on Restrictions for work     Detailed comments: patient called states that she needs an extension on her restrictions as her pain is not going away, states her work did not follow the restrictions listed .    No open appointments to follow up till February and patient didn't like that.     Looking to have a letter written again to extend     Phone Number Patient can be reached at: Home number on file [phone] (home)    Best Time: Any     Can we leave a detailed message on this number? YES    Call taken on 1/14/2020 at 2:29 PM by Lorraine Eckert

## 2020-01-14 NOTE — LETTER
[address], [address]  [facility] [address]  Phone: [phone]  Fax: [phone]    January 16, 2020        Vikki Medina  [id number]  [facility] [address]          To whom it may concern:    RE: Vikki Pau Medina    When the patient returns to work, the following restrictions apply until 2/14/20: Do not Lift, carry, push, and pull any weight    Please contact me for questions or concerns.      Sincerely,        Eleno Henley MD

## 2020-01-15 NOTE — TELEPHONE ENCOUNTER
Please call pt, I would like her to start insulin once daily. We can do the teaching here or she can see the diabetes educator.

## 2020-01-16 NOTE — TELEPHONE ENCOUNTER
I can extend the letter, but I'm not sure how helpful that will be if work is going to ignore it.    Has she been following the treatment plan of ibuprofen and icing?    I can get in in a same-day slot for follow-up and injection if she likes.    Eleno Henley MD

## 2020-01-16 NOTE — TELEPHONE ENCOUNTER
Patient is also requesting in the letter if we can put No Lift, carry, push, and pull at all, instead of Lift, carry, push, and pull no more than: 11-20 lbs?      Patient would like to know if the letter will be in her mychart or does she need to pick it up when completed?    Rhoda Spencer

## 2020-01-16 NOTE — TELEPHONE ENCOUNTER
Left message to call back. Clinic has important message for patient.     When calls back needs to schedule appointment for insulin education with clinic or diabetic education.     Also sent a mychart message.       Claudia Acosta RN Flex

## 2020-01-16 NOTE — TELEPHONE ENCOUNTER
I do not know of a way to make that letter availbale through [employer].    Printed and signed    Eleno Henley MD

## 2020-01-16 NOTE — TELEPHONE ENCOUNTER
Patient states she would like 4 weeks for extension.    Patient states she does not want a injection at this time    Patient states she is going to start PT    Patient states she is continuing to ice and use ibuprofen    Cande Nix

## 2020-01-23 ENCOUNTER — THERAPY VISIT (OUTPATIENT)
Dept: PHYSICAL THERAPY | Facility: CLINIC | Age: 55
End: 2020-01-23
Attending: FAMILY MEDICINE
Payer: OTHER MISCELLANEOUS

## 2020-01-23 DIAGNOSIS — M75.41 IMPINGEMENT SYNDROME OF SHOULDER REGION, RIGHT: ICD-10-CM

## 2020-01-23 DIAGNOSIS — M25.511 PAIN IN JOINT OF RIGHT SHOULDER: ICD-10-CM

## 2020-01-23 DIAGNOSIS — M75.21 BICEPS TENDONITIS, RIGHT: ICD-10-CM

## 2020-01-23 PROCEDURE — 97110 THERAPEUTIC EXERCISES: CPT | Mod: GP | Performed by: PHYSICAL THERAPIST

## 2020-01-23 PROCEDURE — 97161 PT EVAL LOW COMPLEX 20 MIN: CPT | Mod: GP | Performed by: PHYSICAL THERAPIST

## 2020-01-23 NOTE — PROGRESS NOTES
[facility] Initial Evaluation  Subjective:  The history is provided by the patient. No  was used.   Vikki Medina being seen for R shoulder pain.   Date of Onset: 12/2019. Problem occurred: over use lifting bags at work-  Pain score: 0-8/10. General health as reported by patient is good. Pertinent medical history includes:  Diabetes and high blood pressure.   Other medical allergies details: see EPIC.    Current medications:  High blood pressure medication.   Primary job tasks include:  Lifting/carrying, computer work, pushing/pulling and repetitive tasks.  Pain is described as aching, sharp, shooting and stabbing and is intermittent. Pain is the same all the time. Since onset symptoms are unchanged.      Patient is . Restrictions include:  Currently not working due to present treatment.    Barriers include:  None as reported by patient.  Red flags:  None as reported by patient.  Type of problem:  Right shoulder   Condition occurred with:  Repetition/overuse. This is a new condition   Problem details: Pt c/o R shoulder pain started 12/2019.  Denies specific injury but relates to repetitive overuse with work as a .   Similar episode last year resolved on its own.  Pt is R handed.  MD order date 12/31/19.   Patient reports pain:  Anterior. Radiates to:  Upper arm. Associated symptoms:  Loss of strength and loss of motion/stiffness. Symptoms are exacerbated by lifting, using arm at shoulder level, carrying, using arm behind back, lying on extremity and using arm overhead and relieved by NSAID's and ice (both temporary).                      Objective:  System                   Shoulder Evaluation:  ROM:  AROM:  : pt very guarded with all R shoulder ROM.  Flexion:  Left:  155    Right:  135    Abduction:  Left: 155   Right:  125      External Rotation:  Left:  75    Right:  55            Extension/Internal Rotation:  Left:  L1     Right:  Outer hip    PROM:    Flexion:  Right: 145    Extension:  Right:  35  Abduction:  Right:  140      External Rotation:  Right:  55                    Strength:    Flexion: Left:/5 strong/pain free   Pain:    Right: 4+/5      Pain:  ++  Extension:  Left:/5 Strong/pain free    Pain:    Right: 4+/5     Pain:-/+  Abduction:  Left:/5 Strong/pain free  Pain:    Right: 4+/5      Pain:+ and ++  Adduction:  Left:/5  Strong/pain free   Pain:    Right: 5-/5     Pain:  Internal Rotation:  Left:/5  Strong/pain free    Pain:    Right: 5-/5      Pain:-/+  External Rotation:   Left:/5  Strong/pain free    Pain:   Right:/5     Pain:+              Special Tests:      Right shoulder positive for the following special tests:Impingement  Right shoulder negative for the following special tests:Rotator cuff tear  Palpation:      Right shoulder tenderness present at: Biceps  Mobility Tests:      Glenohumeral posterior right:  Hypomobile          Scapulohumeral rhythm right:  Hypermobile                                   General     ROS    Assessment/Plan:    Patient is a 54 year old female with right side shoulder complaints.    Patient has the following significant findings with corresponding treatment plan.                Diagnosis 1:  R shoulder pain  Pain -  hot/cold therapy, directional preference exercise and home program  Decreased ROM/flexibility - manual therapy and therapeutic exercise  Decreased strength - therapeutic exercise and therapeutic activities  Impaired muscle performance - neuro re-education  Decreased function - therapeutic activities  Impaired posture - neuro re-education    Therapy Evaluation Codes:   Cumulative Therapy Evaluation is: Low complexity.    Previous and current functional limitations:  (See Goal Flow Sheet for this information)    Short term and Long term goals: (See Goal Flow Sheet for this information)     Communication ability:  Patient appears to be able to clearly communicate and  understand verbal and written communication and follow directions correctly.  Treatment Explanation - The following has been discussed with the patient:   RX ordered/plan of care  Anticipated outcomes  Possible risks and side effects  This patient would benefit from PT intervention to resume normal activities.   Rehab potential is good.    Frequency:  1 X week, once daily  Duration:  for 8 weeks  Discharge Plan:  Achieve all LTG.  Independent in home treatment program.  Reach maximal therapeutic benefit.    Please refer to the daily flowsheet for treatment today, total treatment time and time spent performing 1:1 timed codes.

## 2020-01-24 ENCOUNTER — TELEPHONE (OUTPATIENT)
Dept: FAMILY MEDICINE | Facility: CLINIC | Age: 55
End: 2020-01-24

## 2020-01-24 DIAGNOSIS — M79.642 PAIN IN BOTH HANDS: Primary | ICD-10-CM

## 2020-01-24 DIAGNOSIS — M79.641 PAIN IN BOTH HANDS: Primary | ICD-10-CM

## 2020-01-24 NOTE — TELEPHONE ENCOUNTER
Please offer an appt, thank you  She is over due for labs, lets start with that, I will order some other labs for her hand pain, like rheum factor and sed rate

## 2020-01-24 NOTE — TELEPHONE ENCOUNTER
Pt calling into clinic  C/o darling pain in joints of hips and knees  Increased dose of glipizide at appt in Dec  Pt worried the increased in medication is causing the pain  She thought her Lipitor had been increased also but reviewing refills shows it stayed the same    Route to provider to review and advise    Luz Marina Barreto RN Nurse Triage

## 2020-01-29 ENCOUNTER — TRANSFERRED RECORDS (OUTPATIENT)
Dept: HEALTH INFORMATION MANAGEMENT | Facility: CLINIC | Age: 55
End: 2020-01-29

## 2020-01-29 ENCOUNTER — TRANSFERRED RECORDS (OUTPATIENT)
Dept: MULTI SPECIALTY CLINIC | Facility: CLINIC | Age: 55
End: 2020-01-29

## 2020-01-29 LAB — RETINOPATHY: NORMAL

## 2020-02-06 ENCOUNTER — OFFICE VISIT (OUTPATIENT)
Dept: FAMILY MEDICINE | Facility: CLINIC | Age: 55
End: 2020-02-06
Payer: OTHER MISCELLANEOUS

## 2020-02-06 VITALS
BODY MASS INDEX: 37 KG/M2 | WEIGHT: 196 LBS | HEART RATE: 88 BPM | OXYGEN SATURATION: 98 % | RESPIRATION RATE: 12 BRPM | DIASTOLIC BLOOD PRESSURE: 64 MMHG | SYSTOLIC BLOOD PRESSURE: 108 MMHG | TEMPERATURE: 98 F | HEIGHT: 61 IN

## 2020-02-06 DIAGNOSIS — M25.511 PAIN IN JOINT OF RIGHT SHOULDER: ICD-10-CM

## 2020-02-06 PROCEDURE — 99213 OFFICE O/P EST LOW 20 MIN: CPT | Performed by: FAMILY MEDICINE

## 2020-02-06 ASSESSMENT — ENCOUNTER SYMPTOMS
NEUROLOGICAL NEGATIVE: 1
CONSTITUTIONAL NEGATIVE: 1
ARTHRALGIAS: 1

## 2020-02-06 ASSESSMENT — MIFFLIN-ST. JEOR: SCORE: 1440.39

## 2020-02-06 NOTE — PROGRESS NOTES
Subjective     Vikki Medina is a 54 year old female who presents to clinic today for the following health issues:    HPI     General Follow Up    Concern: R shoulder pain  Problem started: follow up from 12/31/2019  Progression of symptoms: better but still having pain    Has been working with PT and is seeing improvement.  Is still having pain across the front of her shoulder.  Is confident that she can continue to recover, so needs an extension on her work restrictions more than any thing else.  Feels is she can have light duty through the end of the month she could then resume full work.         Review of Systems   Constitutional: Negative.    Musculoskeletal: Positive for arthralgias.   Neurological: Negative.             Objective    There were no vitals taken for this visit.  There is no height or weight on file to calculate BMI.  Physical Exam  Vitals signs and nursing note reviewed.   Constitutional:       Appearance: Normal appearance.   Skin:     General: Skin is warm and dry.   Neurological:      General: No focal deficit present.      Mental Status: She is alert and oriented to person, place, and time.       Assessment and Plan    (M25.511) Pain in joint of right shoulder  Comment: slowly recovering, work restrictions extended through the end of the month  Plan:       RTC in 3w prn    Eleno Henley MD

## 2020-02-06 NOTE — LETTER
[address], [address]  [facility] [address]  Phone: [phone]  Fax: [phone]    February 6, 2020        Vikki Medina  [address]  [facility] [address]          To whom it may concern:    RE: Vikki Medina    Patient may return to work with the following:  No lifting/pushing/pulling.  When the patient returns to work, the following restrictions apply until 3/1/20, at which point she may resume work without restrictions.      Please contact me for questions or concerns.      Sincerely,        Eleno Henley MD

## 2020-02-25 ENCOUNTER — TELEPHONE (OUTPATIENT)
Dept: FAMILY MEDICINE | Facility: CLINIC | Age: 55
End: 2020-02-25

## 2020-02-25 NOTE — LETTER
[address], [address]  [facility][1] [address]  Phone: [phone]  Fax: [phone]    February 25, 2020        Vikki Medina  [address]  [facility][1] [address]          To whom it may concern:    RE: Vikki Medina    Patient may return to work 3/2/20 with the following:  No working or lifting restrictions    Please contact me for questions or concerns.      Sincerely,        Tata Belle MD

## 2020-02-25 NOTE — TELEPHONE ENCOUNTER
Reason for call:  Other   Patient called regarding (reason for call): Letter to return to work  Additional comments: Pt requesting a letter for her to return to work on 3/2/2020 without restrictions.      Phone number to reach patient:  Cell number on file:    Telephone Information:   Mobile [phone]       Best Time:  Any     Can we leave a detailed message on this number?  YES    Travel screening: Not Applicable

## 2020-02-26 NOTE — TELEPHONE ENCOUNTER
Called and LVM for patient to return call    - let her know letter placed at  in  for      Lorraine Eckert/

## 2020-03-10 ENCOUNTER — OFFICE VISIT (OUTPATIENT)
Dept: FAMILY MEDICINE | Facility: CLINIC | Age: 55
End: 2020-03-10
Payer: COMMERCIAL

## 2020-03-10 VITALS
RESPIRATION RATE: 16 BRPM | HEART RATE: 78 BPM | BODY MASS INDEX: 36.56 KG/M2 | WEIGHT: 195.1 LBS | SYSTOLIC BLOOD PRESSURE: 112 MMHG | OXYGEN SATURATION: 97 % | DIASTOLIC BLOOD PRESSURE: 70 MMHG | TEMPERATURE: 98 F

## 2020-03-10 DIAGNOSIS — E78.5 HYPERLIPIDEMIA WITH TARGET LDL LESS THAN 100: ICD-10-CM

## 2020-03-10 DIAGNOSIS — E11.65 TYPE 2 DIABETES MELLITUS WITH HYPERGLYCEMIA, WITHOUT LONG-TERM CURRENT USE OF INSULIN (H): ICD-10-CM

## 2020-03-10 DIAGNOSIS — I10 ESSENTIAL HYPERTENSION: ICD-10-CM

## 2020-03-10 LAB — HBA1C MFR BLD: 7.3 % (ref 0–5.6)

## 2020-03-10 PROCEDURE — 82043 UR ALBUMIN QUANTITATIVE: CPT | Performed by: FAMILY MEDICINE

## 2020-03-10 PROCEDURE — 99214 OFFICE O/P EST MOD 30 MIN: CPT | Performed by: FAMILY MEDICINE

## 2020-03-10 PROCEDURE — 36415 COLL VENOUS BLD VENIPUNCTURE: CPT | Performed by: FAMILY MEDICINE

## 2020-03-10 PROCEDURE — 83036 HEMOGLOBIN GLYCOSYLATED A1C: CPT | Performed by: FAMILY MEDICINE

## 2020-03-10 RX ORDER — LISINOPRIL AND HYDROCHLOROTHIAZIDE 12.5; 2 MG/1; MG/1
TABLET ORAL
Qty: 180 TABLET | Refills: 1 | Status: CANCELLED | OUTPATIENT
Start: 2020-03-10

## 2020-03-10 RX ORDER — ATORVASTATIN CALCIUM 20 MG/1
20 TABLET, FILM COATED ORAL DAILY
Qty: 90 TABLET | Refills: 3 | Status: CANCELLED | OUTPATIENT
Start: 2020-03-10

## 2020-03-10 RX ORDER — ROSUVASTATIN CALCIUM 10 MG/1
10 TABLET, COATED ORAL DAILY
Qty: 90 TABLET | Refills: 3 | Status: SHIPPED | OUTPATIENT
Start: 2020-03-10 | End: 2021-02-22 | Stop reason: SINTOL

## 2020-03-10 RX ORDER — METFORMIN HCL 500 MG
1000 TABLET, EXTENDED RELEASE 24 HR ORAL 2 TIMES DAILY WITH MEALS
Qty: 360 TABLET | Refills: 1 | Status: CANCELLED | OUTPATIENT
Start: 2020-03-10

## 2020-03-10 RX ORDER — GLIPIZIDE 10 MG/1
20 TABLET, FILM COATED, EXTENDED RELEASE ORAL DAILY
Qty: 180 TABLET | Refills: 1 | Status: CANCELLED | OUTPATIENT
Start: 2020-03-10

## 2020-03-10 NOTE — NURSING NOTE
"Chief Complaint   Patient presents with     Diabetes     Initial /70 (BP Location: Right arm, Patient Position: Sitting, Cuff Size: Adult Large)   Pulse 78   Temp 98  F (36.7  C) (Oral)   Resp 16   Wt 88.5 kg (195 lb 1.6 oz)   SpO2 97%   BMI 36.56 kg/m   Estimated body mass index is 36.56 kg/m  as calculated from the following:    Height as of 2/6/20: 1.556 m (5' 1.25\").    Weight as of this encounter: 88.5 kg (195 lb 1.6 oz).  BP completed using cuff size large right arm    Lisa Magill, CMA    "

## 2020-03-10 NOTE — PROGRESS NOTES
"Subjective     Vikki Medina is a 54 year old female who presents to clinic today for the following health issues:    HPI   Diabetes Follow-up      How often are you checking your blood sugar? { :056616}    What concerns do you have today about your diabetes? { :912453::\"None\"}     Do you have any of these symptoms? (Select all that apply)  { :656401}    Have you had a diabetic eye exam in the last 12 months? { :210687}        BP Readings from Last 2 Encounters:   02/06/20 108/64   12/31/19 92/56     Hemoglobin A1C (%)   Date Value   10/29/2019 9.9 (H)   04/06/2019 9.6 (H)     LDL Cholesterol Calculated (mg/dL)   Date Value   10/29/2019 82   04/06/2019 88       {Reference  Diabetes Management Resources :779830}    {Reference  Diabetes Log - 7 days :597635}      How many servings of fruits and vegetables do you eat daily?  { :530619}    On average, how many sweetened beverages do you drink each day (Examples: soda, juice, sweet tea, etc.  Do NOT count diet or artificially sweetened beverages)?   { 1-11:648137}    How many days per week do you exercise enough to make your heart beat faster? { :448740}    How many minutes a day do you exercise enough to make your heart beat faster? { :861099}    How many days per week do you miss taking your medication? {0-7 :314617}    {additonal problems for provider to add (Optional):819616}    {HIST REVIEW/ LINKS 2 (Optional):325231}    Reviewed and updated as needed this visit by Provider         Review of Systems   {ROS COMP (Optional):883341}      Objective    There were no vitals taken for this visit.  There is no height or weight on file to calculate BMI.  Physical Exam   {Exam List (Optional):415608}    {Diagnostic Test Results (Optional):966437::\"Diagnostic Test Results:\",\"Labs reviewed in Epic\"}        {PROVIDER CHARTING PREFERENCE:867230}    "

## 2020-03-10 NOTE — PROGRESS NOTES
Subjective     Vikki Medina is a 54 year old female who presents to clinic today for the following health issues:    HPI   Diabetes Follow-up    How often are you checking your blood sugar? Three times daily  Blood sugar testing frequency justification:  Patient modifying lifestyle changes (diet, exercise) with blood sugars  What time of day are you checking your blood sugars (select all that apply)?  Before and after meals and At bedtime  Have you had any blood sugars above 200?  Yes -ONCE   Have you had any blood sugars below 70?  No    What symptoms do you notice when your blood sugar is low?  None    What concerns do you have today about your diabetes? CONCERNS ABOUT MUSCLE PAIN.      Do you have any of these symptoms? (Select all that apply)  No numbness or tingling in feet.  No redness, sores or blisters on feet.  No complaints of excessive thirst.  No reports of blurry vision.  No significant changes to weight.    Have you had a diabetic eye exam in the last 12 months? Yes- Date of last eye exam: 01/29/2020,  Location: [facility] in [place]    Lab Results   Component Value Date    A1C 7.3 03/10/2020    A1C 9.9 10/29/2019    A1C 9.6 04/06/2019    A1C 7.7 10/01/2018    A1C 7.0 02/05/2018     Glipizide dose was increased from 10 mg to 20 mg at LOV. She's checking glucose levels daily. This morning they were 115 mg/dL. Once it went up to 202 mg/dL after drinking a frappe.     Patient has been working very hard to improve her diabetes and avoid insulin. She's cut out cookies, soda, and coffee creamer. She swapped out white rice for brown rice. Notes she no longer makes smoothies at home.    She was slowly losing weight but then injured her shoulder 12/2019 and became less active. She just returned to work last week and plans on resuming her activity levels.     Hyperlipidemia Follow-Up      Are you regularly taking any medication or supplement to lower your cholesterol?   Yes- lipitor    Are you having  muscle aches or other side effects that you think could be caused by your cholesterol lowering medication?  YES    How many servings of fruits and vegetables do you eat daily?  4 or more    On average, how many sweetened beverages do you drink each day (Examples: soda, juice, sweet tea, etc.  Do NOT count diet or artificially sweetened beverages)?   0    How many days per week do you exercise enough to make your heart beat faster? 4    How many minutes a day do you exercise enough to make your heart beat faster? 30 - 60    How many days per week do you miss taking your medication? 0    Recent Labs   Lab Test 10/29/19  1346 04/06/19  0946  10/09/13  0816   CHOL 171 165   < > 186   HDL 53 56   < > 52   LDL 82 88   < > 111   TRIG 181* 106   < > 115   CHOLHDLRATIO  --   --   --  3.6    < > = values in this interval not displayed.     Started having muscle pains after increased dose of atorvastatin (20 mg). She continues taking the higher dose despite the muscle aches. She had no issues or side effects with lower dose of atorvastatin.     Hypertension    BP Readings from Last 3 Encounters:   03/10/20 112/70   02/06/20 108/64   12/31/19 92/56     BP stable with 2 tabs of lisinopril 20 mg+HCTZ 12.5 mg daily.      Recent Labs   Lab Test 03/10/20  0902 10/29/19  1346 04/06/19  0946 10/01/18  0955   A1C 7.3* 9.9* 9.6* 7.7*   LDL  --  82 88 111*   HDL  --  53 56 58   TRIG  --  181* 106 111   ALT  --  28 26 34   CR  --  0.76 0.69 0.48*   GFRESTIMATED  --  >90 >90 >90   GFRESTBLACK  --  >90 >90 >90   POTASSIUM  --  4.0 3.9 3.8   TSH  --   --  1.20 2.51      BP Readings from Last 3 Encounters:   03/10/20 112/70   02/06/20 108/64   12/31/19 92/56    Wt Readings from Last 3 Encounters:   03/10/20 88.5 kg (195 lb 1.6 oz)   02/06/20 88.9 kg (196 lb)   12/31/19 89.4 kg (197 lb)         Reviewed and updated as needed this visit by Provider         Review of Systems   ROS COMP: Constitutional, HEENT, cardiovascular, pulmonary, gi and gu  systems are negative, except as otherwise noted.    This document serves as a record of the services and decisions personally performed and made by Tata Belle MD. It was created on her behalf by Sol Bowen, a trained medical scribe. The creation of this document is based on the provider's statements to the medical scribe.  Sol Bowen March 10, 2020 9:47 AM         Objective    /70 (BP Location: Right arm, Patient Position: Sitting, Cuff Size: Adult Large)   Pulse 78   Temp 98  F (36.7  C) (Oral)   Resp 16   Wt 88.5 kg (195 lb 1.6 oz)   SpO2 97%   BMI 36.56 kg/m    Body mass index is 36.56 kg/m .     Physical Exam   GENERAL: healthy, alert and no distress  HENT: ear canals and TM's normal, nose and mouth without ulcers or lesions  NECK: no adenopathy, no asymmetry, masses, or scars and thyroid normal to palpation  RESP: lungs clear to auscultation - no rales, rhonchi or wheezes  CV: regular rate and rhythm, normal S1 S2, no S3 or S4, no murmur, click or rub, no peripheral edema and peripheral pulses strong  MS: no gross musculoskeletal defects noted, no edema  SKIN: no suspicious lesions or rashes  NEURO: Normal strength and tone, mentation intact and speech normal  PSYCH: mentation appears normal, affect normal/bright  Diabetic foot exam: normal DP and PT pulses, no trophic changes or ulcerative lesions, normal sensory exam and normal monofilament exam    Diagnostic Test Results:  Labs reviewed in Epic  Results for orders placed or performed in visit on 03/10/20 (from the past 24 hour(s))   **A1C FUTURE 3mo   Result Value Ref Range    Hemoglobin A1C 7.3 (H) 0 - 5.6 %           Assessment & Plan     (E11.65) Type 2 diabetes mellitus with hyperglycemia, without long-term current use of insulin (H)  Comment: A1c much improved and now within target range. No med changes, encouraged patient to continue with diet efforts. Consider diabetes educator.  Plan: Albumin Random Urine Quantitative with  Creat         Ratio, rosuvastatin (CRESTOR) 10 MG tablet,         Lipid panel reflex to direct LDL Fasting,         **Comprehensive metabolic panel FUTURE anytime,        **TSH with free T4 reflex FUTURE anytime, **CBC        with platelets FUTURE anytime          (E78.5) Hyperlipidemia with target LDL less than 100  Comment: Switched from atorvastatin to rosuvastatin due to myalgias. Patient will let me know if experiencing any muscle aches or pains. Discussed importance of cholesterol control for stroke prevention with her diabetes.   Plan: rosuvastatin (CRESTOR) 10 MG tablet          (I10) Essential hypertension  Comment: BP at target. Continue current meds.  Plan:      Recommended pneumonia vaccine, patient deferred this today.       FUTURE APPOINTMENTS:       - Follow-up visit in 6 months    There are no Patient Instructions on file for this visit.    The information in this document, created by the medical scribe for me, accurately reflects the services I personally performed and the decisions made by me. I have reviewed and approved this document for accuracy prior to leaving the patient care area.  March 10, 2020 10:11 AM    Tata Belle MD  [facility]

## 2020-03-11 LAB
CREAT UR-MCNC: 131 MG/DL
MICROALBUMIN UR-MCNC: 21 MG/L
MICROALBUMIN/CREAT UR: 15.8 MG/G CR (ref 0–25)

## 2020-03-19 ENCOUNTER — TELEPHONE (OUTPATIENT)
Dept: FAMILY MEDICINE | Facility: CLINIC | Age: 55
End: 2020-03-19

## 2020-03-19 DIAGNOSIS — E78.5 HYPERLIPIDEMIA WITH TARGET LDL LESS THAN 100: Primary | ICD-10-CM

## 2020-03-19 NOTE — TELEPHONE ENCOUNTER
Spoke with patient and she stopped this 3 days ago and is feeling much better.  She is scared to take a 1/2 tablet as she was unable to sleep and the muscle pain was so bad.    Please advise    Malika Joseph RN, BSN

## 2020-03-19 NOTE — TELEPHONE ENCOUNTER
Lets have her break it in half for a few days and let me know if that works better. If that does not help, we can change the medication entirely

## 2020-03-19 NOTE — TELEPHONE ENCOUNTER
03/18/2020      General Call:   Who is calling:  Jim Florez   Reason for Call:  Pt states that rosuvastatin (CRESTOR) 10 MG tablet  What are your questions or concerns: Pt states she is getting the same side effects- muscle pain. Wants to know what to do next?  Date of last appointment with provider: 03/10/2020  Okay to leave a detailed message:Yes at Home number on file [phone] (home)     Margie Wei Patient Representative

## 2020-03-19 NOTE — TELEPHONE ENCOUNTER
3/19/2020  Patient returned call gave the message below, patient has mor question please call patient back. She is concern that break the medication in half may not work.  Ok to leave message

## 2020-03-20 RX ORDER — PRAVASTATIN SODIUM 20 MG
20 TABLET ORAL DAILY
Qty: 90 TABLET | Refills: 3 | Status: SHIPPED | OUTPATIENT
Start: 2020-03-20 | End: 2021-02-22 | Stop reason: SINTOL

## 2020-03-20 NOTE — TELEPHONE ENCOUNTER
Gave patient the message, she will give us a update 6-8 weeks. She had no questions for a RN.  Rhoda Spencer

## 2020-03-20 NOTE — TELEPHONE ENCOUNTER
Pravastatin has the least side effects. It is also the weakest. I have ordered the 20mg, but she can start for the first few weeks at the 10mg.  Let me know how she is doing. Thank you

## 2020-04-13 ENCOUNTER — TELEPHONE (OUTPATIENT)
Dept: FAMILY MEDICINE | Facility: CLINIC | Age: 55
End: 2020-04-13

## 2020-04-13 NOTE — TELEPHONE ENCOUNTER
Reason for Call:  Form, our goal is to have forms completed with 72 hours, however, some forms may require a visit or additional information.    Type of letter, form or note:  worker's compensation    Who is the form from?: Insurance comp    Where did the form come from: form was faxed in    What clinic location was the form placed at?: [facility]     Where the form was placed: Dr Henley Box/Folder    What number is listed as a contact on the form?: [phone]       Additional comments: Please fill out and fax to [phone]    Call taken on 4/13/2020 at 10:44 AM by Lorraine Eckert

## 2020-05-14 ENCOUNTER — TELEPHONE (OUTPATIENT)
Dept: FAMILY MEDICINE | Facility: CLINIC | Age: 55
End: 2020-05-14

## 2020-05-14 NOTE — TELEPHONE ENCOUNTER
Forms/Letter Request  Name of form/letter: [place] unemployment Ins  Have you been seen for this request: No  Do we have the form/letter: Yes: Given to Ankur NELSON  When is form/letter needed by: Soon  How would you like the form/letter returned:   Patient Notified form requests are processed in 3-5 business days:Yes  Okay to leave a detailed message? Yes Home number on file [phone] (home)

## 2020-05-19 NOTE — TELEPHONE ENCOUNTER
Patient called back asking about form, talked with her, and states that she believes she selected the wrong button when applying for Unemployment, due to that they need to know why    Patient stated that she was unemployed on May 1st, but previously had restrictions and disability, but from the last note, patient was cleared of all restrictions starting March 1st and able to return, just need to fill out that patient is okay to work, if applies for job, with no restrictions. I have checked the back page     _ Fully able to work , without restrictions starting 3/1/20, not sure if there needs to be a through date, since its ongoing    I believe the front would need to be filled out as well just so they know condition as come and gone.      Please complete the front 4 questions and sign back page to fax    Lorraine Eckert/

## 2020-05-19 NOTE — TELEPHONE ENCOUNTER
Please call . Is she really to return to work? I have not seen her for this issue, but was seen by Dr. Henley

## 2020-11-02 DIAGNOSIS — I10 ESSENTIAL HYPERTENSION: ICD-10-CM

## 2020-11-02 DIAGNOSIS — E11.65 TYPE 2 DIABETES MELLITUS WITH HYPERGLYCEMIA, WITHOUT LONG-TERM CURRENT USE OF INSULIN (H): ICD-10-CM

## 2020-11-02 NOTE — LETTER
[facility]  [address] [id number] [phone]          November 5, 2020    Vikki Medina                                                                                                                     [address] [place] [address]            Dear Vikki,  We have made several attempts to contact you.  You will need to call the clinic at [phone] to schedule an appointment prior to any further refills of your medication.        Sincerely,         Tata Belle MD

## 2020-11-03 RX ORDER — LISINOPRIL AND HYDROCHLOROTHIAZIDE 12.5; 2 MG/1; MG/1
TABLET ORAL
Qty: 180 TABLET | Refills: 0 | Status: SHIPPED | OUTPATIENT
Start: 2020-11-03 | End: 2021-02-22

## 2020-11-03 RX ORDER — GLIPIZIDE 10 MG/1
TABLET, FILM COATED, EXTENDED RELEASE ORAL
Qty: 180 TABLET | Refills: 0 | Status: SHIPPED | OUTPATIENT
Start: 2020-11-03 | End: 2021-02-22

## 2020-11-03 NOTE — TELEPHONE ENCOUNTER
Medication is being filled for 1 time refill only due to:  Patient needs to be seen because needs appt and labs.    Please call to schedule  Malika Joseph RN, BSN

## 2021-01-14 ENCOUNTER — HEALTH MAINTENANCE LETTER (OUTPATIENT)
Age: 56
End: 2021-01-14

## 2021-01-18 ENCOUNTER — TRANSFERRED RECORDS (OUTPATIENT)
Dept: HEALTH INFORMATION MANAGEMENT | Facility: CLINIC | Age: 56
End: 2021-01-18

## 2021-01-18 LAB — RETINOPATHY: NEGATIVE

## 2021-02-22 ENCOUNTER — OFFICE VISIT (OUTPATIENT)
Dept: FAMILY MEDICINE | Facility: CLINIC | Age: 56
End: 2021-02-22
Payer: COMMERCIAL

## 2021-02-22 ENCOUNTER — ANCILLARY PROCEDURE (OUTPATIENT)
Dept: MAMMOGRAPHY | Facility: CLINIC | Age: 56
End: 2021-02-22
Payer: COMMERCIAL

## 2021-02-22 VITALS
HEART RATE: 96 BPM | WEIGHT: 201 LBS | SYSTOLIC BLOOD PRESSURE: 124 MMHG | DIASTOLIC BLOOD PRESSURE: 80 MMHG | BODY MASS INDEX: 37.95 KG/M2 | RESPIRATION RATE: 12 BRPM | HEIGHT: 61 IN | TEMPERATURE: 98.4 F | OXYGEN SATURATION: 98 %

## 2021-02-22 DIAGNOSIS — E78.5 HYPERLIPIDEMIA WITH TARGET LDL LESS THAN 100: ICD-10-CM

## 2021-02-22 DIAGNOSIS — Z11.4 SCREENING FOR HIV (HUMAN IMMUNODEFICIENCY VIRUS): ICD-10-CM

## 2021-02-22 DIAGNOSIS — Z11.59 NEED FOR HEPATITIS C SCREENING TEST: ICD-10-CM

## 2021-02-22 DIAGNOSIS — Z12.31 ENCOUNTER FOR SCREENING MAMMOGRAM FOR BREAST CANCER: ICD-10-CM

## 2021-02-22 DIAGNOSIS — I10 ESSENTIAL HYPERTENSION: ICD-10-CM

## 2021-02-22 DIAGNOSIS — Z23 NEED FOR PROPHYLACTIC VACCINATION AND INOCULATION AGAINST INFLUENZA: ICD-10-CM

## 2021-02-22 DIAGNOSIS — E55.9 VITAMIN D DEFICIENCY: ICD-10-CM

## 2021-02-22 DIAGNOSIS — Z00.00 ROUTINE GENERAL MEDICAL EXAMINATION AT A HEALTH CARE FACILITY: Primary | ICD-10-CM

## 2021-02-22 DIAGNOSIS — Z23 NEED FOR VACCINATION: ICD-10-CM

## 2021-02-22 DIAGNOSIS — H61.22 IMPACTED CERUMEN OF LEFT EAR: ICD-10-CM

## 2021-02-22 DIAGNOSIS — E11.9 TYPE 2 DIABETES MELLITUS WITHOUT COMPLICATION, WITHOUT LONG-TERM CURRENT USE OF INSULIN (H): ICD-10-CM

## 2021-02-22 PROBLEM — Z87.19 H/O HEMORRHOIDS: Status: RESOLVED | Noted: 2021-02-22 | Resolved: 2021-02-22

## 2021-02-22 PROBLEM — K64.2 THIRD DEGREE HEMORRHOIDS: Status: RESOLVED | Noted: 2021-02-22 | Resolved: 2021-02-22

## 2021-02-22 PROBLEM — Z87.19 H/O HEMORRHOIDS: Status: ACTIVE | Noted: 2021-02-22

## 2021-02-22 PROBLEM — M25.511 PAIN IN JOINT OF RIGHT SHOULDER: Status: RESOLVED | Noted: 2020-01-23 | Resolved: 2021-02-22

## 2021-02-22 PROBLEM — K64.2 THIRD DEGREE HEMORRHOIDS: Status: ACTIVE | Noted: 2021-02-22

## 2021-02-22 PROCEDURE — 69209 REMOVE IMPACTED EAR WAX UNI: CPT | Performed by: FAMILY MEDICINE

## 2021-02-22 PROCEDURE — 90471 IMMUNIZATION ADMIN: CPT | Performed by: FAMILY MEDICINE

## 2021-02-22 PROCEDURE — 90682 RIV4 VACC RECOMBINANT DNA IM: CPT | Performed by: FAMILY MEDICINE

## 2021-02-22 PROCEDURE — 90715 TDAP VACCINE 7 YRS/> IM: CPT | Performed by: FAMILY MEDICINE

## 2021-02-22 PROCEDURE — 99396 PREV VISIT EST AGE 40-64: CPT | Mod: 25 | Performed by: FAMILY MEDICINE

## 2021-02-22 PROCEDURE — 99214 OFFICE O/P EST MOD 30 MIN: CPT | Mod: 25 | Performed by: FAMILY MEDICINE

## 2021-02-22 PROCEDURE — 90472 IMMUNIZATION ADMIN EACH ADD: CPT | Performed by: FAMILY MEDICINE

## 2021-02-22 PROCEDURE — 77067 SCR MAMMO BI INCL CAD: CPT | Mod: TC | Performed by: RADIOLOGY

## 2021-02-22 RX ORDER — ATORVASTATIN CALCIUM 20 MG/1
20 TABLET, FILM COATED ORAL DAILY
Qty: 90 TABLET | Refills: 0 | Status: SHIPPED | OUTPATIENT
Start: 2021-02-22 | End: 2021-05-20

## 2021-02-22 RX ORDER — METFORMIN HCL 500 MG
TABLET, EXTENDED RELEASE 24 HR ORAL
Qty: 360 TABLET | Refills: 0 | Status: SHIPPED | OUTPATIENT
Start: 2021-02-22 | End: 2021-05-20

## 2021-02-22 RX ORDER — GLIPIZIDE 10 MG/1
TABLET, FILM COATED, EXTENDED RELEASE ORAL
Qty: 180 TABLET | Refills: 0 | Status: SHIPPED | OUTPATIENT
Start: 2021-02-22 | End: 2021-06-24

## 2021-02-22 RX ORDER — LISINOPRIL AND HYDROCHLOROTHIAZIDE 12.5; 2 MG/1; MG/1
2 TABLET ORAL DAILY
Qty: 180 TABLET | Refills: 1 | Status: SHIPPED | OUTPATIENT
Start: 2021-02-22 | End: 2021-07-05

## 2021-02-22 ASSESSMENT — ENCOUNTER SYMPTOMS
ABDOMINAL PAIN: 0
CONSTIPATION: 0
PARESTHESIAS: 0
FEVER: 0
PALPITATIONS: 0
COUGH: 0
DIARRHEA: 0
BREAST MASS: 0
HEARTBURN: 0
SORE THROAT: 0
HEMATOCHEZIA: 0
WEAKNESS: 0
MYALGIAS: 0
DYSURIA: 0
ARTHRALGIAS: 0
DIZZINESS: 0
CHILLS: 0
NERVOUS/ANXIOUS: 0
JOINT SWELLING: 0
FREQUENCY: 0
HEADACHES: 0
HEMATURIA: 0
NAUSEA: 0
SHORTNESS OF BREATH: 0

## 2021-02-22 ASSESSMENT — MIFFLIN-ST. JEOR: SCORE: 1458.07

## 2021-02-22 NOTE — PATIENT INSTRUCTIONS
Immunizations for next year:  Hepatitis B Series, Pneumonia Vaccine, Shingles Vaccine      Preventive Health Recommendations  Female Ages 50 - 64    Yearly exam: See your health care provider every year in order to  o Review health changes.   o Discuss preventive care.    o Review your medicines if your doctor has prescribed any.      Get a Pap test every three years (unless you have an abnormal result and your provider advises testing more often).    If you get Pap tests with HPV test, you only need to test every 5 years, unless you have an abnormal result.     You do not need a Pap test if your uterus was removed (hysterectomy) and you have not had cancer.    You should be tested each year for STDs (sexually transmitted diseases) if you're at risk.     Have a mammogram every 1 to 2 years.    Have a colonoscopy at age 50, or have a yearly FIT test (stool test). These exams screen for colon cancer.      Have a cholesterol test every 5 years, or more often if advised.    Have a diabetes test (fasting glucose) every three years. If you are at risk for diabetes, you should have this test more often.     If you are at risk for osteoporosis (brittle bone disease), think about having a bone density scan (DEXA).    Shots: Get a flu shot each year. Get a tetanus shot every 10 years.    Nutrition:     Eat at least 5 servings of fruits and vegetables each day.    Eat whole-grain bread, whole-wheat pasta and brown rice instead of white grains and rice.    Get adequate Calcium and Vitamin D.     Lifestyle    Exercise at least 150 minutes a week (30 minutes a day, 5 days a week). This will help you control your weight and prevent disease.    Limit alcohol to one drink per day.    No smoking.     Wear sunscreen to prevent skin cancer.     See your dentist every six months for an exam and cleaning.    See your eye doctor every 1 to 2 years.

## 2021-02-22 NOTE — PROGRESS NOTES
SUBJECTIVE:   CC: Vikki Medina is an 55 year old woman who presents for preventive health visit.     1.  Here for physical, needs to get labs.    2.  Left ear feels like she is in the water.  Was painful, but pain resolved.  Hearing loss- none.  Some discharge, resolved.    3.  Had her Diabetic Eye exam - January 2021, [facility] in [place]    4.  Needs med refills    Patient has been advised of split billing requirements and indicates understanding: Yes  Healthy Habits:     Getting at least 3 servings of Calcium per day:  Yes    Bi-annual eye exam:  Yes    Dental care twice a year:  Yes    Sleep apnea or symptoms of sleep apnea:  Excessive snoring    Diet:  Low fat/cholesterol and Diabetic    Frequency of exercise:  4-5 days/week    Duration of exercise:  30-45 minutes    Taking medications regularly:  Yes    Medication side effects:  None    PHQ-2 Total Score: 0    Additional concerns today:  Yes      Today's PHQ-2 Score:   PHQ-2 ( 1999 Pfizer) 2/22/2021   Q1: Little interest or pleasure in doing things 0   Q2: Feeling down, depressed or hopeless 0   PHQ-2 Score 0   Q1: Little interest or pleasure in doing things Not at all   Q2: Feeling down, depressed or hopeless Not at all   PHQ-2 Score 0       Abuse: Current or Past (Physical, Sexual or Emotional) - No  Do you feel safe in your environment? Yes    Have you ever done Advance Care Planning? (For example, a Health Directive, POLST, or a discussion with a medical provider or your loved ones about your wishes): No, advance care planning information given to patient to review.  Patient declined advance care planning discussion at this time.    Social History     Tobacco Use     Smoking status: Former Smoker     Types: Cigarettes     Quit date: 1/22/2018     Years since quitting: 3.0     Smokeless tobacco: Never Used     Tobacco comment: 4-5 per day   Substance Use Topics     Alcohol use: No     Alcohol/week: 0.0 standard drinks       Alcohol Use 2/22/2021    Prescreen: >3 drinks/day or >7 drinks/week? Not Applicable   Prescreen: >3 drinks/day or >7 drinks/week? -       Reviewed orders with patient.  Reviewed health maintenance and updated orders accordingly - Yes  Lab work is in process  Labs reviewed in EPIC  BP Readings from Last 3 Encounters:   02/22/21 124/80   03/10/20 112/70   02/06/20 108/64    Wt Readings from Last 3 Encounters:   02/22/21 91.2 kg (201 lb)   03/10/20 88.5 kg (195 lb 1.6 oz)   02/06/20 88.9 kg (196 lb)                  Patient Active Problem List   Diagnosis     Vitamin D deficiency     CARDIOVASCULAR SCREENING; LDL GOAL LESS THAN 160     S/P colonoscopy with polypectomy     Adenomatous colon polyp     Type 2 diabetes mellitus with hyperglycemia, without long-term current use of insulin (H)     Morbid obesity (H)     Hyperlipidemia with target LDL less than 100     Past Surgical History:   Procedure Laterality Date     COLONOSCOPY N/A 6/3/2019    Procedure: COLONOSCOPY;  Surgeon: Javi Joseph MD;  Location:  GI     left arm benign tumor removed  1995     TUBAL LIGATION  2005       Social History     Tobacco Use     Smoking status: Former Smoker     Types: Cigarettes     Quit date: 1/22/2018     Years since quitting: 3.0     Smokeless tobacco: Never Used     Tobacco comment: 4-5 per day   Substance Use Topics     Alcohol use: No     Alcohol/week: 0.0 standard drinks     Family History   Problem Relation Age of Onset     Hypertension Father      Family History Negative Mother          Current Outpatient Medications   Medication Sig Dispense Refill     aspirin (ASA) 81 MG EC tablet TAKE 1 TABLET (81 MG) BY MOUTH DAILY 90 tablet 3     atorvastatin (LIPITOR) 20 MG tablet Take 1 tablet (20 mg) by mouth daily 90 tablet 0     glipiZIDE (GLUCOTROL XL) 10 MG 24 hr tablet TAKE 2 TABLETS BY MOUTH EVERY  tablet 0     lisinopril-hydrochlorothiazide (ZESTORETIC) 20-12.5 MG tablet Take 2 tablets by mouth daily 180 tablet 1     metFORMIN  (GLUCOPHAGE-XR) 500 MG 24 hr tablet TAKE 2 TABLETS BY MOUTH 2 TIMES DAILY WITH MEALS 360 tablet 0     Allergies   Allergen Reactions     Sulfa Drugs      Recent Labs   Lab Test 03/10/20  0902 10/29/19  1346 19  0946 10/01/18  0955   A1C 7.3* 9.9* 9.6* 7.7*   LDL  --  82 88 111*   HDL  --  53 56 58   TRIG  --  181* 106 111   ALT  --  28 26 34   CR  --  0.76 0.69 0.48*   GFRESTIMATED  --  >90 >90 >90   GFRESTBLACK  --  >90 >90 >90   POTASSIUM  --  4.0 3.9 3.8   TSH  --   --  1.20 2.51        Breast CA Risk Screening:  No flowsheet data found.      Mammogram Screening: Recommended annual mammography  Pertinent mammograms are reviewed under the imaging tab.    History of abnormal Pap smear: NO - age 30-65 PAP every 5 years with negative HPV co-testing recommended  PAP / HPV Latest Ref Rng & Units 10/29/2019 12/15/2016 10/3/2013   PAP - NIL NIL NIL   HPV 16 DNA NEG:Negative Negative - -   HPV 18 DNA NEG:Negative Negative - -   OTHER HR HPV NEG:Negative Negative - -     Reviewed and updated as needed this visit by clinical staff  Tobacco  Allergies  Meds  Problems  Med Hx  Surg Hx  Fam Hx  Soc Hx          Reviewed and updated as needed this visit by Provider  Tobacco  Allergies  Meds  Problems  Med Hx  Surg Hx  Fam Hx  Soc Hx         Past Medical History:   Diagnosis Date     Delivery normal     , no complications     Third degree hemorrhoids 2021      Past Surgical History:   Procedure Laterality Date     COLONOSCOPY N/A 6/3/2019    Procedure: COLONOSCOPY;  Surgeon: Javi Joseph MD;  Location:  GI     left arm benign tumor removed       TUBAL LIGATION         Review of Systems   Constitutional: Negative for chills and fever.   HENT: Positive for ear pain. Negative for congestion, hearing loss and sore throat.    Eyes: Negative for visual disturbance.   Respiratory: Negative for cough and shortness of breath.    Cardiovascular: Negative for chest pain, palpitations and  "peripheral edema.   Gastrointestinal: Negative for abdominal pain, constipation, diarrhea, heartburn, hematochezia and nausea.   Breasts:  Negative for tenderness, breast mass and discharge.   Genitourinary: Negative for dysuria, frequency, genital sores, hematuria, pelvic pain, urgency, vaginal bleeding and vaginal discharge.   Musculoskeletal: Negative for arthralgias, joint swelling and myalgias.   Skin: Negative for rash.   Neurological: Negative for dizziness, weakness, headaches and paresthesias.   Psychiatric/Behavioral: Negative for mood changes. The patient is not nervous/anxious.           OBJECTIVE:   /80 (BP Location: Right arm, Patient Position: Chair, Cuff Size: Adult Regular)   Pulse 96   Temp 98.4  F (36.9  C) (Oral)   Resp 12   Ht 1.556 m (5' 1.25\")   Wt 91.2 kg (201 lb)   SpO2 98%   BMI 37.67 kg/m    Physical Exam  GENERAL: healthy, alert and no distress  EYES: Eyes grossly normal to inspection, PERRL and conjunctivae and sclerae normal  HENT: normal cephalic/atraumatic, both ears: occluded with wax, nose and mouth without ulcers or lesions, oropharynx clear and oral mucous membranes moist  NECK: no adenopathy, no asymmetry, masses, or scars and thyroid normal to palpation  RESP: lungs clear to auscultation - no rales, rhonchi or wheezes  BREAST: normal without masses, tenderness or nipple discharge and no palpable axillary masses or adenopathy  CV: regular rate and rhythm, normal S1 S2, no S3 or S4, no murmur, click or rub, no peripheral edema and peripheral pulses strong  ABDOMEN: soft, nontender, no hepatosplenomegaly, no masses and bowel sounds normal  MS: no gross musculoskeletal defects noted, no edema  SKIN: no suspicious lesions or rashes  NEURO: Normal strength and tone, mentation intact and speech normal  PSYCH: mentation appears normal, affect normal/bright    Diagnostic Test Results:  Labs reviewed in Epic    ASSESSMENT/PLAN:   1. Routine general medical examination at a " health care facility  Exam completed today, routine health maintenance items updated as able.  Declined Shingles and Hepatitis B vaccines today, will get influenza and Tdap today.  Labs ordered.  Follow up one year for health maintenance visit.  - Lipid panel reflex to direct LDL Fasting; Future  - **Comprehensive metabolic panel FUTURE anytime; Future  - **CBC with platelets FUTURE anytime; Future  - **TSH with free T4 reflex FUTURE anytime; Future    2. Impacted cerumen of left ear  - [place] REMOVAL IMPACTED CERUMEN IRRIGATION/LVG UNILAT    3. Type 2 diabetes mellitus without complication, without long-term current use of insulin (H)  Refill meds for 90 days, will make adjustments if needed pending results.  Follow up in 6 months or sooner as needed.  - aspirin (ASA) 81 MG EC tablet; TAKE 1 TABLET (81 MG) BY MOUTH DAILY  Dispense: 90 tablet; Refill: 3  - **A1C FUTURE anytime; Future  - Albumin Random Urine Quantitative with Creat Ratio; Future  - glipiZIDE (GLUCOTROL XL) 10 MG 24 hr tablet; TAKE 2 TABLETS BY MOUTH EVERY DAY  Dispense: 180 tablet; Refill: 0  - metFORMIN (GLUCOPHAGE-XR) 500 MG 24 hr tablet; TAKE 2 TABLETS BY MOUTH 2 TIMES DAILY WITH MEALS  Dispense: 360 tablet; Refill: 0    4. Hyperlipidemia with target LDL less than 100  Due for labs, refill for 90 days, refill longer pending results.  - atorvastatin (LIPITOR) 20 MG tablet; Take 1 tablet (20 mg) by mouth daily  Dispense: 90 tablet; Refill: 0    5. Essential hypertension  Well controlled, continue current medications.  Follow up in 6-12 months or sooner as needed.  - lisinopril-hydrochlorothiazide (ZESTORETIC) 20-12.5 MG tablet; Take 2 tablets by mouth daily  Dispense: 180 tablet; Refill: 1    6. Vitamin D deficiency  Due for labs  - **Vitamin D Deficiency FUTURE anytime; Future    7. Screening for HIV (human immunodeficiency virus)  - **HIV Antigen Antibody Combo FUTURE anytime; Future    8. Need for hepatitis C screening test  - **Hepatitis C Screen  "Reflex to RNA FUTURE anytime; Future    9. Encounter for screening mammogram for breast cancer  - *MA Screening Digital Bilateral; Future    10. Need for vaccination  - TDAP VACCINE (Adacel, Boostrix)  [[id number]]    11. Need for prophylactic vaccination and inoculation against influenza  - INFLUENZA QUAD, RECOMBINANT, P-FREE (RIV4) (FLUBLOCK) [87617]    Patient has been advised of split billing requirements and indicates understanding: Yes  COUNSELING:  Reviewed preventive health counseling, as reflected in patient instructions    Estimated body mass index is 37.67 kg/m  as calculated from the following:    Height as of this encounter: 1.556 m (5' 1.25\").    Weight as of this encounter: 91.2 kg (201 lb).    Weight management plan: Discussed healthy diet and exercise guidelines    She reports that she quit smoking about 3 years ago. Her smoking use included cigarettes. She has never used smokeless tobacco.      Counseling Resources:  ATP IV Guidelines  Pooled Cohorts Equation Calculator  Breast Cancer Risk Calculator  BRCA-Related Cancer Risk Assessment: FHS-7 Tool  FRAX Risk Assessment  ICSI Preventive Guidelines  Dietary Guidelines for Americans, 2010  USDA's MyPlate  ASA Prophylaxis  Lung CA Screening    April J. Kem Bautista MD  [facility]  "

## 2021-03-04 DIAGNOSIS — E78.5 HYPERLIPIDEMIA WITH TARGET LDL LESS THAN 100: ICD-10-CM

## 2021-03-05 RX ORDER — PRAVASTATIN SODIUM 20 MG
TABLET ORAL
Qty: 90 TABLET | Refills: 3 | OUTPATIENT
Start: 2021-03-05

## 2021-03-05 NOTE — TELEPHONE ENCOUNTER
discontinued on 2/22/2021 by Trixie Jones MD for the following reason: Side effects.    Taylor Zapata RN

## 2021-03-31 ENCOUNTER — IMMUNIZATION (OUTPATIENT)
Dept: NURSING | Facility: CLINIC | Age: 56
End: 2021-03-31
Payer: COMMERCIAL

## 2021-03-31 PROCEDURE — 91300 PR COVID VAC PFIZER DIL RECON 30 MCG/0.3 ML IM: CPT

## 2021-03-31 PROCEDURE — 0001A PR COVID VAC PFIZER DIL RECON 30 MCG/0.3 ML IM: CPT

## 2021-04-21 ENCOUNTER — IMMUNIZATION (OUTPATIENT)
Dept: NURSING | Facility: CLINIC | Age: 56
End: 2021-04-21
Attending: INTERNAL MEDICINE
Payer: COMMERCIAL

## 2021-04-21 PROCEDURE — 0002A PR COVID VAC PFIZER DIL RECON 30 MCG/0.3 ML IM: CPT

## 2021-04-21 PROCEDURE — 91300 PR COVID VAC PFIZER DIL RECON 30 MCG/0.3 ML IM: CPT

## 2021-05-09 ENCOUNTER — HEALTH MAINTENANCE LETTER (OUTPATIENT)
Age: 56
End: 2021-05-09

## 2021-05-20 ENCOUNTER — TELEPHONE (OUTPATIENT)
Dept: FAMILY MEDICINE | Facility: CLINIC | Age: 56
End: 2021-05-20

## 2021-05-20 NOTE — TELEPHONE ENCOUNTER
Summary:    Patient is due/failing the following:   Eye exam    Reviewed:  [x] CARE EVERYWHERE  [x] LAST OV NOTE INCLUDING ENDO  [x] FYI TAB  [x] MYCHART ACTIVE?  [x] LAST PANEL ENCOUNTER  [x] FUTURE APPTS  [x] IMMUNIZATIONS  Located in: [facility]  Address: [address], [place] [address]  Hours:   Open ? Closes 8PM  Phone: [phone]  Called [facility] to have them fax over report      Action needed:   Patient needs office visit for eye exam.    Type of outreach:    per chart, had eye exam 01/2021                                                                               Kimmy Pereyra/PATTI  [facility]---[facility]

## 2021-06-03 NOTE — TELEPHONE ENCOUNTER
Received report from [facility] and signed of by provider, negative for diabetic retinopathy and sent to abstraction    Kimmy Pereyra/[facility]---[facility]

## 2021-06-15 NOTE — TELEPHONE ENCOUNTER
Pt currently passing eye exam per quality    Kimmy Pereyra/[facility]  [place]---[facility]

## 2021-06-21 DIAGNOSIS — I10 ESSENTIAL HYPERTENSION: ICD-10-CM

## 2021-06-21 DIAGNOSIS — E11.9 TYPE 2 DIABETES MELLITUS WITHOUT COMPLICATION, WITHOUT LONG-TERM CURRENT USE OF INSULIN (H): ICD-10-CM

## 2021-06-21 NOTE — TELEPHONE ENCOUNTER
Routing refill request to provider for review/approval because:  Labs not current:  A1C (3/2020) and CR (10/2019)    MAINE LamarN, RN  [facility]

## 2021-06-22 RX ORDER — LISINOPRIL AND HYDROCHLOROTHIAZIDE 12.5; 2 MG/1; MG/1
TABLET ORAL
Qty: 180 TABLET | Refills: 1 | OUTPATIENT
Start: 2021-06-22

## 2021-06-22 RX ORDER — GLIPIZIDE 10 MG/1
TABLET, FILM COATED, EXTENDED RELEASE ORAL
Qty: 180 TABLET | Refills: 0 | OUTPATIENT
Start: 2021-06-22

## 2021-07-05 ENCOUNTER — OFFICE VISIT (OUTPATIENT)
Dept: FAMILY MEDICINE | Facility: CLINIC | Age: 56
End: 2021-07-05
Payer: COMMERCIAL

## 2021-07-05 VITALS
OXYGEN SATURATION: 97 % | DIASTOLIC BLOOD PRESSURE: 78 MMHG | SYSTOLIC BLOOD PRESSURE: 108 MMHG | TEMPERATURE: 98.3 F | RESPIRATION RATE: 16 BRPM | HEART RATE: 76 BPM | WEIGHT: 197.1 LBS | BODY MASS INDEX: 36.27 KG/M2 | HEIGHT: 62 IN

## 2021-07-05 DIAGNOSIS — E78.5 HYPERLIPIDEMIA WITH TARGET LDL LESS THAN 100: ICD-10-CM

## 2021-07-05 DIAGNOSIS — E11.9 TYPE 2 DIABETES MELLITUS WITHOUT COMPLICATION, WITHOUT LONG-TERM CURRENT USE OF INSULIN (H): Primary | ICD-10-CM

## 2021-07-05 DIAGNOSIS — I10 ESSENTIAL HYPERTENSION: ICD-10-CM

## 2021-07-05 DIAGNOSIS — E55.9 VITAMIN D DEFICIENCY: ICD-10-CM

## 2021-07-05 LAB
BASOPHILS # BLD AUTO: 0 10E9/L (ref 0–0.2)
BASOPHILS NFR BLD AUTO: 0.4 %
DEPRECATED CALCIDIOL+CALCIFEROL SERPL-MC: 25 UG/L (ref 20–75)
DIFFERENTIAL METHOD BLD: NORMAL
EOSINOPHIL # BLD AUTO: 0.7 10E9/L (ref 0–0.7)
EOSINOPHIL NFR BLD AUTO: 8.2 %
ERYTHROCYTE [DISTWIDTH] IN BLOOD BY AUTOMATED COUNT: 12.8 % (ref 10–15)
HBA1C MFR BLD: 8.6 % (ref 0–5.6)
HCT VFR BLD AUTO: 39.7 % (ref 35–47)
HGB BLD-MCNC: 13.2 G/DL (ref 11.7–15.7)
LYMPHOCYTES # BLD AUTO: 2.8 10E9/L (ref 0.8–5.3)
LYMPHOCYTES NFR BLD AUTO: 33.8 %
MCH RBC QN AUTO: 29.3 PG (ref 26.5–33)
MCHC RBC AUTO-ENTMCNC: 33.2 G/DL (ref 31.5–36.5)
MCV RBC AUTO: 88 FL (ref 78–100)
MONOCYTES # BLD AUTO: 0.4 10E9/L (ref 0–1.3)
MONOCYTES NFR BLD AUTO: 5.3 %
NEUTROPHILS # BLD AUTO: 4.3 10E9/L (ref 1.6–8.3)
NEUTROPHILS NFR BLD AUTO: 52.3 %
PLATELET # BLD AUTO: 251 10E9/L (ref 150–450)
RBC # BLD AUTO: 4.5 10E12/L (ref 3.8–5.2)
WBC # BLD AUTO: 8.2 10E9/L (ref 4–11)

## 2021-07-05 PROCEDURE — 36415 COLL VENOUS BLD VENIPUNCTURE: CPT | Performed by: PHYSICIAN ASSISTANT

## 2021-07-05 PROCEDURE — 99214 OFFICE O/P EST MOD 30 MIN: CPT | Performed by: PHYSICIAN ASSISTANT

## 2021-07-05 PROCEDURE — 82043 UR ALBUMIN QUANTITATIVE: CPT | Performed by: PHYSICIAN ASSISTANT

## 2021-07-05 PROCEDURE — 80061 LIPID PANEL: CPT | Performed by: PHYSICIAN ASSISTANT

## 2021-07-05 PROCEDURE — 83036 HEMOGLOBIN GLYCOSYLATED A1C: CPT | Performed by: PHYSICIAN ASSISTANT

## 2021-07-05 PROCEDURE — 99207 PR FOOT EXAM NO CHARGE: CPT | Mod: 25 | Performed by: PHYSICIAN ASSISTANT

## 2021-07-05 PROCEDURE — 80050 GENERAL HEALTH PANEL: CPT | Performed by: PHYSICIAN ASSISTANT

## 2021-07-05 PROCEDURE — 82306 VITAMIN D 25 HYDROXY: CPT | Performed by: PHYSICIAN ASSISTANT

## 2021-07-05 RX ORDER — METFORMIN HCL 500 MG
1000 TABLET, EXTENDED RELEASE 24 HR ORAL 2 TIMES DAILY WITH MEALS
Qty: 360 TABLET | Refills: 1 | Status: SHIPPED | OUTPATIENT
Start: 2021-07-05 | End: 2021-12-21

## 2021-07-05 RX ORDER — GLIPIZIDE 10 MG/1
TABLET, FILM COATED, EXTENDED RELEASE ORAL
Qty: 180 TABLET | Refills: 1 | Status: SHIPPED | OUTPATIENT
Start: 2021-07-05 | End: 2021-12-21

## 2021-07-05 RX ORDER — LISINOPRIL AND HYDROCHLOROTHIAZIDE 12.5; 2 MG/1; MG/1
2 TABLET ORAL DAILY
Qty: 180 TABLET | Refills: 1 | Status: SHIPPED | OUTPATIENT
Start: 2021-07-05 | End: 2021-12-21

## 2021-07-05 RX ORDER — PRAVASTATIN SODIUM 20 MG
20 TABLET ORAL DAILY
Qty: 90 TABLET | Refills: 1 | Status: SHIPPED | OUTPATIENT
Start: 2021-07-05 | End: 2021-12-21

## 2021-07-05 ASSESSMENT — MIFFLIN-ST. JEOR: SCORE: 1447.29

## 2021-07-05 NOTE — PROGRESS NOTES
Assessment & Plan     Type 2 diabetes mellitus without complication, without long-term current use of insulin (H)    Continue current medications at this time. Await labs.    - Hemoglobin A1c  - Albumin Random Urine Quantitative with Creat Ratio  - Comprehensive metabolic panel (BMP + Alb, Alk Phos, ALT, AST, Total. Bili, TP)  - CBC with platelets differential  - glipiZIDE (GLUCOTROL XL) 10 MG 24 hr tablet; TAKE 2 TABLETS BY MOUTH EVERY DAY  - metFORMIN (GLUCOPHAGE-XR) 500 MG 24 hr tablet; Take 2 tablets (1,000 mg) by mouth 2 times daily (with meals) TAKE 2 TABLETS BY MOUTH TWICE A DAY WITH MEALS  - FOOT EXAM      Essential hypertension    Well controlled. Continue lisinopril-hydrochlorothiazide.     - Comprehensive metabolic panel (BMP + Alb, Alk Phos, ALT, AST, Total. Bili, TP)  - CBC with platelets differential  - lisinopril-hydrochlorothiazide (ZESTORETIC) 20-12.5 MG tablet; Take 2 tablets by mouth daily      Hyperlipidemia with target LDL less than 100    Switch back to pravastatin as that was better tolerated by patient. She hasn't taken medication in about a month.    - Lipid panel reflex to direct LDL Non-fasting  - pravastatin (PRAVACHOL) 20 MG tablet; Take 1 tablet (20 mg) by mouth daily      Vitamin D deficiency    Check vitamin D level.    - Vitamin D Deficiency        Patient declines Hep C and HIV screening today but will consider them in the future.                 FUTURE APPOINTMENTS:       - Follow-up visit in 6 months    No follow-ups on file.    [facility]    Nisreen Florez is a 56 year old who presents for the following health issues     HPI     Patient requesting a prescription on pravastatin. Atorvastatin caused muscle aches but has tolerated pravastatin in the past.     Diabetes Follow-up    How often are you checking your blood sugar? Two times daily  Blood sugar testing frequency justification:  Normal Regimen  What time of day are  "you checking your blood sugars (select all that apply)?  Before meals and After meals  Have you had any blood sugars above 200?  No  Have you had any blood sugars below 70?  No    What symptoms do you notice when your blood sugar is low?  None    What concerns do you have today about your diabetes? None     Do you have any of these symptoms? (Select all that apply)  No numbness or tingling in feet.  No redness, sores or blisters on feet.  No complaints of excessive thirst.  No reports of blurry vision.  No significant changes to weight.      BP Readings from Last 2 Encounters:   07/05/21 108/78   02/22/21 124/80     Hemoglobin A1C (%)   Date Value   03/10/2020 7.3 (H)   10/29/2019 9.9 (H)     LDL Cholesterol Calculated (mg/dL)   Date Value   10/29/2019 82   04/06/2019 88               Hyperlipidemia Follow-Up      Are you regularly taking any medication or supplement to lower your cholesterol?  Stopped Atorvastatin     Are you having muscle aches or other side effects that you think could be caused by your cholesterol lowering medication?  Yes- Atorvastatin-Requesting prevastatin      How many servings of fruits and vegetables do you eat daily?  2-3    On average, how many sweetened beverages do you drink each day (Examples: soda, juice, sweet tea, etc.  Do NOT count diet or artificially sweetened beverages)?   0    How many days per week do you exercise enough to make your heart beat faster? 7    How many minutes a day do you exercise enough to make your heart beat faster? 60 or more    How many days per week do you miss taking your medication? Occasional      Review of Systems   Constitutional, HEENT, cardiovascular, pulmonary, gi and gu systems are negative, except as otherwise noted.        Objective    /78 (BP Location: Right arm, Patient Position: Chair, Cuff Size: Adult Regular)   Pulse 76   Temp 98.3  F (36.8  C) (Oral)   Resp 16   Ht 1.575 m (5' 2\")   Wt 89.4 kg (197 lb 1.6 oz)   SpO2 97%   BMI " 36.05 kg/m    Body mass index is 36.05 kg/m .       Physical Exam   GENERAL: healthy, alert and no distress  EYES: Eyes grossly normal to inspection, PERRL and conjunctivae and sclerae normal  RESP: lungs clear to auscultation - no rales, rhonchi or wheezes  CV: regular rate and rhythm, normal S1 S2, no S3 or S4, no murmur, click or rub, no peripheral edema and peripheral pulses strong  MS: no gross musculoskeletal defects noted, no edema  SKIN: no suspicious lesions or rashes  NEURO: Normal strength and tone, mentation intact and speech normal  PSYCH: mentation appears normal, affect normal/bright  Diabetic foot exam: normal DP and PT pulses, no trophic changes or ulcerative lesions and normal sensory exam

## 2021-07-06 LAB
ALBUMIN SERPL-MCNC: 3.8 G/DL (ref 3.4–5)
ALP SERPL-CCNC: 92 U/L (ref 40–150)
ALT SERPL W P-5'-P-CCNC: 30 U/L (ref 0–50)
ANION GAP SERPL CALCULATED.3IONS-SCNC: 8 MMOL/L (ref 3–14)
AST SERPL W P-5'-P-CCNC: 12 U/L (ref 0–45)
BILIRUB SERPL-MCNC: 0.3 MG/DL (ref 0.2–1.3)
BUN SERPL-MCNC: 19 MG/DL (ref 7–30)
CALCIUM SERPL-MCNC: 9.5 MG/DL (ref 8.5–10.1)
CHLORIDE SERPL-SCNC: 104 MMOL/L (ref 94–109)
CHOLEST SERPL-MCNC: 164 MG/DL
CO2 SERPL-SCNC: 25 MMOL/L (ref 20–32)
CREAT SERPL-MCNC: 0.76 MG/DL (ref 0.52–1.04)
CREAT UR-MCNC: 93 MG/DL
GFR SERPL CREATININE-BSD FRML MDRD: 89 ML/MIN/{1.73_M2}
GLUCOSE SERPL-MCNC: 162 MG/DL (ref 70–99)
HDLC SERPL-MCNC: 61 MG/DL
LDLC SERPL CALC-MCNC: 76 MG/DL
MICROALBUMIN UR-MCNC: 69 MG/L
MICROALBUMIN/CREAT UR: 74.33 MG/G CR (ref 0–25)
NONHDLC SERPL-MCNC: 103 MG/DL
POTASSIUM SERPL-SCNC: 4 MMOL/L (ref 3.4–5.3)
PROT SERPL-MCNC: 7.4 G/DL (ref 6.8–8.8)
SODIUM SERPL-SCNC: 137 MMOL/L (ref 133–144)
TRIGL SERPL-MCNC: 135 MG/DL
TSH SERPL DL<=0.005 MIU/L-ACNC: 0.98 MU/L (ref 0.4–4)

## 2021-09-09 ENCOUNTER — E-VISIT (OUTPATIENT)
Dept: FAMILY MEDICINE | Facility: CLINIC | Age: 56
End: 2021-09-09
Payer: COMMERCIAL

## 2021-09-09 DIAGNOSIS — J06.9 VIRAL UPPER RESPIRATORY TRACT INFECTION: Primary | ICD-10-CM

## 2021-09-09 PROCEDURE — 99421 OL DIG E/M SVC 5-10 MIN: CPT | Performed by: FAMILY MEDICINE

## 2021-09-09 NOTE — PATIENT INSTRUCTIONS
Patient Education     Adult Self-Care for Colds  Colds are caused by viruses. They can't be cured with antibiotics. But you can ease symptoms and support your body's efforts to heal itself. No matter which symptoms you have, be sure to:    Drink plenty of fluids (water or clear soup)    Stop smoking and drinking alcohol    Get plenty of rest    Stay away from secondhand smoke    Understand a fever    Take your temperature several times a day. If your fever is  100.4  F ( 38 C ) for more than a day, call your healthcare provider.    Relax, lie down. Go to bed if you want. Just get off your feet and rest. Also drink plenty of fluids to prevent dehydration.    Take acetaminophen or a nonsteroidal anti-inflammatory drug (NSAID) such as ibuprofen.    Treating a stuffy nose    Breathe steam or heated humidified air to open blocked nasal passages.  a hot shower or use a vaporizer. Be careful not to get burned by the steam.    Saline nasal sprays and decongestant tablets help open a stuffy nose. Antihistamines can also help. But they can cause side effects. These include drowsiness and drying of the eyes, nose, and mouth.    Soothe a sore throat and cough    Gargle every  2 hours with  1/4 teaspoon of salt dissolved in  1/2 cup of warm water. Suck on throat lozenges and cough drops to moisten your throat.    Cough medicines are available. But it's not clear how well they work.    Take acetaminophen or an NSAID, such as ibuprofen, to ease throat pain. Follow package directions on how much to use and how often to take the medicine.    Ease digestive problems    Put fluids back into your body. Take frequent sips of clear liquids such as water or broth. Stay away from drinks that have a lot of sugar in them. These include juices and sodas. These can make diarrhea worse. Older children and adults can drink sports drinks.    As your appetite returns, you can go back to your normal diet. Ask your healthcare provider if  there are any foods you should not have.    When to call your healthcare provider  When you first notice symptoms, ask your provider if you should take antiviral medicines. Antibiotics should not be taken for colds or flu. Also call your provider if you have any of these symptoms:    You don't feel better after 7 days    Belly pain or vomiting    Symptoms get worse, especially after a period of improvement    Fever of  100.4  F  ( 38 C) or higher, or fever that doesn't go down with medicine, or as advised by your healthcare provider    Dizziness or weakness    Slight shortness of breath or wheezing    Spotted, red, or very sore throat    Signs of dehydration:  ? Extreme thirst  ? Dark urine  ? Not urinating often  ? Dry mouth     When to call 911  Call 911 right away if any of these occur:    Chest pain    Coughing up blood    Fast or irregular heartbeat    Severe trouble breathing    Sudden confusion, fainting, or loss of consciousness     [place] last reviewed this educational content on 10/1/2019    6084-5415 [employer]. All rights reserved. This information is not intended as a substitute for professional medical care. Always follow your healthcare professional's instructions.           Thank you for choosing us for your care. Based on your symptoms and length of illness, I do not think that you need an antibiotic prescription at this time.  Please follow the care advise I ve provided and use the prescribed medication to help relieve your symptoms. View your full visit summary for details by clicking on the link below.     If you re not feeling better within 5-7 days, please respond to this message and we can consider if an antibiotic prescription is needed.  You can schedule an appointment right here in [facility], or call [phone]  If the visit is for the same symptoms as your eVisit, we ll refund the cost of your eVisit if seen within seven days

## 2021-10-24 ENCOUNTER — HEALTH MAINTENANCE LETTER (OUTPATIENT)
Age: 56
End: 2021-10-24

## 2021-10-29 ENCOUNTER — OFFICE VISIT (OUTPATIENT)
Dept: FAMILY MEDICINE | Facility: CLINIC | Age: 56
End: 2021-10-29
Payer: COMMERCIAL

## 2021-10-29 ENCOUNTER — ANCILLARY PROCEDURE (OUTPATIENT)
Dept: GENERAL RADIOLOGY | Facility: CLINIC | Age: 56
End: 2021-10-29
Attending: NURSE PRACTITIONER
Payer: COMMERCIAL

## 2021-10-29 VITALS
DIASTOLIC BLOOD PRESSURE: 76 MMHG | BODY MASS INDEX: 35.81 KG/M2 | OXYGEN SATURATION: 97 % | RESPIRATION RATE: 16 BRPM | HEART RATE: 86 BPM | TEMPERATURE: 97.9 F | WEIGHT: 195.8 LBS | SYSTOLIC BLOOD PRESSURE: 112 MMHG

## 2021-10-29 DIAGNOSIS — E11.9 TYPE 2 DIABETES MELLITUS WITHOUT COMPLICATION, WITHOUT LONG-TERM CURRENT USE OF INSULIN (H): Primary | ICD-10-CM

## 2021-10-29 DIAGNOSIS — E78.5 HYPERLIPIDEMIA WITH TARGET LDL LESS THAN 100: ICD-10-CM

## 2021-10-29 DIAGNOSIS — M25.561 ACUTE PAIN OF RIGHT KNEE: ICD-10-CM

## 2021-10-29 DIAGNOSIS — Z23 NEED FOR PROPHYLACTIC VACCINATION AND INOCULATION AGAINST INFLUENZA: ICD-10-CM

## 2021-10-29 LAB — HBA1C MFR BLD: 8.6 % (ref 0–5.6)

## 2021-10-29 PROCEDURE — 90471 IMMUNIZATION ADMIN: CPT | Performed by: NURSE PRACTITIONER

## 2021-10-29 PROCEDURE — 80061 LIPID PANEL: CPT | Performed by: NURSE PRACTITIONER

## 2021-10-29 PROCEDURE — 83036 HEMOGLOBIN GLYCOSYLATED A1C: CPT | Performed by: NURSE PRACTITIONER

## 2021-10-29 PROCEDURE — 99214 OFFICE O/P EST MOD 30 MIN: CPT | Mod: 25 | Performed by: NURSE PRACTITIONER

## 2021-10-29 PROCEDURE — 36415 COLL VENOUS BLD VENIPUNCTURE: CPT | Performed by: NURSE PRACTITIONER

## 2021-10-29 PROCEDURE — 90682 RIV4 VACC RECOMBINANT DNA IM: CPT | Performed by: NURSE PRACTITIONER

## 2021-10-29 PROCEDURE — 73562 X-RAY EXAM OF KNEE 3: CPT | Mod: RT | Performed by: RADIOLOGY

## 2021-10-29 RX ORDER — GLIPIZIDE 10 MG/1
TABLET, FILM COATED, EXTENDED RELEASE ORAL
Qty: 180 TABLET | Refills: 1 | Status: CANCELLED | OUTPATIENT
Start: 2021-10-29

## 2021-10-29 RX ORDER — LISINOPRIL AND HYDROCHLOROTHIAZIDE 12.5; 2 MG/1; MG/1
2 TABLET ORAL DAILY
Qty: 180 TABLET | Refills: 1 | Status: CANCELLED | OUTPATIENT
Start: 2021-10-29

## 2021-10-29 RX ORDER — PRAVASTATIN SODIUM 20 MG
20 TABLET ORAL DAILY
Qty: 90 TABLET | Refills: 1 | Status: CANCELLED | OUTPATIENT
Start: 2021-10-29

## 2021-10-29 RX ORDER — ATORVASTATIN CALCIUM 20 MG/1
20 TABLET, FILM COATED ORAL DAILY
Qty: 90 TABLET | Refills: 0 | Status: CANCELLED | OUTPATIENT
Start: 2021-10-29

## 2021-10-29 RX ORDER — METFORMIN HCL 500 MG
1000 TABLET, EXTENDED RELEASE 24 HR ORAL 2 TIMES DAILY WITH MEALS
Qty: 360 TABLET | Refills: 1 | Status: CANCELLED | OUTPATIENT
Start: 2021-10-29

## 2021-10-29 ASSESSMENT — PAIN SCALES - GENERAL: PAINLEVEL: EXTREME PAIN (8)

## 2021-10-29 NOTE — PROGRESS NOTES
Assessment & Plan       Type 2 diabetes mellitus without complication, without long-term current use of insulin (H)  Was seen in July and A1c not at goal.  Due for recheck and follow-up with PCP.    - HEMOGLOBIN A1C; Future  - Lipid panel reflex to direct LDL Fasting; Future  - HEMOGLOBIN A1C  - Lipid panel reflex to direct LDL Fasting    Hyperlipidemia with target LDL less than 100  In July statin was changed back to pravastatin due to side effects from other statin.  Lipid panel today with labs.  Follow-up with PCP.   - Lipid panel reflex to direct LDL Fasting; Future  - Lipid panel reflex to direct LDL Fasting    Need for prophylactic vaccination and inoculation against influenza  Administered today.   - INFLUENZA QUAD, RECOMBINANT, P-FREE (RIV4) (FLUBLOK)    Acute pain of right knee  Stop ibuprofen and start voltaren.  She was hoping to get a steroid injection today though this is not something that I do.  Will have her follow-up with ortho.  - diclofenac (VOLTAREN) 50 MG EC tablet; Take 1 tablet (50 mg) by mouth 2 times daily  - Orthopedic  Referral; Future         Return in about 2 months (around 12/29/2021) for diabetes follow up .    ARIELLE Patricio CNP  [facility] LEONARDA joseph is a 56 year old who presents for the following health issues     HPI     Musculoskeletal problem/pain  Onset/Duration: 1.5 months has worsened.   Description  Location: knee - right  Joint Swelling: no  Redness: no  Pain: YES  Warmth: no  Intensity:  severe, 8/10  Progression of Symptoms:  worsening  Accompanying signs and symptoms:   Fevers: no  Numbness/tingling/weakness: no  History  Trauma to the area: no  Recent illness:  no  Previous similar problem: no  Previous evaluation:  no  Precipitating or alleviating factors:  Aggravating factors include: walking  Therapies tried and outcome: pain cream has helped a little.      No clicking, locking, or popping.   Sometimes feels like  it is going to give out on her.   Pain is on the front and inner side of the knee.   Has tried several OTC arthritis creams without improvement.    She is taking ibuprofen 400 mg twice a day without significant improvement.   She is on her feet all day for work.     Review of Systems   Constitutional, HEENT, cardiovascular, pulmonary, gi and gu, MS, skin  systems are negative, except as otherwise noted.      Objective    /76 (BP Location: Right arm, Patient Position: Sitting, Cuff Size: Adult Large)   Pulse 86   Temp 97.9  F (36.6  C) (Oral)   Resp 16   Wt 88.8 kg (195 lb 12.8 oz)   SpO2 97%   BMI 35.81 kg/m    Body mass index is 35.81 kg/m .  Physical Exam   GENERAL: healthy, alert and no distress  MS: no gross musculoskeletal defects noted, R knee: no swelling, redness, or warmth, FROM, mild tenderness along the medial aspect, no laxity   SKIN: no suspicious lesions or rashes    No results found for this or any previous visit (from the past 24 hour(s)).

## 2021-10-29 NOTE — NURSING NOTE
"Chief Complaint   Patient presents with     Knee Pain     Initial /76 (BP Location: Right arm, Patient Position: Sitting, Cuff Size: Adult Large)   Pulse 86   Temp 97.9  F (36.6  C) (Oral)   Resp 16   Wt 88.8 kg (195 lb 12.8 oz)   SpO2 97%   BMI 35.81 kg/m   Estimated body mass index is 35.81 kg/m  as calculated from the following:    Height as of 7/5/21: 1.575 m (5' 2\").    Weight as of this encounter: 88.8 kg (195 lb 12.8 oz).  BP completed using cuff size large right arm    Lisa Magill, CMA    "

## 2021-10-30 LAB
CHOLEST SERPL-MCNC: 215 MG/DL
FASTING STATUS PATIENT QL REPORTED: NO
HDLC SERPL-MCNC: 60 MG/DL
LDLC SERPL CALC-MCNC: 114 MG/DL
NONHDLC SERPL-MCNC: 155 MG/DL
TRIGL SERPL-MCNC: 206 MG/DL

## 2021-11-01 NOTE — PROGRESS NOTES
ASSESSMENT & PLAN  Patient Instructions     1. Primary osteoarthritis of right knee    2. Acute pain of right knee      -Patient has chronic right knee pain due to arthritis  -Reviewed her right knee x-rays with the patient.  Answered all questions  -Patient tolerated cortisone injection today without complications.  Patient was given postprocedure instructions  -Patient will start formal physical therapy and home exercise program  -We had an in-depth discussion regarding the nature of her condition, short-term and long-term treatments including physical therapy, long-term sustainable weight loss, overall strengthening  -Patient was informed that we may perform occasional cortisone injections depending on her response to the treatment.  We also discussed potentially starting viscosupplementation, the gel injection if indicated in the future  -Patient has a physically demanding job that requires her to be on her feet for many hours a day and lifting suitcases.  And so, patient needs to strengthen her legs in order to meet the physical demands of her job as her knee continues to degenerate over time.  Patient may continue to work as tolerated  -Patient will discontinue diclofenac tablets due to side effects.  Patient may continue with ibuprofen or Tylenol as needed until cortisone takes effect  -Patient will follow up when pain returns  -Call direct clinic number [[phone]] at any time with questions or concerns.    Albert Yeo MD [facility]  [facility] [place]          -----    SUBJECTIVE  Vikki Medina is a/an 56 year old female who is seen in consultation at the request of  Karen Bustos C.N.P. for evaluation of right knee pain. The patient is seen by themselves.  She saw her PCP for her knee pain and was given Voltaren tablets, but she stopped taking after 2 days because it made her entire lower body feel numb, she even had a fall when taking it.  Stopped and this is improving.     Onset: 1 month(s) ago. Reports insidious onset without acute precipitating event. Patient does state she has been having waxing and waning right knee pain for the past 1 year.   Location of Pain: right knee, anterior knee  Rating of Pain at worst: 10/10  Rating of Pain Currently: 5/10  Worsened by: walking, states only walking  Better with: Voltaren tablets  Treatments tried: Voltaren tablets   Associated symptoms: pain, denies swelling, stiffness, locking, felling of instability  Orthopedic history: NO  Relevant surgical history: NO  Social history: social history: works for [employer] - on feet all day    Past Medical History:   Diagnosis Date     Delivery normal     , no complications     Third degree hemorrhoids 2021     Social History     Socioeconomic History     Marital status:      Spouse name: Not on file     Number of children: 5     Years of education: Not on file     Highest education level: Not on file   Occupational History     Occupation: customer service     Comment: [employer]   Tobacco Use     Smoking status: Former Smoker     Types: Cigarettes     Quit date: 2018     Years since quitting: 3.7     Smokeless tobacco: Never Used     Tobacco comment: 4-5 per day   Vaping Use     Vaping Use: Never used   Substance and Sexual Activity     Alcohol use: No     Alcohol/week: 0.0 standard drinks     Drug use: No     Sexual activity: Yes     Partners: Male     Birth control/protection: Surgical   Other Topics Concern      Service No     Blood Transfusions No     Caffeine Concern Yes     Comment: 2 per day     Occupational Exposure Not Asked     Hobby Hazards Not Asked     Sleep Concern Not Asked     Stress Concern Not Asked     Weight Concern Not Asked     Special Diet Yes     Comment: dairy per day 0-1     Back Care Not Asked     Exercise No     Comment: just joined [employer]     Bike Helmet Not Asked     Seat Belt Yes     Self-Exams  "Yes     Parent/sibling w/ CABG, MI or angioplasty before 65F 55M? No   Social History Narrative     Not on file     Social Determinants of Health     Financial Resource Strain:      Difficulty of Paying Living Expenses:    Food Insecurity:      Worried About Running Out of Food in the Last Year:      Ran Out of Food in the Last Year:    Transportation Needs:      Lack of Transportation (Medical):      Lack of Transportation (Non-Medical):    Physical Activity:      Days of Exercise per Week:      Minutes of Exercise per Session:    Stress:      Feeling of Stress :    Social Connections:      Frequency of Communication with Friends and Family:      Frequency of Social Gatherings with Friends and Family:      Attends Yarsani Services:      Active Member of Clubs or Organizations:      Attends Club or Organization Meetings:      Marital Status:    Intimate Partner Violence:      Fear of Current or Ex-Partner:      Emotionally Abused:      Physically Abused:      Sexually Abused:          Patient's past medical, surgical, social, and family histories were reviewed today and no changes are noted.    REVIEW OF SYSTEMS:  10 point ROS is negative other than symptoms noted above in HPI, Past Medical History or as stated below  Constitutional: NEGATIVE for fever, chills, change in weight  Skin: NEGATIVE for worrisome rashes, moles or lesions  GI/: NEGATIVE for bowel or bladder changes  Neuro: NEGATIVE for weakness, dizziness or paresthesias    OBJECTIVE:  /89   Ht 1.575 m (5' 2\")   Wt 89.7 kg (197 lb 12.8 oz)   BMI 36.18 kg/m     General: healthy, alert and in no distress  HEENT: no scleral icterus or conjunctival erythema  Skin: no suspicious lesions or rash. No jaundice.  CV: no pedal edema  Resp: normal respiratory effort without conversational dyspnea   Psych: normal mood and affect  Gait: normal steady gait with appropriate coordination and balance  Neuro: Normal light sensory exam of lower " extremity  MSK:  RIGHT KNEE  Inspection:    normal alignment  Palpation:    Tender about the medial joint line. Remainder of bony and ligamentous landmarks are nontender.    Trace effusion is present    Patellofemoral crepitus is Present  Range of Motion:     50 extension to 1150 flexion  Strength:    Quadriceps weakness    Extensor mechanism intact  Special Tests:    Positive: Patellar grind    Negative: MCL/valgus stress (0 & 30 deg), LCL/varus stress (0 & 30 deg), Lachman's, anterior drawer, posterior drawer, Julio Cesar's    Independent visualization of the below image:  No results found for this or any previous visit (from the past 24 hour(s)).       Personal review of x-rays taken on 10/29/2021 shows mild medial compartment and moderate patellofemoral compartment joint space narrowing.  Medial/lateral patellar osteophytes.  No acute fracture or dislocation.    Large Joint Injection/Arthocentesis: R knee joint    Date/Time: 11/2/2021 9:34 AM  Performed by: Yeo, Albert, MD  Authorized by: Yeo, Albert, MD     Indications:  Pain and osteoarthritis  Needle Size:  25 G  Guidance: ultrasound    Location:  Knee      Medications:  40 mg methylPREDNISolone 40 MG/ML  Outcome:  Tolerated well, no immediate complications  Procedure discussed: discussed risks, benefits, and alternatives    Consent Given by:  Patient  Prep: patient was prepped and draped in usual sterile fashion          Albert Yeo MD [facility]

## 2021-11-02 ENCOUNTER — OFFICE VISIT (OUTPATIENT)
Dept: ORTHOPEDICS | Facility: CLINIC | Age: 56
End: 2021-11-02
Payer: COMMERCIAL

## 2021-11-02 VITALS
WEIGHT: 197.8 LBS | HEIGHT: 62 IN | DIASTOLIC BLOOD PRESSURE: 89 MMHG | BODY MASS INDEX: 36.4 KG/M2 | SYSTOLIC BLOOD PRESSURE: 130 MMHG

## 2021-11-02 DIAGNOSIS — M17.11 PRIMARY OSTEOARTHRITIS OF RIGHT KNEE: Primary | ICD-10-CM

## 2021-11-02 DIAGNOSIS — M25.561 ACUTE PAIN OF RIGHT KNEE: ICD-10-CM

## 2021-11-02 PROCEDURE — 20611 DRAIN/INJ JOINT/BURSA W/US: CPT | Mod: RT | Performed by: FAMILY MEDICINE

## 2021-11-02 PROCEDURE — 99204 OFFICE O/P NEW MOD 45 MIN: CPT | Mod: 25 | Performed by: FAMILY MEDICINE

## 2021-11-02 RX ORDER — METHYLPREDNISOLONE ACETATE 40 MG/ML
40 INJECTION, SUSPENSION INTRA-ARTICULAR; INTRALESIONAL; INTRAMUSCULAR; SOFT TISSUE
Status: DISCONTINUED | OUTPATIENT
Start: 2021-11-02 | End: 2021-12-31

## 2021-11-02 RX ADMIN — METHYLPREDNISOLONE ACETATE 40 MG: 40 INJECTION, SUSPENSION INTRA-ARTICULAR; INTRALESIONAL; INTRAMUSCULAR; SOFT TISSUE at 09:34

## 2021-11-02 ASSESSMENT — MIFFLIN-ST. JEOR: SCORE: 1450.46

## 2021-11-02 NOTE — PATIENT INSTRUCTIONS
1. Primary osteoarthritis of right knee    2. Acute pain of right knee      -Patient has chronic right knee pain due to arthritis  -Reviewed her right knee x-rays with the patient.  Answered all questions  -Patient tolerated cortisone injection today without complications.  Patient was given postprocedure instructions  -Patient will start formal physical therapy and home exercise program  -We had an in-depth discussion regarding the nature of her condition, short-term and long-term treatments including physical therapy, long-term sustainable weight loss, overall strengthening  -Patient was informed that we may perform occasional cortisone injections depending on her response to the treatment.  We also discussed potentially starting viscosupplementation, the gel injection if indicated in the future  -Patient has a physically demanding job that requires her to be on her feet for many hours a day and lifting suitcases.  And so, patient needs to strengthen her legs in order to meet the physical demands of her job as her knee continues to degenerate over time.  Patient may continue to work as tolerated  -Patient will discontinue diclofenac tablets due to side effects.  Patient may continue with ibuprofen or Tylenol as needed until cortisone takes effect  -Patient will follow up when pain returns  -Call direct clinic number [[phone]] at any time with questions or concerns.    Albert Yeo MD [facility] Orthopedics and Sports Medicine  [facility] [place]

## 2021-11-02 NOTE — LETTER
11/2/2021         RE: Vikki Medina  [address] [place] [address]        Dear Colleague,    Thank you for referring your patient, Vikki Medina, to the [facility] [place]. Please see a copy of my visit note below.    ASSESSMENT & PLAN  Patient Instructions     1. Primary osteoarthritis of right knee    2. Acute pain of right knee      -Patient has chronic right knee pain due to arthritis  -Reviewed her right knee x-rays with the patient.  Answered all questions  -Patient tolerated cortisone injection today without complications.  Patient was given postprocedure instructions  -Patient will start formal physical therapy and home exercise program  -We had an in-depth discussion regarding the nature of her condition, short-term and long-term treatments including physical therapy, long-term sustainable weight loss, overall strengthening  -Patient was informed that we may perform occasional cortisone injections depending on her response to the treatment.  We also discussed potentially starting viscosupplementation, the gel injection if indicated in the future  -Patient has a physically demanding job that requires her to be on her feet for many hours a day and lifting suitcases.  And so, patient needs to strengthen her legs in order to meet the physical demands of her job as her knee continues to degenerate over time.  Patient may continue to work as tolerated  -Patient will discontinue diclofenac tablets due to side effects.  Patient may continue with ibuprofen or Tylenol as needed until cortisone takes effect  -Patient will follow up when pain returns  -Call direct clinic number [[phone]] at any time with questions or concerns.    Albert Yeo MD [facility] Orthopedics and Sports Medicine  [facility]          -----    SUBJECTIVE  Vikki Medina is a/an 56 year old female who is seen in consultation at the request of   Karen Bustos C.N.P. for evaluation of right knee pain. The patient is seen by themselves.  She saw her PCP for her knee pain and was given Voltaren tablets, but she stopped taking after 2 days because it made her entire lower body feel numb, she even had a fall when taking it. Stopped and this is improving.     Onset: 1 month(s) ago. Reports insidious onset without acute precipitating event. Patient does state she has been having waxing and waning right knee pain for the past 1 year.   Location of Pain: right knee, anterior knee  Rating of Pain at worst: 10/10  Rating of Pain Currently: 5/10  Worsened by: walking, states only walking  Better with: Voltaren tablets  Treatments tried: Voltaren tablets   Associated symptoms: pain, denies swelling, stiffness, locking, felling of instability  Orthopedic history: NO  Relevant surgical history: NO  Social history: social history: works for [employer] - on feet all day    Past Medical History:   Diagnosis Date     Delivery normal     , no complications     Third degree hemorrhoids 2021     Social History     Socioeconomic History     Marital status:      Spouse name: Not on file     Number of children: 5     Years of education: Not on file     Highest education level: Not on file   Occupational History     Occupation: customer service     Comment: [employer]   Tobacco Use     Smoking status: Former Smoker     Types: Cigarettes     Quit date: 2018     Years since quitting: 3.7     Smokeless tobacco: Never Used     Tobacco comment: 4-5 per day   Vaping Use     Vaping Use: Never used   Substance and Sexual Activity     Alcohol use: No     Alcohol/week: 0.0 standard drinks     Drug use: No     Sexual activity: Yes     Partners: Male     Birth control/protection: Surgical   Other Topics Concern      Service No     Blood Transfusions No     Caffeine Concern Yes     Comment: 2 per day     Occupational Exposure Not Asked     Hobby Hazards  "Not Asked     Sleep Concern Not Asked     Stress Concern Not Asked     Weight Concern Not Asked     Special Diet Yes     Comment: dairy per day 0-1     Back Care Not Asked     Exercise No     Comment: just joined [employer]     Bike Helmet Not Asked     Seat Belt Yes     Self-Exams Yes     Parent/sibling w/ CABG, MI or angioplasty before 65F 55M? No   Social History Narrative     Not on file     Social Determinants of Health     Financial Resource Strain:      Difficulty of Paying Living Expenses:    Food Insecurity:      Worried About Running Out of Food in the Last Year:      Ran Out of Food in the Last Year:    Transportation Needs:      Lack of Transportation (Medical):      Lack of Transportation (Non-Medical):    Physical Activity:      Days of Exercise per Week:      Minutes of Exercise per Session:    Stress:      Feeling of Stress :    Social Connections:      Frequency of Communication with Friends and Family:      Frequency of Social Gatherings with Friends and Family:      Attends Religion Services:      Active Member of Clubs or Organizations:      Attends Club or Organization Meetings:      Marital Status:    Intimate Partner Violence:      Fear of Current or Ex-Partner:      Emotionally Abused:      Physically Abused:      Sexually Abused:          Patient's past medical, surgical, social, and family histories were reviewed today and no changes are noted.    REVIEW OF SYSTEMS:  10 point ROS is negative other than symptoms noted above in HPI, Past Medical History or as stated below  Constitutional: NEGATIVE for fever, chills, change in weight  Skin: NEGATIVE for worrisome rashes, moles or lesions  GI/: NEGATIVE for bowel or bladder changes  Neuro: NEGATIVE for weakness, dizziness or paresthesias    OBJECTIVE:  /89   Ht 1.575 m (5' 2\")   Wt 89.7 kg (197 lb 12.8 oz)   BMI 36.18 kg/m     General: healthy, alert and in no distress  HEENT: no scleral icterus or conjunctival erythema  Skin: " no suspicious lesions or rash. No jaundice.  CV: no pedal edema  Resp: normal respiratory effort without conversational dyspnea   Psych: normal mood and affect  Gait: normal steady gait with appropriate coordination and balance  Neuro: Normal light sensory exam of lower extremity  MSK:  RIGHT KNEE  Inspection:    normal alignment  Palpation:    Tender about the medial joint line. Remainder of bony and ligamentous landmarks are nontender.    Trace effusion is present    Patellofemoral crepitus is Present  Range of Motion:     50 extension to 1150 flexion  Strength:    Quadriceps weakness    Extensor mechanism intact  Special Tests:    Positive: Patellar grind    Negative: MCL/valgus stress (0 & 30 deg), LCL/varus stress (0 & 30 deg), Lachman's, anterior drawer, posterior drawer, Julio Cesar's    Independent visualization of the below image:  No results found for this or any previous visit (from the past 24 hour(s)).       Personal review of x-rays taken on 10/29/2021 shows mild medial compartment and moderate patellofemoral compartment joint space narrowing.  Medial/lateral patellar osteophytes.  No acute fracture or dislocation.    Large Joint Injection/Arthocentesis: R knee joint    Date/Time: 11/2/2021 9:34 AM  Performed by: Yeo, Albert, MD  Authorized by: Yeo, Albert, MD     Indications:  Pain and osteoarthritis  Needle Size:  25 G  Guidance: ultrasound    Location:  Knee      Medications:  40 mg methylPREDNISolone 40 MG/ML  Outcome:  Tolerated well, no immediate complications  Procedure discussed: discussed risks, benefits, and alternatives    Consent Given by:  Patient  Prep: patient was prepped and draped in usual sterile fashion          Albert Yeo MD [facility]        Again, thank you for allowing me to participate in the care of your patient.        Sincerely,        Albert Yeo, MD

## 2021-11-23 ENCOUNTER — IMMUNIZATION (OUTPATIENT)
Dept: NURSING | Facility: CLINIC | Age: 56
End: 2021-11-23
Payer: COMMERCIAL

## 2021-11-23 DIAGNOSIS — Z23 HIGH PRIORITY FOR 2019-NCOV VACCINE: Primary | ICD-10-CM

## 2021-11-23 PROCEDURE — 91300 COVID-19,PF,PFIZER (12+ YRS): CPT

## 2021-11-23 PROCEDURE — 0004A COVID-19,PF,PFIZER (12+ YRS): CPT

## 2021-12-21 ENCOUNTER — OFFICE VISIT (OUTPATIENT)
Dept: FAMILY MEDICINE | Facility: CLINIC | Age: 56
End: 2021-12-21
Payer: COMMERCIAL

## 2021-12-21 VITALS
DIASTOLIC BLOOD PRESSURE: 86 MMHG | BODY MASS INDEX: 35.12 KG/M2 | TEMPERATURE: 97.9 F | WEIGHT: 192 LBS | OXYGEN SATURATION: 99 % | SYSTOLIC BLOOD PRESSURE: 128 MMHG | HEART RATE: 77 BPM | RESPIRATION RATE: 16 BRPM

## 2021-12-21 DIAGNOSIS — N18.2 CKD (CHRONIC KIDNEY DISEASE) STAGE 2, GFR 60-89 ML/MIN: ICD-10-CM

## 2021-12-21 DIAGNOSIS — I10 ESSENTIAL HYPERTENSION: ICD-10-CM

## 2021-12-21 DIAGNOSIS — E11.65 TYPE 2 DIABETES MELLITUS WITH HYPERGLYCEMIA, WITHOUT LONG-TERM CURRENT USE OF INSULIN (H): Primary | ICD-10-CM

## 2021-12-21 DIAGNOSIS — E78.5 HYPERLIPIDEMIA WITH TARGET LDL LESS THAN 100: ICD-10-CM

## 2021-12-21 DIAGNOSIS — E55.9 VITAMIN D DEFICIENCY: ICD-10-CM

## 2021-12-21 LAB
DEPRECATED CALCIDIOL+CALCIFEROL SERPL-MC: 51 UG/L (ref 20–75)
HBA1C MFR BLD: 8.3 % (ref 0–5.6)

## 2021-12-21 PROCEDURE — 90471 IMMUNIZATION ADMIN: CPT | Performed by: FAMILY MEDICINE

## 2021-12-21 PROCEDURE — 36415 COLL VENOUS BLD VENIPUNCTURE: CPT | Performed by: FAMILY MEDICINE

## 2021-12-21 PROCEDURE — 83036 HEMOGLOBIN GLYCOSYLATED A1C: CPT | Performed by: FAMILY MEDICINE

## 2021-12-21 PROCEDURE — 99214 OFFICE O/P EST MOD 30 MIN: CPT | Mod: 25 | Performed by: FAMILY MEDICINE

## 2021-12-21 PROCEDURE — 82306 VITAMIN D 25 HYDROXY: CPT | Performed by: FAMILY MEDICINE

## 2021-12-21 PROCEDURE — 90732 PPSV23 VACC 2 YRS+ SUBQ/IM: CPT | Performed by: FAMILY MEDICINE

## 2021-12-21 PROCEDURE — 82043 UR ALBUMIN QUANTITATIVE: CPT | Performed by: FAMILY MEDICINE

## 2021-12-21 RX ORDER — PRAVASTATIN SODIUM 20 MG
20 TABLET ORAL DAILY
Qty: 90 TABLET | Refills: 1 | Status: SHIPPED | OUTPATIENT
Start: 2021-12-21 | End: 2022-07-18

## 2021-12-21 RX ORDER — LISINOPRIL AND HYDROCHLOROTHIAZIDE 12.5; 2 MG/1; MG/1
2 TABLET ORAL DAILY
Qty: 180 TABLET | Refills: 1 | Status: SHIPPED | OUTPATIENT
Start: 2021-12-21 | End: 2022-08-09

## 2021-12-21 RX ORDER — GLIPIZIDE 10 MG/1
TABLET, FILM COATED, EXTENDED RELEASE ORAL
Qty: 180 TABLET | Refills: 1 | Status: SHIPPED | OUTPATIENT
Start: 2021-12-21 | End: 2022-07-18

## 2021-12-21 RX ORDER — METFORMIN HCL 500 MG
1000 TABLET, EXTENDED RELEASE 24 HR ORAL 2 TIMES DAILY WITH MEALS
Qty: 360 TABLET | Refills: 1 | Status: SHIPPED | OUTPATIENT
Start: 2021-12-21 | End: 2022-08-09

## 2021-12-21 ASSESSMENT — PAIN SCALES - GENERAL: PAINLEVEL: SEVERE PAIN (6)

## 2021-12-21 NOTE — PROGRESS NOTES
"  Assessment & Plan     (E11.65) Type 2 diabetes mellitus with hyperglycemia, without long-term current use of insulin (H)  (primary encounter diagnosis)  Comment: due for labs, if still high, will add jardiance, keep up good work with diet changes and exercise  Plan: Hemoglobin A1c, Albumin Random Urine         Quantitative with Creat Ratio            (E78.5) Hyperlipidemia with target LDL less than 100  Comment: recheck  Plan: pravastatin (PRAVACHOL) 20 MG tablet        refilled    (N18.2) CKD (chronic kidney disease) stage 2, GFR 60-89 ml/min  Comment: recheck labs, discussed concerns  Plan: Albumin Random Urine Quantitative with Creat         Ratio            (I10) Essential hypertension  Comment: controlled, cont same, protecting kidneys as well, discussed with pt  Plan: lisinopril-hydrochlorothiazide (ZESTORETIC)         20-12.5 MG tablet            (E55.9) Vitamin D deficiency  Comment: recheck  Plan: Vitamin D Deficiency        Cont OTC 2000 per day      Ordering of each unique test  Prescription drug management  28 minutes spent on the date of the encounter doing chart review, history and exam, documentation and further activities per the note       BMI:   Estimated body mass index is 35.12 kg/m  as calculated from the following:    Height as of 11/2/21: 1.575 m (5' 2\").    Weight as of this encounter: 87.1 kg (192 lb).   Weight management plan: Discussed healthy diet and exercise guidelines    Work on weight loss  Regular exercise    Return in about 6 months (around 6/21/2022) for Preventive Visit.    Tata Belle MD  [facility] LEONARDA joseph is a 56 year old who presents for the following health issues     History of Present Illness       Diabetes:   She presents for follow up of diabetes.  She is checking home blood glucose two times daily. She checks blood glucose before and after meals and at bedtime.  Blood glucose is sometimes over 200 and never under 70. She " is aware of hypoglycemia symptoms including weakness. She has no concerns regarding her diabetes at this time.  She is not experiencing numbness or burning in feet, excessive thirst, blurry vision, weight changes or redness, sores or blisters on feet. The patient has had a diabetic eye exam in the last 12 months. Eye exam performed on Feb.2020. Location of last eye exam [facility].        was over 200 due to doni donuts, and now stopped her pop.  Eating brown rice, salmon, and then wants something sweet, banana is her snack, eats at 5pm, brown rice and fish, salads at times, apples, an orange. Now drinking a lot of water.  Lab Results   Component Value Date    A1C 8.6 10/29/2021    A1C 8.6 07/05/2021    A1C 7.3 03/10/2020    A1C 9.9 10/29/2019    A1C 9.6 04/06/2019    A1C 7.7 10/01/2018     She was exercising in march, diet in 3/2020, took 9 month leave, was home during covid.   Hx of knee pain, steroid shots done and big improvement, voltaran cream was stopped, it caused her leg to be weak, so then took oral voltaren and it made her legs week, fallen twice, she has weakness. Started to exercise, and squats, tredmill.     Last sept got very ill, was in bed for 3 days, did not have covid, it hurts her ribs and back, and wondered about broken ribs, and it has gotten alittle better.   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  Vit d def, is taking vit D now, wondering if her levels are improving    Patient presents for a recheck of hyperlipidemia.  The patient is not currently following a low fat diet plan and is following a regular exercise plan.  LDL Cholesterol Calculated   Date Value Ref Range Status   10/29/2021 114 (H) <=100 mg/dL Final   07/05/2021 76 <100 mg/dL Final     Comment:     Desirable:       <100 mg/dl             Review of Systems   CONSTITUTIONAL: NEGATIVE for fever, chills, change in weight  ENT/MOUTH: NEGATIVE for ear, mouth and throat  problems  RESP: NEGATIVE for significant cough or SOB  CV: NEGATIVE for chest pain, palpitations or peripheral edema      Objective    /86 (BP Location: Right arm, Patient Position: Sitting, Cuff Size: Adult Regular)   Pulse 77   Temp 97.9  F (36.6  C) (Oral)   Resp 16   Wt 87.1 kg (192 lb)   SpO2 99%   BMI 35.12 kg/m    Body mass index is 35.12 kg/m .  Physical Exam   GENERAL: healthy, alert and no distress  NECK: no adenopathy, no asymmetry, masses, or scars and thyroid normal to palpation  RESP: lungs clear to auscultation - no rales, rhonchi or wheezes  CV: regular rate and rhythm, normal S1 S2, no S3 or S4, no murmur, click or rub, no peripheral edema and peripheral pulses strong  MS: no gross musculoskeletal defects noted, no edema  Diabetic foot exam: normal DP and PT pulses, no trophic changes or ulcerative lesions, normal sensory exam and normal monofilament exam                 Attending Only

## 2021-12-22 LAB
CREAT UR-MCNC: 71 MG/DL
MICROALBUMIN UR-MCNC: 78 MG/L
MICROALBUMIN/CREAT UR: 109.86 MG/G CR (ref 0–25)

## 2021-12-23 PROBLEM — N18.2 CKD (CHRONIC KIDNEY DISEASE) STAGE 2, GFR 60-89 ML/MIN: Status: ACTIVE | Noted: 2021-12-23

## 2021-12-23 PROBLEM — I10 ESSENTIAL HYPERTENSION: Status: ACTIVE | Noted: 2021-12-23

## 2021-12-27 ENCOUNTER — OFFICE VISIT (OUTPATIENT)
Dept: URGENT CARE | Facility: URGENT CARE | Age: 56
End: 2021-12-27
Payer: OTHER MISCELLANEOUS

## 2021-12-27 ENCOUNTER — ANCILLARY PROCEDURE (OUTPATIENT)
Dept: GENERAL RADIOLOGY | Facility: CLINIC | Age: 56
End: 2021-12-27
Attending: FAMILY MEDICINE
Payer: COMMERCIAL

## 2021-12-27 VITALS
HEIGHT: 61 IN | TEMPERATURE: 99.1 F | DIASTOLIC BLOOD PRESSURE: 72 MMHG | WEIGHT: 193 LBS | HEART RATE: 96 BPM | BODY MASS INDEX: 36.44 KG/M2 | SYSTOLIC BLOOD PRESSURE: 110 MMHG | RESPIRATION RATE: 20 BRPM

## 2021-12-27 DIAGNOSIS — S80.02XA CONTUSION OF LEFT KNEE, INITIAL ENCOUNTER: ICD-10-CM

## 2021-12-27 DIAGNOSIS — M25.562 ACUTE PAIN OF LEFT KNEE: Primary | ICD-10-CM

## 2021-12-27 PROCEDURE — 99213 OFFICE O/P EST LOW 20 MIN: CPT | Performed by: FAMILY MEDICINE

## 2021-12-27 PROCEDURE — 73562 X-RAY EXAM OF KNEE 3: CPT | Mod: LT | Performed by: RADIOLOGY

## 2021-12-27 RX ORDER — HYDROCODONE BITARTRATE AND ACETAMINOPHEN 5; 325 MG/1; MG/1
1 TABLET ORAL EVERY 4 HOURS PRN
Qty: 15 TABLET | Refills: 0 | Status: SHIPPED | OUTPATIENT
Start: 2021-12-27 | End: 2022-08-09

## 2021-12-27 ASSESSMENT — MIFFLIN-ST. JEOR: SCORE: 1412.82

## 2021-12-27 NOTE — PROGRESS NOTES
"    CHIEF COMPLAINT    L knee pain      HISTORY    Patient injured at work 1 day ago.  She works as a  for [employer].  Date of injury was 12/26/2021.    She fell forward and evidently landed on her left knee.  She has had pain and stiffness developing since that time.  Was unable to work today.      REVIEW OF SYSTEMS    Unremarkable except as above.      EXAM  /72   Pulse 96   Temp 99.1  F (37.3  C) (Oral)   Resp 20   Ht 1.549 m (5' 1\")   Wt 87.5 kg (193 lb)   BMI 36.47 kg/m      Left lower extremity -    I believe she has a small to medium effusion in left knee.    Left knee is generally tender at joint lines and over patella.  Range of motion from about 15 degrees through 80 degrees.  Antalgic gait.    X-rays show some degenerative change but no obvious fracture.      (M25.562) Acute pain of left knee  (primary encounter diagnosis)  Comment:     Significant pain and effusion.  Contusion and possible additional soft tissue injury.  We will have her see orthopedics.    Plan: XR Knee Left 3 Views, COMFOR--KNEE IMMOBILIZER         20, Orthopedic  Referral            (S80.02XA) Contusion of left knee, initial encounter  Comment:   Plan: HYDROcodone-acetaminophen (NORCO) 5-325 MG         tablet, COMFOR--KNEE IMMOBILIZER 20, Orthopedic         Referral                  "

## 2021-12-27 NOTE — LETTER
[facility]  [address]  [address]  [place] [address]  Phone: [phone]  Fax: [phone]      REPORT OF WORK ABILITY    NOTE TO EMPLOYEE: You must promptly provide a copy of this report to your  employer or worker's compensation insurer, and Qualified Rehabilitation Consultant.    Date: 12/27/2021                     Employee Name: Vikki Medina         YOB: 1965  Medical Record Number: [id number]   Soc.Sec.No: [id number]  Employer:  [employer]                Date of Injury: 12-  Managed Care Organization / Insurance Company Name: UNKNOWN    Diagnosis:   Left knee contusion.  Work Related: yes     MMI: NO   Permanent Partial Disability(PPD) likely: UNKNOWN    EMPLOYEE IS ABLE TO WORK: OFF Work until reviewed by Orthopedics.     RESTRICTIONS IF ANY:    Unable to work    OTHER RESTRICTIONS: None    TREATMENT PLAN/NOTES:     Brace. Consult.      Inder Ring MD on 12/27/2021 at 3:08 PM

## 2021-12-31 ENCOUNTER — OFFICE VISIT (OUTPATIENT)
Dept: ORTHOPEDICS | Facility: CLINIC | Age: 56
End: 2021-12-31
Attending: FAMILY MEDICINE
Payer: OTHER MISCELLANEOUS

## 2021-12-31 VITALS — SYSTOLIC BLOOD PRESSURE: 116 MMHG | DIASTOLIC BLOOD PRESSURE: 72 MMHG

## 2021-12-31 DIAGNOSIS — S80.02XA CONTUSION OF LEFT KNEE, INITIAL ENCOUNTER: ICD-10-CM

## 2021-12-31 DIAGNOSIS — M17.12 PRIMARY OSTEOARTHRITIS OF LEFT KNEE: ICD-10-CM

## 2021-12-31 DIAGNOSIS — M76.52 PATELLAR TENDINITIS OF LEFT KNEE: ICD-10-CM

## 2021-12-31 DIAGNOSIS — M25.562 ACUTE PAIN OF LEFT KNEE: Primary | ICD-10-CM

## 2021-12-31 PROCEDURE — 99214 OFFICE O/P EST MOD 30 MIN: CPT | Performed by: STUDENT IN AN ORGANIZED HEALTH CARE EDUCATION/TRAINING PROGRAM

## 2021-12-31 NOTE — PATIENT INSTRUCTIONS
1. Acute pain of left knee    2. Contusion of left knee, initial encounter    3. Patellar tendinitis of left knee    4. Primary osteoarthritis of left knee      - Rest, compression sleeve/knee brace and weight-bearing as tolerated with crutches   - Avoid activities that provoke pain  - Gentle range of motion (bending and straightening) of the knee frequently throughout the day to avoid stiffness.  - Icing for 10-15 minutes 3-4 times per day  - Tylenol 1000 mg up to 3 times per day for pain  - Work note provided releasing from work until 2 week follow up    Please schedule a follow up appointment to see me in 2 weeks, or sooner as needed for persistence or worsening of pain. You may call our direct clinic number ([phone]) at any time with questions or concerns.    Teresa Mcpherson MD, [facility]

## 2021-12-31 NOTE — PROGRESS NOTES
ASSESSMENT & PLAN    1. Acute pain of left knee    2. Contusion of left knee, initial encounter    3. Patellar tendinitis of left knee    4. Primary osteoarthritis of left knee      Vikki Medina is a 56 year old female presenting for evaluation of acute anterior right knee pain after a fall at work on 12/26/21 (). History, exam and imaging findings were reviewed today, consistent with knee contusion and osteoarthritis flare. Limited bedside ultrasound demonstrated evidence of patellar tendinitis without rupture or patellar fracture, as well as soft tissue swelling. Differential includes occult fracture, meniscal injury and/or collateral ligament injury although examination is reassuring. Ligamentous structures appears stable with no pain on palpation or with stressing. Reviewed treatment options inclusive of pain control, activity modification, bracing and formal physical therapy. Also reviewed timing of advance imaging (ie MRI).     At this time, will proceed with the following plan:  - Rest, compression sleeve, hinged knee brace and weight-bearing as tolerated with crutches   - Avoid activities that provoke pain  - Gentle range of motion (bending and straightening) of the knee frequently throughout the day to prevent stiffness.  - Icing for 10-15 minutes 3-4 times per day  - Tylenol 1000 mg up to 3 times per day for pain  - Work note provided releasing from work until 2 week follow up    Please schedule a follow up appointment to see me in 2 weeks, or sooner as needed for persistence or worsening of pain. You may call our direct clinic number ([phone]) at any time with questions or concerns.    Teresa Mcpherson MD, [facility]    -----    SUBJECTIVE  Vikki Medina is a/an 56 year old female who is seen as an Urgent Care referral for evaluation of acute left knee pain (Work Comp). The patient is seen with their daughter.    Onset: 12/26/21, 5  day(s) ago. Patient describes injury as trip and fall onto the anterior knee at work. She reports she fell forward landing on the left knee and noted immediate pain over the anterior knee. She was seen in Urgent Care the following day where XR was negative, exam demonstrated effusion and concern for contusion. She was provided with a knee immobilizer and Norco which she did not tolerate well and thus stopped taking. She has been unable to return to work since that time due to persistence of pain, swelling and stiffness of the knee. She has been using a cane to assist with ambulation. No instability or mechanical symptoms although she feels that she can't trust the knee.  Location of Pain: left knee pain located anteroirly  Rating of Pain at worst: 9/10  Rating of Pain Currently: 7/10  Worsened by: ascending stairs> descending, walking or standing for prolonged periods of time, increased pain at nighttime causing difficulties falling and staying asleep.   Better with: rest  Treatments tried: Nocro prescription from : only took 2 doses, Tylenol, compression sleeve.   Associated symptoms: improved swelling. Bruising over the proximal patellar tendon.   Orthopedic history: YES - Date: right knee DJD   Relevant surgical history: NO  Social history: Works for [employer] as .     Past Medical History:   Diagnosis Date     Delivery normal     , no complications     Third degree hemorrhoids 2021     Social History     Socioeconomic History     Marital status:      Spouse name: Not on file     Number of children: 5     Years of education: Not on file     Highest education level: Not on file   Occupational History     Occupation: customer service     Comment: [employer]   Tobacco Use     Smoking status: Former Smoker     Types: Cigarettes     Quit date: 2018     Years since quitting: 3.9     Smokeless tobacco: Never Used     Tobacco comment: 4-5 per day   Vaping Use     Vaping Use: Never used    Substance and Sexual Activity     Alcohol use: No     Alcohol/week: 0.0 standard drinks     Drug use: No     Sexual activity: Yes     Partners: Male     Birth control/protection: Surgical   Other Topics Concern      Service No     Blood Transfusions No     Caffeine Concern Yes     Comment: 2 per day     Occupational Exposure Not Asked     Hobby Hazards Not Asked     Sleep Concern Not Asked     Stress Concern Not Asked     Weight Concern Not Asked     Special Diet Yes     Comment: dairy per day 0-1     Back Care Not Asked     Exercise No     Comment: just joined [employer]     Bike Helmet Not Asked     Seat Belt Yes     Self-Exams Yes     Parent/sibling w/ CABG, MI or angioplasty before 65F 55M? No   Social History Narrative     Not on file     Social Determinants of Health     Financial Resource Strain: Not on file   Food Insecurity: Not on file   Transportation Needs: Not on file   Physical Activity: Not on file   Stress: Not on file   Social Connections: Not on file   Intimate Partner Violence: Not on file   Housing Stability: Not on file     Patient's past medical, surgical, social, and family histories were reviewed today and no changes are noted.    REVIEW OF SYSTEMS:  10 point ROS is negative other than symptoms noted above in HPI, Past Medical History or as stated below  Constitutional: NEGATIVE for fever, chills, change in weight  Skin: NEGATIVE for worrisome rashes, moles or lesions  GI/: NEGATIVE for bowel or bladder changes  Neuro: NEGATIVE for weakness, dizziness or paresthesias    OBJECTIVE:  /72 (BP Location: Right arm, Patient Position: Chair, Cuff Size: Adult Regular)    General: healthy, alert and in no distress  HEENT: no scleral icterus or conjunctival erythema  Skin: no suspicious lesions or rash. No jaundice.  CV: no pedal edema  Resp: normal respiratory effort without conversational dyspnea   Psych: normal mood and affect  Gait: normal steady gait with appropriate  coordination and balance  Neuro: Normal light sensory exam of lower extremity  MSK:  LEFT KNEE  Inspection:    normal alignment  Palpation:    Tender about the medial patellar facet, inferior pole patella and proxima patella tendon (with associated small overlying bruise), distal patellar tendon and tibial tubercle, and medial joint line. Remainder of bony and ligamentous landmarks are nontender.    Moderate effusion is present    Patellofemoral crepitus is Present  Range of Motion:     00 extension to 900 flexion  Strength:    Quadriceps 5/5 and hamstrings 4+/5    Extensor mechanism intact  Special Tests:    Positive: Patellar grind, Julio Cesar's (pain without click, limited due to guarding)    Negative: MCL/valgus stress (0 & 30 deg), LCL/varus stress (0 & 30 deg), Lachman's, anterior drawer, posterior drawer    Independent visualization of the below image:    XR LEFT KNEE THREE VIEWS   12/27/2021 2:36 PM                                                    IMPRESSION: Mild to moderate tricompartmental degenerative change. No  evidence of acute fracture. Prominent tibial tubercle. Large joint  effusion.  MD Teresa LEA MD, [facility]

## 2021-12-31 NOTE — LETTER
12/31/2021         RE: Vikki Medina  [address] [place] [place] [address]        Dear Colleague,    Thank you for referring your patient, Vikki Medina, to the [facility] [place]. Please see a copy of my visit note below.    ASSESSMENT & PLAN    1. Acute pain of left knee    2. Contusion of left knee, initial encounter    3. Patellar tendinitis of left knee    4. Primary osteoarthritis of left knee      Vikki Medina is a 56 year old female presenting for evaluation of acute anterior right knee pain after a fall at work on 12/26/21 (). History, exam and imaging findings were reviewed today, consistent with knee contusion and osteoarthritis flare. Limited bedside ultrasound demonstrated evidence of patellar tendinitis without rupture or patellar fracture, as well as soft tissue swelling. Differential includes occult fracture, meniscal injury and/or collateral ligament injury although examination is reassuring. Ligamentous structures appears stable with no pain on palpation or with stressing. Reviewed treatment options inclusive of pain control, activity modification, bracing and formal physical therapy. Also reviewed timing of advance imaging (ie MRI).     At this time, will proceed with the following plan:  - Rest, compression sleeve, hinged knee brace and weight-bearing as tolerated with crutches   - Avoid activities that provoke pain  - Gentle range of motion (bending and straightening) of the knee frequently throughout the day to prevent stiffness.  - Icing for 10-15 minutes 3-4 times per day  - Tylenol 1000 mg up to 3 times per day for pain  - Work note provided releasing from work until 2 week follow up    Please schedule a follow up appointment to see me in 2 weeks, or sooner as needed for persistence or worsening of pain. You may call our direct clinic number ([phone]) at any time with questions or concerns.    Teresa Villasenor  MD [place], [facility]    -----    SUBJECTIVE  Vikki Medina is a/an 56 year old female who is seen as an Urgent Care referral for evaluation of acute left knee pain (Work Comp). The patient is seen with their daughter.    Onset: 21, 5 day(s) ago. Patient describes injury as trip and fall onto the anterior knee at work. She reports she fell forward landing on the left knee and noted immediate pain over the anterior knee. She was seen in Urgent Care the following day where XR was negative, exam demonstrated effusion and concern for contusion. She was provided with a knee immobilizer and Norco which she did not tolerate well and thus stopped taking. She has been unable to return to work since that time due to persistence of pain, swelling and stiffness of the knee. She has been using a cane to assist with ambulation. No instability or mechanical symptoms although she feels that she can't trust the knee.  Location of Pain: left knee pain located anteroirly  Rating of Pain at worst: 9/10  Rating of Pain Currently: 7/10  Worsened by: ascending stairs> descending, walking or standing for prolonged periods of time, increased pain at nighttime causing difficulties falling and staying asleep.   Better with: rest  Treatments tried: Nocro prescription from : only took 2 doses, Tylenol, compression sleeve.   Associated symptoms: improved swelling. Bruising over the proximal patellar tendon.   Orthopedic history: YES - Date: right knee DJD   Relevant surgical history: NO  Social history: Works for [employer] as .     Past Medical History:   Diagnosis Date     Delivery normal     , no complications     Third degree hemorrhoids 2021     Social History     Socioeconomic History     Marital status:      Spouse name: Not on file     Number of children: 5     Years of education: Not on file     Highest education level: Not on file   Occupational  History     Occupation: customer service     Comment: [employer]   Tobacco Use     Smoking status: Former Smoker     Types: Cigarettes     Quit date: 1/22/2018     Years since quitting: 3.9     Smokeless tobacco: Never Used     Tobacco comment: 4-5 per day   Vaping Use     Vaping Use: Never used   Substance and Sexual Activity     Alcohol use: No     Alcohol/week: 0.0 standard drinks     Drug use: No     Sexual activity: Yes     Partners: Male     Birth control/protection: Surgical   Other Topics Concern      Service No     Blood Transfusions No     Caffeine Concern Yes     Comment: 2 per day     Occupational Exposure Not Asked     Hobby Hazards Not Asked     Sleep Concern Not Asked     Stress Concern Not Asked     Weight Concern Not Asked     Special Diet Yes     Comment: dairy per day 0-1     Back Care Not Asked     Exercise No     Comment: just joined [employer]     Bike Helmet Not Asked     Seat Belt Yes     Self-Exams Yes     Parent/sibling w/ CABG, MI or angioplasty before 65F 55M? No   Social History Narrative     Not on file     Social Determinants of Health     Financial Resource Strain: Not on file   Food Insecurity: Not on file   Transportation Needs: Not on file   Physical Activity: Not on file   Stress: Not on file   Social Connections: Not on file   Intimate Partner Violence: Not on file   Housing Stability: Not on file     Patient's past medical, surgical, social, and family histories were reviewed today and no changes are noted.    REVIEW OF SYSTEMS:  10 point ROS is negative other than symptoms noted above in HPI, Past Medical History or as stated below  Constitutional: NEGATIVE for fever, chills, change in weight  Skin: NEGATIVE for worrisome rashes, moles or lesions  GI/: NEGATIVE for bowel or bladder changes  Neuro: NEGATIVE for weakness, dizziness or paresthesias    OBJECTIVE:  /72 (BP Location: Right arm, Patient Position: Chair, Cuff Size: Adult Regular)    General: healthy,  alert and in no distress  HEENT: no scleral icterus or conjunctival erythema  Skin: no suspicious lesions or rash. No jaundice.  CV: no pedal edema  Resp: normal respiratory effort without conversational dyspnea   Psych: normal mood and affect  Gait: normal steady gait with appropriate coordination and balance  Neuro: Normal light sensory exam of lower extremity  MSK:  LEFT KNEE  Inspection:    normal alignment  Palpation:    Tender about the medial patellar facet, inferior pole patella and proxima patella tendon (with associated small overlying bruise), distal patellar tendon and tibial tubercle, and medial joint line. Remainder of bony and ligamentous landmarks are nontender.    Moderate effusion is present    Patellofemoral crepitus is Present  Range of Motion:     00 extension to 900 flexion  Strength:    Quadriceps 5/5 and hamstrings 4+/5    Extensor mechanism intact  Special Tests:    Positive: Patellar grind, Julio Cesar's (pain without click, limited due to guarding)    Negative: MCL/valgus stress (0 & 30 deg), LCL/varus stress (0 & 30 deg), Lachman's, anterior drawer, posterior drawer    Independent visualization of the below image:    XR LEFT KNEE THREE VIEWS   12/27/2021 2:36 PM                                                    IMPRESSION: Mild to moderate tricompartmental degenerative change. No  evidence of acute fracture. Prominent tibial tubercle. Large joint  effusion.  MD Teresa LEA MD, [facility]        Again, thank you for allowing me to participate in the care of your patient.        Sincerely,        Teresa Mcpherson MD

## 2021-12-31 NOTE — LETTER
[facility] [place] [id number] [address]  Phone: [phone]  Fax: [phone]    December 31, 2021        Vikki Medina  [address] [place] [address]        To Whom It May Concern:    RE: Vikki Medina    Patient was evaluated and treated today at our clinic for a left knee injury sustained at work on 12/26/2021. Please release her from work for the next 2.5 weeks to allow for healing. I will see her back in clinic in 2 weeks (January 14, 2022) at which time her restrictions will be re-evaluated.     Please contact me for questions or concerns.      Sincerely,        Teresa Mcpherson MD

## 2022-01-14 ENCOUNTER — OFFICE VISIT (OUTPATIENT)
Dept: ORTHOPEDICS | Facility: CLINIC | Age: 57
End: 2022-01-14
Payer: OTHER MISCELLANEOUS

## 2022-01-14 VITALS
BODY MASS INDEX: 36.44 KG/M2 | WEIGHT: 193 LBS | HEIGHT: 61 IN | DIASTOLIC BLOOD PRESSURE: 80 MMHG | SYSTOLIC BLOOD PRESSURE: 134 MMHG

## 2022-01-14 DIAGNOSIS — M17.12 PRIMARY OSTEOARTHRITIS OF LEFT KNEE: ICD-10-CM

## 2022-01-14 DIAGNOSIS — S80.02XD CONTUSION OF LEFT KNEE, SUBSEQUENT ENCOUNTER: ICD-10-CM

## 2022-01-14 DIAGNOSIS — M25.562 ACUTE PAIN OF LEFT KNEE: Primary | ICD-10-CM

## 2022-01-14 PROCEDURE — 99214 OFFICE O/P EST MOD 30 MIN: CPT | Performed by: STUDENT IN AN ORGANIZED HEALTH CARE EDUCATION/TRAINING PROGRAM

## 2022-01-14 ASSESSMENT — MIFFLIN-ST. JEOR: SCORE: 1412.82

## 2022-01-14 NOTE — LETTER
1/14/2022         RE: Vikki Medina  [address] [place] [address]        Dear Colleague,    Thank you for referring your patient, Vikki Medina, to the [facility] SP[facility]TS MEDICINE CLINIC [place]. Please see a copy of my visit note below.    ASSESSMENT & PLAN    1. Acute pain of left knee    2. Contusion of left knee, subsequent encounter    3. Primary osteoarthritis of left knee      Vikki Medina is a 56 year old female presenting for follow up of acute anterior left knee pain after a fall at work on 12/26/21 (). Evaluation today is again consistent with anterior knee contusion with possible bone bruise and osteoarthritis flare. Reviewed treatment options inclusive of pain control, activity modification, bracing and formal physical therapy. Also reviewed timing of advance imaging (ie MRI).      At this time, will proceed with the following plan:  - Rest, compression sleeve, weight-bearing as tolerated, wean the cane  - Awaiting insurance authorization for hinged knee brace. Orthotics referral placed at last visit.  - Avoid activities that provoke pain  - Gentle range of motion (bending and straightening) of the knee frequently throughout the day to prevent stiffness.  - Referral placed for physical therapy to work on knee and hip strengthening and mobility.  - Heat or ice as needed for comfort  - Tylenol 1000 mg up to 3 times per day for pain  - Ok to try topical Voltaren (diclofenac) gel over the painful part of the knee 1-2 times per day  - Work note provided, stay off of work for an additional 2 weeks     Please schedule a follow up appointment to see me in 2 weeks (1/28/22), or sooner as needed for persistence or worsening of pain. You may call our direct clinic number ([phone]) at any time with questions or concerns.    Teresa Mcpherson MD, CAQSM  [facility] Sports and Orthopedic Care    -----    SUBJECTIVE:  Vikki Medina is a 56  "year old female who is seen in follow-up for left knee pain.They were last seen 12/31/2021.     Since their last visit reports 60% improvement. She still notes pain is aggravated by prolonged walking (needs assistance of cane) as well as going up and down stairs. They have tried rest/activity avoidance, ice (causes stiffness), heat (provides some relief), Tylenol 1000mg BID, previous imaging (xray 12/27/21) and cane.      The patient is seen by themselves.    Patient's past medical, surgical, social, and family histories were reviewed today and no changes are noted.    REVIEW OF SYSTEMS:  Constitutional: NEGATIVE for fever, chills, change in weight  Skin: NEGATIVE for worrisome rashes, moles or lesions  GI/: NEGATIVE for bowel or bladder changes  Neuro: NEGATIVE for weakness, dizziness or paresthesias    OBJECTIVE:  /80   Ht 1.549 m (5' 1\")   Wt 87.5 kg (193 lb)   BMI 36.47 kg/m     General: healthy, alert and in no distress  HEENT: no scleral icterus or conjunctival erythema  Skin: no suspicious lesions or rash. No jaundice.  CV: no pedal edema  Resp: normal respiratory effort without conversational dyspnea   Psych: normal mood and affect  Gait: antlagic, using cane for assistance, appropriate coordination and balance  Neuro: Normal light sensory exam of lower extremity  MSK:  LEFT KNEE  Inspection:    normal alignment without bruising, ecchymosis or swelling  Palpation:    Tender about the medial patellar facet, inferior pole patella and proximal patella tendon, distal patellar tendon, and medial joint line. Remainder of bony and ligamentous landmarks are nontender.    Small effusion is present    Patellofemoral crepitus is present  Range of Motion:     00 extension to 900 flexion  Strength:    Quadriceps 5/5 and hamstrings 5-/5    Extensor mechanism intact  Special Tests:    Positive: Patellar grind    Negative: MCL/valgus stress (0 & 30 deg), LCL/varus stress (0 & 30 deg), Lachman's, anterior drawer, " posterior drawer, McMurrays    Independent visualization of the below image:  Results for orders placed or performed in visit on 12/27/21   XR Knee Left 3 Views    Narrative    XR LEFT KNEE THREE VIEWS   12/27/2021 2:36 PM     HISTORY: Fell forward onto knee one day ago. Acute pain of left knee.    COMPARISON: None.      Impression    IMPRESSION: Mild to moderate tricompartmental degenerative change. No  evidence of acute fracture. Prominent tibial tubercle. Large joint  effusion.    BI SALIANS MD         SYSTEM ID:  [id number]         Teresa Mcpherson MD, [facility] Sports and Orthopedic Care              Again, thank you for allowing me to participate in the care of your patient.        Sincerely,        Teresa Mcpherson MD

## 2022-01-14 NOTE — PATIENT INSTRUCTIONS
1. Acute pain of left knee    2. Contusion of left knee, subsequent encounter    3. Primary osteoarthritis of left knee      Vikki Medina is a 56 year old female presenting for follow up of acute anterior left knee pain after a fall at work on 12/26/21 (). Evaluation today is again consistent with anterior knee contusion with possible bone bruise and osteoarthritis flare. Reviewed treatment options inclusive of pain control, activity modification, bracing and formal physical therapy. Also reviewed timing of advance imaging (ie MRI).      At this time, will proceed with the following plan:  - Rest, compression sleeve, weight-bearing as tolerated, wean the cane  - Awaiting insurance authorization for hinged knee brace. Orthotics referral placed at last visit.  - Avoid activities that provoke pain  - Gentle range of motion (bending and straightening) of the knee frequently throughout the day to prevent stiffness.  - Referral placed for physical therapy to work on knee and hip strengthening and mobility.  - Heat or ice as needed for comfort  - Tylenol 1000 mg up to 3 times per day for pain  - Ok to try topical Voltaren (diclofenac) gel over the painful part of the knee 1-2 times per day  - Work note provided, stay off of work for an additional 2 weeks     Please schedule a follow up appointment to see me in 2 weeks (1/28/22), or sooner as needed for persistence or worsening of pain. You may call our direct clinic number ([phone]) at any time with questions or concerns.    Teresa Mcpherson MD, [facility] Sports and Orthopedic [facility]

## 2022-01-14 NOTE — PROGRESS NOTES
ASSESSMENT & PLAN    1. Acute pain of left knee    2. Contusion of left knee, subsequent encounter    3. Primary osteoarthritis of left knee      Vikki Medina is a 56 year old female presenting for follow up of acute anterior left knee pain after a fall at work on 12/26/21 (). Evaluation today is again consistent with anterior knee contusion with possible bone bruise and osteoarthritis flare. Reviewed treatment options inclusive of pain control, activity modification, bracing and formal physical therapy. Also reviewed timing of advance imaging (ie MRI).      At this time, will proceed with the following plan:  - Rest, compression sleeve, weight-bearing as tolerated, wean the cane  - Awaiting insurance authorization for hinged knee brace. Orthotics referral placed at last visit.  - Avoid activities that provoke pain  - Gentle range of motion (bending and straightening) of the knee frequently throughout the day to prevent stiffness.  - Referral placed for physical therapy to work on knee and hip strengthening and mobility.  - Heat or ice as needed for comfort  - Tylenol 1000 mg up to 3 times per day for pain  - Ok to try topical Voltaren (diclofenac) gel over the painful part of the knee 1-2 times per day  - Work note provided, stay off of work for an additional 2 weeks     Please schedule a follow up appointment to see me in 2 weeks (1/28/22), or sooner as needed for persistence or worsening of pain. You may call our direct clinic number ([phone]) at any time with questions or concerns.    Teresa Mcpherson MD, [facility] Sports and Orthopedic Care    -----    SUBJECTIVE:  Vikki Medina is a 56 year old female who is seen in follow-up for left knee pain.They were last seen 12/31/2021.     Since their last visit reports 60% improvement. She still notes pain is aggravated by prolonged walking (needs assistance of cane) as well as going up and down stairs. They have tried  "rest/activity avoidance, ice (causes stiffness), heat (provides some relief), Tylenol 1000mg BID, previous imaging (xray 12/27/21) and cane.      The patient is seen by themselves.    Patient's past medical, surgical, social, and family histories were reviewed today and no changes are noted.    REVIEW OF SYSTEMS:  Constitutional: NEGATIVE for fever, chills, change in weight  Skin: NEGATIVE for worrisome rashes, moles or lesions  GI/: NEGATIVE for bowel or bladder changes  Neuro: NEGATIVE for weakness, dizziness or paresthesias    OBJECTIVE:  /80   Ht 1.549 m (5' 1\")   Wt 87.5 kg (193 lb)   BMI 36.47 kg/m     General: healthy, alert and in no distress  HEENT: no scleral icterus or conjunctival erythema  Skin: no suspicious lesions or rash. No jaundice.  CV: no pedal edema  Resp: normal respiratory effort without conversational dyspnea   Psych: normal mood and affect  Gait: antlagic, using cane for assistance, appropriate coordination and balance  Neuro: Normal light sensory exam of lower extremity  MSK:  LEFT KNEE  Inspection:    normal alignment without bruising, ecchymosis or swelling  Palpation:    Tender about the medial patellar facet, inferior pole patella and proximal patella tendon, distal patellar tendon, and medial joint line. Remainder of bony and ligamentous landmarks are nontender.    Small effusion is present    Patellofemoral crepitus is present  Range of Motion:     00 extension to 900 flexion  Strength:    Quadriceps 5/5 and hamstrings 5-/5    Extensor mechanism intact  Special Tests:    Positive: Patellar grind    Negative: MCL/valgus stress (0 & 30 deg), LCL/varus stress (0 & 30 deg), Lachman's, anterior drawer, posterior drawer, McMurrays    Independent visualization of the below image:  Results for orders placed or performed in visit on 12/27/21   XR Knee Left 3 Views    Narrative    XR LEFT KNEE THREE VIEWS   12/27/2021 2:36 PM     HISTORY: Fell forward onto knee one day ago. Acute " pain of left knee.    COMPARISON: None.      Impression    IMPRESSION: Mild to moderate tricompartmental degenerative change. No  evidence of acute fracture. Prominent tibial tubercle. Large joint  effusion.    BI SALINAS MD         SYSTEM ID:  [id number]         Teresa Mcpherson MD, [facility] Sports and Orthopedic [facility]

## 2022-01-14 NOTE — LETTER
[facility] SPORTS MEDICINE CLINIC [place] [id number] [address] [address]  Phone: [phone]  Fax: [phone]    January 14, 2022        Vikki Medina  [address] [place] [address]        To Whom It May Concern:    RE: Vikki Medina    Patient was evaluated and treated today at our clinic for a left knee injury sustained at work on 12/26/2021. Please release her from work for the next 2 weeks (through 01/28/2022) to allow for ongoing healing. I will see her back in clinic on 01/28/2022 at which time her restrictions will be re-evaluated.     Please contact me for questions or concerns.      Sincerely,        Teresa Mcpherson MD

## 2022-01-26 ENCOUNTER — THERAPY VISIT (OUTPATIENT)
Dept: PHYSICAL THERAPY | Facility: CLINIC | Age: 57
End: 2022-01-26
Attending: STUDENT IN AN ORGANIZED HEALTH CARE EDUCATION/TRAINING PROGRAM
Payer: OTHER MISCELLANEOUS

## 2022-01-26 DIAGNOSIS — S80.02XD CONTUSION OF LEFT KNEE, SUBSEQUENT ENCOUNTER: ICD-10-CM

## 2022-01-26 DIAGNOSIS — M25.562 ACUTE PAIN OF LEFT KNEE: ICD-10-CM

## 2022-01-26 DIAGNOSIS — M17.12 PRIMARY OSTEOARTHRITIS OF LEFT KNEE: ICD-10-CM

## 2022-01-26 PROCEDURE — 97110 THERAPEUTIC EXERCISES: CPT | Mod: GP | Performed by: PHYSICAL THERAPIST

## 2022-01-26 PROCEDURE — 97161 PT EVAL LOW COMPLEX 20 MIN: CPT | Mod: GP | Performed by: PHYSICAL THERAPIST

## 2022-01-26 ASSESSMENT — ACTIVITIES OF DAILY LIVING (ADL)
PAIN: THE SYMPTOM AFFECTS MY ACTIVITY MODERATELY
STAND: ACTIVITY IS MINIMALLY DIFFICULT
SWELLING: I DO NOT HAVE THE SYMPTOM
HOW_WOULD_YOU_RATE_THE_CURRENT_FUNCTION_OF_YOUR_KNEE_DURING_YOUR_USUAL_DAILY_ACTIVITIES_ON_A_SCALE_FROM_0_TO_100_WITH_100_BEING_YOUR_LEVEL_OF_KNEE_FUNCTION_PRIOR_TO_YOUR_INJURY_AND_0_BEING_THE_INABILITY_TO_PERFORM_ANY_OF_YOUR_USUAL_DAILY_ACTIVITIES?: 50
KNEEL ON THE FRONT OF YOUR KNEE: I AM UNABLE TO DO THE ACTIVITY
WALK: ACTIVITY IS MINIMALLY DIFFICULT
KNEE_ACTIVITY_OF_DAILY_LIVING_SCORE: 48.57
HOW_WOULD_YOU_RATE_THE_OVERALL_FUNCTION_OF_YOUR_KNEE_DURING_YOUR_USUAL_DAILY_ACTIVITIES?: ABNORMAL
GO DOWN STAIRS: ACTIVITY IS FAIRLY DIFFICULT
KNEE_ACTIVITY_OF_DAILY_LIVING_SUM: 34
SIT WITH YOUR KNEE BENT: I AM UNABLE TO DO THE ACTIVITY
AS_A_RESULT_OF_YOUR_KNEE_INJURY,_HOW_WOULD_YOU_RATE_YOUR_CURRENT_LEVEL_OF_DAILY_ACTIVITY?: ABNORMAL
RAW_SCORE: 34
LIMPING: THE SYMPTOM AFFECTS MY ACTIVITY MODERATELY
SQUAT: ACTIVITY IS MINIMALLY DIFFICULT
GO UP STAIRS: ACTIVITY IS FAIRLY DIFFICULT
GIVING WAY, BUCKLING OR SHIFTING OF KNEE: THE SYMPTOM AFFECTS MY ACTIVITY MODERATELY
STIFFNESS: THE SYMPTOM AFFECTS MY ACTIVITY MODERATELY
RISE FROM A CHAIR: ACTIVITY IS SOMEWHAT DIFFICULT
WEAKNESS: THE SYMPTOM AFFECTS MY ACTIVITY MODERATELY

## 2022-01-26 NOTE — PROGRESS NOTES
Physical Therapy Initial Evaluation  Subjective:  Suffered fall while walking to work (at [employer]) 12/26/21. Fell forward and landed on chest and knees. Was able to get up on 2nd try, walk but next day very painful L anterior/medial knee. Off work since then. WORSE pain with stairs (both ways, using handrails can ascend reciprocally, descend goes sideways and 1 at a time), feels weaker, longer than 1 hour walk. BETTER with OTC knee brace, CBD oil. Goal is to get back to work, get leg stronger.X-rays negative for fx, has OA. Does have R knee OA so wears a brace for that.     The history is provided by the patient.   Patient Health History         Pain is reported as 5/10 on pain scale.  General health as reported by patient is fair.  Pertinent medical history includes: diabetes and high blood pressure.     Medical allergies: sulfa drugs.   Surgeries include:  None.    Current medications:  High blood pressure medication.    Current occupation is baggage, [employer].                                       Objective:    Gait:    Gait Type:  Normal                                                           Knee Evaluation:  ROM:  Strength wnl knee: unable to do active SLR L, too difficult not painful, B hip abductor strength 3/5.   AROM    Hyperextension:  Left:  3    Right: 3  Extension:  Left: 0    Right:  0  Flexion: Left: 115, end range pain    Right: 120-125        Strength:         Quad Set Left:  Poor    Pain: +/-   Quad Set Right:  Good    Pain: -  Ligament Testing:  Not Assessed                Special Tests:   Left knee positive for the following special tests:  Patellar Compression  Left knee negative for the following special tests:  Meninscal    Right knee negative for the following special tests:  Meniscal and Patellar Compression  Palpation:  Palpation of knee: TTP L anterior medial general knee.      Edema:  Normal    Mobility Testing:  Normal            Functional Testing:          Quad:     Single Leg Squat:  Left:       Right:        Bilateral Leg Squat:  Pain ~ 60 deg  Excessive anterior knee excursion and moderate loss of control              General     ROS    Assessment/Plan:    Patient is a 56 year old female with left side knee complaints.    Patient has the following significant findings with corresponding treatment plan.                Diagnosis 1:  L knee pain  Pain -  hot/cold therapy, manual therapy, splint/taping/bracing/orthotics, self management, education and directional preference exercise  Decreased ROM/flexibility - manual therapy and therapeutic exercise  Decreased strength - therapeutic exercise and therapeutic activities  Decreased proprioception - neuro re-education, therapeutic activities and home program  Inflammation - self management/home program  Impaired gait - gait training  Impaired muscle performance - neuro re-education  Decreased function - therapeutic activities  Impaired posture - neuro re-education    Therapy Evaluation Codes:   1) History comprised of:   Personal factors that impact the plan of care:      None.    Comorbidity factors that impact the plan of care are:      Diabetes and High blood pressure.     Medications impacting care: High blood pressure.  2) Examination of Body Systems comprised of:   Body structures and functions that impact the plan of care:      Knee.   Activity limitations that impact the plan of care are:      Squatting/kneeling, Stairs and Working.  3) Clinical presentation characteristics are:   Stable/Uncomplicated.  4) Decision-Making    Low complexity using standardized patient assessment instrument and/or measureable assessment of functional outcome.  Cumulative Therapy Evaluation is: Low complexity.    Previous and current functional limitations:  (See Goal Flow Sheet for this information)    Short term and Long term goals: (See Goal Flow Sheet for this information)     Communication ability:  Patient appears to be able to clearly  communicate and understand verbal and written communication and follow directions correctly.  Treatment Explanation - The following has been discussed with the patient:   RX ordered/plan of care  Anticipated outcomes  Possible risks and side effects  This patient would benefit from PT intervention to resume normal activities.   Rehab potential is good.    Frequency:  1 X week, once daily  Duration:  for 6 weeks  Discharge Plan:  Achieve all LTG.  Independent in home treatment program.  Reach maximal therapeutic benefit.    Please refer to the daily flowsheet for treatment today, total treatment time and time spent performing 1:1 timed codes.

## 2022-01-28 ENCOUNTER — OFFICE VISIT (OUTPATIENT)
Dept: ORTHOPEDICS | Facility: CLINIC | Age: 57
End: 2022-01-28
Payer: OTHER MISCELLANEOUS

## 2022-01-28 VITALS
HEIGHT: 61 IN | DIASTOLIC BLOOD PRESSURE: 86 MMHG | WEIGHT: 193 LBS | BODY MASS INDEX: 36.44 KG/M2 | SYSTOLIC BLOOD PRESSURE: 124 MMHG

## 2022-01-28 DIAGNOSIS — M25.562 ACUTE PAIN OF LEFT KNEE: Primary | ICD-10-CM

## 2022-01-28 DIAGNOSIS — M17.12 PRIMARY OSTEOARTHRITIS OF LEFT KNEE: ICD-10-CM

## 2022-01-28 DIAGNOSIS — S80.02XD CONTUSION OF LEFT KNEE, SUBSEQUENT ENCOUNTER: ICD-10-CM

## 2022-01-28 PROCEDURE — 99213 OFFICE O/P EST LOW 20 MIN: CPT | Performed by: STUDENT IN AN ORGANIZED HEALTH CARE EDUCATION/TRAINING PROGRAM

## 2022-01-28 ASSESSMENT — MIFFLIN-ST. JEOR: SCORE: 1412.82

## 2022-01-28 NOTE — PATIENT INSTRUCTIONS
1. Acute pain of left knee    2. Contusion of left knee, subsequent encounter    3. Primary osteoarthritis of left knee      Vikki Medina is a 56 year old female presenting for follow up of acute anterior left knee pain after a fall at work ([employer]) 4 weeks ago (DOI 12/26/21). Evaluation today is again consistent with anterior knee contusion with possible bone bruise and osteoarthritis flare. Reviewed treatment options inclusive of pain control, activity modification, bracing and formal physical therapy. Also reviewed timing of advance imaging (ie MRI).      At this time, will proceed with the following plan:  - Rest, compression sleeve, weight-bearing as tolerated  - Hinged knee brace through Orthotics Department. Referral was placed at her initial visit weeks ago but has not yet been approved by [employer]. We have not received a request for records. She will schedule this appointment and continue to try to get a hold of [employer] for approval.   - Please call to schedule you Orthotics appointment at [phone]  - Avoid activities that provoke pain  - Gentle range of motion (bending and straightening) of the knee frequently throughout the day to prevent stiffness.  - Continue physical therapy for knee and hip strengthening and mobility.  - Heat or ice as needed for comfort  - Tylenol 1000 mg up to 3 times per day for pain  - Topical Voltaren (diclofenac) gel over the painful part of the knee 1-2 times per day  - Work note provided     Please schedule a follow up appointment to see me in 2 weeks (2/11/22), or sooner as needed for persistence or worsening of pain. You may call our direct clinic number ([phone]) at any time with questions or concerns.    Teresa Mcpherson MD, [facility] Sports and Orthopedic Care

## 2022-01-28 NOTE — LETTER
1/28/2022         RE: Vikki Medina  [address] [place] [address]        Dear Colleague,    Thank you for referring your patient, Vikki Medina, to the [facility] SPORTS MEDICINE CLINIC [place]. Please see a copy of my visit note below.    ASSESSMENT & PLAN    1. Acute pain of left knee    2. Contusion of left knee, subsequent encounter    3. Primary osteoarthritis of left knee      Vikki Medina is a 56 year old female presenting for follow up of acute anterior left knee pain after a fall at work ([employer]) 4 weeks ago (DOI 12/26/21). Evaluation today is again consistent with anterior knee contusion with possible bone bruise and osteoarthritis flare. Reviewed treatment options inclusive of pain control, activity modification, bracing and formal physical therapy. Also reviewed timing of advance imaging (ie MRI).      At this time, will proceed with the following plan:  - Rest, compression sleeve, weight-bearing as tolerated  - Hinged knee brace through Orthotics Department. Referral was placed at her initial visit weeks ago but has not yet been approved by [employer]. We have not received a request for records. She will schedule this appointment and continue to try to get a hold of [employer] for approval.   - Please call to schedule you Orthotics appointment at [phone]  - Avoid activities that provoke pain  - Gentle range of motion (bending and straightening) of the knee frequently throughout the day to prevent stiffness.  - Continue physical therapy for knee and hip strengthening and mobility.  - Heat or ice as needed for comfort  - Tylenol 1000 mg up to 3 times per day for pain  - Topical Voltaren (diclofenac) gel over the painful part of the knee 1-2 times per day  - Work note provided     Please schedule a follow up appointment to see me in 2 weeks (2/11/22), or sooner as needed for persistence or worsening of pain. You may call our direct clinic number  "([phone]) at any time with questions or concerns.    Teresa Mcpherson MD, [facility]    -----    SUBJECTIVE:  Vikki Mdeina is a 56 year old female who is seen in follow-up for left knee pain.They were last seen 1/14/2022.     Since their last visit reports that she has noted approx 25% improvement. She was able to get in for her first PT appointment this week which was helpful. She has still not received Work [employer] approval to proceed with the medial  brace, unfortunately. Work Comp states that they have requested records from us, but we have not received any requests. The patient indicates that their current pain level is 5/10. They have tried rest/activity avoidance, Tylenol, home exercises and physical therapy (1 visits).      The patient is seen with their daughter.    Patient's past medical, surgical, social, and family histories were reviewed today and no changes are noted.    REVIEW OF SYSTEMS:  Constitutional: NEGATIVE for fever, chills, change in weight  Skin: NEGATIVE for worrisome rashes, moles or lesions  GI/: NEGATIVE for bowel or bladder changes  Neuro: NEGATIVE for weakness, dizziness or paresthesias    OBJECTIVE:  /86   Ht 1.549 m (5' 1\")   Wt 87.5 kg (193 lb)   BMI 36.47 kg/m     General: healthy, alert and in no distress  HEENT: no scleral icterus or conjunctival erythema  Skin: no suspicious lesions or rash. No jaundice.  CV: regular rhythm by palpation, no pedal edema  Resp: normal respiratory effort without conversational dyspnea   Psych: normal mood and affect  Gait: antalgic gait with appropriate coordination and balance  Neuro: normal light touch sensory exam of the extremities.    MSK:  LEFT KNEE  Inspection:    normal alignment without bruising, ecchymosis or swelling  Palpation:    Tender about the medial patellar facet, inferior pole patella, medial tibial plateau. Remainder of bony and ligamentous landmarks " are nontender.    Small effusion is present    Patellofemoral crepitus is present  Range of Motion:     00 extension to 1150 flexion  Strength:    Quadriceps 5-/5 and hamstrings 5-/5    Extensor mechanism intact        Teresa Mcpherson MD, [facility] Sports and Orthopedic Care              Again, thank you for allowing me to participate in the care of your patient.        Sincerely,        Teresa Mcpherson MD

## 2022-01-28 NOTE — LETTER
[facility] SPORTS MEDICINE CLINIC [address]  [address] [place] [address]  Phone: [phone]  Fax: [phone]    January 28, 2022        Vikki Medina  [address] [place] [address]        To Whom It May Concern:    RE: Vikki Medina    Patient was evaluated and treated today at our clinic for a left knee injury sustained at work on 12/26/2021. Please release her from work for the next 2 weeks (through 02/11/2022) to allow for ongoing healing. I will see her back in clinic on 02/11/2022 at which time her restrictions will be re-evaluated.     Please contact me for questions or concerns.      Sincerely,        Teresa Mcpherson MD, CAM  [facility] [facility]

## 2022-01-28 NOTE — PROGRESS NOTES
ASSESSMENT & PLAN    1. Acute pain of left knee    2. Contusion of left knee, subsequent encounter    3. Primary osteoarthritis of left knee      Vikki Medina is a 56 year old female presenting for follow up of acute anterior left knee pain after a fall at work (WC) 4 weeks ago (DOI 12/26/21). Evaluation today is again consistent with anterior knee contusion with possible bone bruise and osteoarthritis flare. Reviewed treatment options inclusive of pain control, activity modification, bracing and formal physical therapy. Also reviewed timing of advance imaging (ie MRI).      At this time, will proceed with the following plan:  - Rest, compression sleeve, weight-bearing as tolerated  - Hinged knee brace through Orthotics Department. Referral was placed at her initial visit weeks ago but has not yet been approved by Work Comp. We have not received a request for records. She will schedule this appointment and continue to try to get a hold of Work Comp for approval.   - Please call to schedule you Orthotics appointment at [phone]  - Avoid activities that provoke pain  - Gentle range of motion (bending and straightening) of the knee frequently throughout the day to prevent stiffness.  - Continue physical therapy for knee and hip strengthening and mobility.  - Heat or ice as needed for comfort  - Tylenol 1000 mg up to 3 times per day for pain  - Topical Voltaren (diclofenac) gel over the painful part of the knee 1-2 times per day  - Work note provided     Please schedule a follow up appointment to see me in 2 weeks (2/11/22), or sooner as needed for persistence or worsening of pain. You may call our direct clinic number ([phone]) at any time with questions or concerns.    eTresa Mcpherson MD, [facility] Sports and Orthopedic Care    -----    SUBJECTIVE:  Vikki Medina is a 56 year old female who is seen in follow-up for left knee pain.They were last seen 1/14/2022.     Since  "their last visit reports that she has noted approx 25% improvement. She was able to get in for her first PT appointment this week which was helpful. She has still not received Work Comp approval to proceed with the medial  brace, unfortunately. Work Comp states that they have requested records from us, but we have not received any requests. The patient indicates that their current pain level is 5/10. They have tried rest/activity avoidance, Tylenol, home exercises and physical therapy (1 visits).      The patient is seen with their daughter.    Patient's past medical, surgical, social, and family histories were reviewed today and no changes are noted.    REVIEW OF SYSTEMS:  Constitutional: NEGATIVE for fever, chills, change in weight  Skin: NEGATIVE for worrisome rashes, moles or lesions  GI/: NEGATIVE for bowel or bladder changes  Neuro: NEGATIVE for weakness, dizziness or paresthesias    OBJECTIVE:  /86   Ht 1.549 m (5' 1\")   Wt 87.5 kg (193 lb)   BMI 36.47 kg/m     General: healthy, alert and in no distress  HEENT: no scleral icterus or conjunctival erythema  Skin: no suspicious lesions or rash. No jaundice.  CV: regular rhythm by palpation, no pedal edema  Resp: normal respiratory effort without conversational dyspnea   Psych: normal mood and affect  Gait: antalgic gait with appropriate coordination and balance  Neuro: normal light touch sensory exam of the extremities.    MSK:  LEFT KNEE  Inspection:    normal alignment without bruising, ecchymosis or swelling  Palpation:    Tender about the medial patellar facet, inferior pole patella, medial tibial plateau. Remainder of bony and ligamentous landmarks are nontender.    Small effusion is present    Patellofemoral crepitus is present  Range of Motion:     00 extension to 1150 flexion  Strength:    Quadriceps 5-/5 and hamstrings 5-/5    Extensor mechanism intact        Teresa Mcpherson MD, CAM  [facility] Sports and Orthopedic " Care

## 2022-02-03 ENCOUNTER — THERAPY VISIT (OUTPATIENT)
Dept: PHYSICAL THERAPY | Facility: CLINIC | Age: 57
End: 2022-02-03
Payer: OTHER MISCELLANEOUS

## 2022-02-03 DIAGNOSIS — M25.562 ACUTE PAIN OF LEFT KNEE: ICD-10-CM

## 2022-02-03 DIAGNOSIS — M17.12 PRIMARY OSTEOARTHRITIS OF LEFT KNEE: ICD-10-CM

## 2022-02-03 DIAGNOSIS — S80.02XD CONTUSION OF LEFT KNEE, SUBSEQUENT ENCOUNTER: ICD-10-CM

## 2022-02-03 PROCEDURE — 97110 THERAPEUTIC EXERCISES: CPT | Mod: GP | Performed by: PHYSICAL THERAPY ASSISTANT

## 2022-02-11 ENCOUNTER — OFFICE VISIT (OUTPATIENT)
Dept: ORTHOPEDICS | Facility: CLINIC | Age: 57
End: 2022-02-11
Payer: OTHER MISCELLANEOUS

## 2022-02-11 VITALS
BODY MASS INDEX: 36.44 KG/M2 | HEIGHT: 61 IN | SYSTOLIC BLOOD PRESSURE: 136 MMHG | WEIGHT: 193 LBS | DIASTOLIC BLOOD PRESSURE: 80 MMHG

## 2022-02-11 DIAGNOSIS — M25.562 ACUTE PAIN OF LEFT KNEE: Primary | ICD-10-CM

## 2022-02-11 DIAGNOSIS — S80.02XD CONTUSION OF LEFT KNEE, SUBSEQUENT ENCOUNTER: ICD-10-CM

## 2022-02-11 DIAGNOSIS — M17.12 PRIMARY OSTEOARTHRITIS OF LEFT KNEE: ICD-10-CM

## 2022-02-11 PROCEDURE — 99213 OFFICE O/P EST LOW 20 MIN: CPT | Performed by: STUDENT IN AN ORGANIZED HEALTH CARE EDUCATION/TRAINING PROGRAM

## 2022-02-11 ASSESSMENT — MIFFLIN-ST. JEOR: SCORE: 1412.82

## 2022-02-11 NOTE — PROGRESS NOTES
ASSESSMENT & PLAN       1. Acute pain of left knee    2. Contusion of bone, left knee, subsequent encounter    3. Primary osteoarthritis of left knee      Vikki Medina is a 56 year old female presenting for follow up of acute anterior left knee pain after a fall at work (WC) 7 weeks ago (DOI 12/26/21). Evaluation today is again consistent with anterior knee contusion with suspected bone bruise versus occult fracture involving the patella and medial joint. Given minimal improvement in symptoms despite 7 weeks of conservative management, I recommend proceeding with an MRI of the left knee to investigate for occult fracture or internal derangement.   - Your left knee MRI has been ordered. You may call [phone] to schedule over the phone. Once you know the date of your MRI, please call my office and schedule a follow-up visit for 2 days after that to discuss results.  - Continue working on obtaining the hinged knee brace.   - Continue PT and kinesiotaping to offload the patellofemoral joint.   - Activity as tolerated based on pain.  - Work note provided extending restrictions for an additional 2 weeks until follow up for re-evaluation.    Unfortunately, the patient is having trouble reaching her Work Comp team to receive approval for treatment including bracing, PT and MRI which are medically necessary due to the injury she sustained while working on 12/26/21. We have yet to receive any Work Comp forms. I have included our fax number in today's work note in hopes that they will send the required forms to proceed with medically necessary care as it relates to this work-related injury.      Please schedule a follow up appointment to see me in 2 weeks, or sooner as needed for persistence or worsening of pain. You may call our direct clinic number ([phone]) at any time with questions or concerns.    Teresa Mcpherson MD, [facility] Sports and Orthopedic  "Care    -----    SUBJECTIVE:  Vikki Medina is a 56 year old female who is seen in follow-up for left knee pain.They were last seen 1/28/2022.     Since their last visit reports 0% - (About the same as last time). They indicate that their current pain level is 5/10. They have tried rest/activity avoidance, Tylenol, home exercises and physical therapy (1 visits so far). OF note, KT taping at her most recent PT appointment provided great relief. She is hoping to learn this modality from her physical therapist to be done at home.     The patient is seen with their daughter.    Patient's past medical, surgical, social, and family histories were reviewed today and no changes are noted.    REVIEW OF SYSTEMS:  Constitutional: NEGATIVE for fever, chills, change in weight  Skin: NEGATIVE for worrisome rashes, moles or lesions  GI/: NEGATIVE for bowel or bladder changes  Neuro: NEGATIVE for weakness, dizziness or paresthesias    OBJECTIVE:  /80   Ht 1.549 m (5' 1\")   Wt 87.5 kg (193 lb)   BMI 36.47 kg/m     General: healthy, alert and in no distress  HEENT: no scleral icterus or conjunctival erythema  Skin: no suspicious lesions or rash. No jaundice.  CV: regular rhythm by palpation, no pedal edema  Resp: normal respiratory effort without conversational dyspnea   Psych: normal mood and affect  Gait: antalgic gait with appropriate coordination and balance  Neuro: normal light touch sensory exam of the extremities.    MSK:  LEFT KNEE  Inspection:    normal alignment without bruising, ecchymosis or swelling  Palpation:    Tender about the lateral patellar facet, medial patellar facet, inferior pole patella, medial tibial plateau. Remainder of bony and ligamentous landmarks are nontender.    Small effusion is present    Patellofemoral crepitus is present  Range of Motion:     00 extension to 1150 flexion  Strength:    Quadriceps 5-/5 and hamstrings 5-/5    Extensor mechanism intact       XR LEFT KNEE THREE " VIEWS   12/27/2021 2:36 PM                                             IMPRESSION: Mild to moderate tricompartmental degenerative change. No  evidence of acute fracture. Prominent tibial tubercle. Large joint  effusion.  MD Teresa LEA MD, [facility] Sports and Orthopedic Care

## 2022-02-11 NOTE — LETTER
2/11/2022         RE: Vikki Medina  [address] [place] [address]        Dear Colleague,    Thank you for referring your patient, Vikki Medina, to the [facility] [place]. Please see a copy of my visit note below.    ASSESSMENT & PLAN       1. Acute pain of left knee    2. Contusion of bone, left knee, subsequent encounter    3. Primary osteoarthritis of left knee      Vikki Medina is a 56 year old female presenting for follow up of acute anterior left knee pain after a fall at work (WC) 7 weeks ago (DOI 12/26/21). Evaluation today is again consistent with anterior knee contusion with suspected bone bruise versus occult fracture involving the patella and medial joint. Given minimal improvement in symptoms despite 7 weeks of conservative management, I recommend proceeding with an MRI of the left knee to investigate for occult fracture or internal derangement.   - Your left knee MRI has been ordered. You may call [phone] to schedule over the phone. Once you know the date of your MRI, please call my office and schedule a follow-up visit for 2 days after that to discuss results.  - Continue working on obtaining the hinged knee brace.   - Continue PT and kinesiotaping to offload the patellofemoral joint.   - Activity as tolerated based on pain.  - Work note provided extending restrictions for an additional 2 weeks until follow up for re-evaluation.    Unfortunately, the patient is having trouble reaching her Work Comp team to receive approval for treatment including bracing, PT and MRI which are medically necessary due to the injury she sustained while working on 12/26/21. We have yet to receive any Work Comp forms. I have included our fax number in today's work note in hopes that they will send the required forms to proceed with medically necessary care as it relates to this work-related injury.      Please schedule a follow up appointment  "to see me in 2 weeks, or sooner as needed for persistence or worsening of pain. You may call our direct clinic number ([phone]) at any time with questions or concerns.    Teresa Mcpherson MD, [facility] Sports and Orthopedic Care    -----    SUBJECTIVE:  Vikki Medina is a 56 year old female who is seen in follow-up for left knee pain.They were last seen 1/28/2022.     Since their last visit reports 0% - (About the same as last time). They indicate that their current pain level is 5/10. They have tried rest/activity avoidance, Tylenol, home exercises and physical therapy (1 visits so far). OF note, KT taping at her most recent PT appointment provided great relief. She is hoping to learn this modality from her physical therapist to be done at home.     The patient is seen with their daughter.    Patient's past medical, surgical, social, and family histories were reviewed today and no changes are noted.    REVIEW OF SYSTEMS:  Constitutional: NEGATIVE for fever, chills, change in weight  Skin: NEGATIVE for worrisome rashes, moles or lesions  GI/: NEGATIVE for bowel or bladder changes  Neuro: NEGATIVE for weakness, dizziness or paresthesias    OBJECTIVE:  /80   Ht 1.549 m (5' 1\")   Wt 87.5 kg (193 lb)   BMI 36.47 kg/m     General: healthy, alert and in no distress  HEENT: no scleral icterus or conjunctival erythema  Skin: no suspicious lesions or rash. No jaundice.  CV: regular rhythm by palpation, no pedal edema  Resp: normal respiratory effort without conversational dyspnea   Psych: normal mood and affect  Gait: antalgic gait with appropriate coordination and balance  Neuro: normal light touch sensory exam of the extremities.    MSK:  LEFT KNEE  Inspection:    normal alignment without bruising, ecchymosis or swelling  Palpation:    Tender about the lateral patellar facet, medial patellar facet, inferior pole patella, medial tibial plateau. Remainder of bony and ligamentous " landmarks are nontender.    Small effusion is present    Patellofemoral crepitus is present  Range of Motion:     00 extension to 1150 flexion  Strength:    Quadriceps 5-/5 and hamstrings 5-/5    Extensor mechanism intact       XR LEFT KNEE THREE VIEWS   12/27/2021 2:36 PM                                             IMPRESSION: Mild to moderate tricompartmental degenerative change. No  evidence of acute fracture. Prominent tibial tubercle. Large joint  effusion.  MD Teresa LEA MD, [facility] Sports and Orthopedic Care            Again, thank you for allowing me to participate in the care of your patient.        Sincerely,        Teresa Mcpherson MD

## 2022-02-11 NOTE — LETTER
[facility] SPORTS MEDICINE CLINIC [place] [id number]  [address]  [facility] [address]  Phone: [phone]  Fax: [phone]    February 11, 2022        Vikki Medina  [address] [place] [address]        To Whom It May Concern:    RE: Vikki Medina    Patient was evaluated and treated today at our clinic for a left knee injury sustained at work on 12/26/2021. Please release her from work for the next 2 weeks (through 02/25/2022) to allow for ongoing healing. I will see her back in clinic at that time, at which time her restrictions will be re-evaluated.     Of note, we have not yet received Vikki's Work Comp forms and we have been unsuccessful in reaching her Work Comp team. Please fax these forms to my clinic (fax number [phone]) as soon as possible. Based on her evaluation today, it is medically necessary to move forward with hinged knee bracing and an MRI of the left knee due to persistent pain due to her fall at work on 12/26/21.     Please fax the necessary forms to us as soon as possible, and feel free to contact me at any time with questions or concerns.      Sincerely,        Teresa Mcpherson MD, [facility]

## 2022-02-11 NOTE — PATIENT INSTRUCTIONS
1. Acute pain of left knee    2. Contusion of bone, left knee, subsequent encounter    3. Primary osteoarthritis of left knee      Vikki Medina is a 56 year old female presenting for follow up of acute anterior left knee pain after a fall at work (WC) 7 weeks ago (DOI 12/26/21). Evaluation today is again consistent with anterior knee contusion with suspected bone bruise versus occult fracture of the lateral patellar facet. Given no improvement in symptoms despite 7 weeks of conservative management, I recommend proceeding with an MRI of the left knee to investigate for occult fracture or internal derangement.     - Your left knee MRI has been ordered. You may call [phone] to schedule over the phone. Once you know the date of your MRI, please call my office and schedule a follow-up visit for 2 days after that to discuss results.    - Work note provided extending restrictions for an additional 2 weeks until follow up for re-evaluation.    - Continue PT and kinesiotaping. Activity as tolerated based on pain.    Please schedule a follow up appointment to see me in 2 weeks, or sooner as needed for persistence or worsening of pain. You may call our direct clinic number ([phone]) at any time with questions or concerns.    Teresa Mcpherson MD, CAM  [facility] Sports and Orthopedic Care

## 2022-02-24 ENCOUNTER — OFFICE VISIT (OUTPATIENT)
Dept: ORTHOPEDICS | Facility: CLINIC | Age: 57
End: 2022-02-24
Payer: OTHER MISCELLANEOUS

## 2022-02-24 VITALS
HEIGHT: 61 IN | BODY MASS INDEX: 36.44 KG/M2 | SYSTOLIC BLOOD PRESSURE: 136 MMHG | DIASTOLIC BLOOD PRESSURE: 82 MMHG | WEIGHT: 193 LBS

## 2022-02-24 DIAGNOSIS — M25.562 ACUTE PAIN OF LEFT KNEE: Primary | ICD-10-CM

## 2022-02-24 DIAGNOSIS — S80.02XD CONTUSION OF LEFT KNEE, SUBSEQUENT ENCOUNTER: ICD-10-CM

## 2022-02-24 DIAGNOSIS — M17.12 PRIMARY OSTEOARTHRITIS OF LEFT KNEE: ICD-10-CM

## 2022-02-24 PROCEDURE — 99213 OFFICE O/P EST LOW 20 MIN: CPT | Performed by: STUDENT IN AN ORGANIZED HEALTH CARE EDUCATION/TRAINING PROGRAM

## 2022-02-24 NOTE — PROGRESS NOTES
ASSESSMENT & PLAN    1. Acute pain of left knee    2. Contusion of bone, left knee, subsequent encounter    3. Primary osteoarthritis of left knee      Vikki Medina is a 56 year old female presenting for follow up of acute anterior left knee pain after a fall at work (WC) 9 weeks ago (DOI 12/26/21). Evaluation remains consistent with anterior knee contusion with suspected bone bruise versus occult fracture involving the patella and medial joint. Given minimal improvement in symptoms despite several weeks of conservative management, I recommend proceeding with an MRI of the left knee to investigate for occult fracture or internal derangement. This was ordered at our last visit, still awaiting WC approval.    - Work comp has finally approved her hinged knee brace. Appointment tomorrow with Orthotics to be fitted.   - May continue to use the KT taping in addition to the knee brace if helpful for symptoms.  - Weight bearing/activity as tolerated based on pain.  - Continue dedicated home exercises from PT; recommend checking in with your physical therapist every couple of weeks.   - Your left knee MRI was previously ordered, awaiting  approval. When approved, you may call [phone] to schedule over the phone. Once you know the date of your MRI, please call my office and schedule a follow-up visit for 2 days after that to discuss results.  - Work note provided.    Please schedule a follow up appointment to see me in 2 weeks (on or before 3/10/22), or sooner as needed for persistence or worsening of pain. You may call our direct clinic number ([phone]) at any time with questions or concerns.    Teresa Mcpherson MD, [facility] Sports and Orthopedic Care    -----    SUBJECTIVE:  Vikki Medina is a 56 year old female who is seen in follow-up for left knee pain. They were last seen 2/11/2022.     Since their last visit reports 0% - (About the same as last time). Patient received  "notification of approval for the knee brace this morning and has an appointment scheduled for brace fitting tomorrow 2/25/22. She reports she is still waiting for approval of the MRI. They indicate that their current pain level is 5/10. They have tried rest/activity avoidance, Tylenol, home exercises and KT taping.      The patient is seen by themselves.    Patient's past medical, surgical, social, and family histories were reviewed today and no changes are noted.    REVIEW OF SYSTEMS:  Constitutional: NEGATIVE for fever, chills, change in weight  Skin: NEGATIVE for worrisome rashes, moles or lesions  GI/: NEGATIVE for bowel or bladder changes  Neuro: NEGATIVE for weakness, dizziness or paresthesias    OBJECTIVE:  /82   Ht 1.549 m (5' 1\")   Wt 87.5 kg (193 lb)   BMI 36.47 kg/m     General: healthy, alert and in no distress  HEENT: no scleral icterus or conjunctival erythema  Skin: no suspicious lesions or rash. No jaundice.  CV: regular rhythm by palpation, no pedal edema  Resp: normal respiratory effort without conversational dyspnea   Psych: normal mood and affect  Gait: antalgic gait with appropriate coordination and balance  Neuro: normal light touch sensory exam of the extremities.    MSK:  LEFT KNEE  Inspection:    normal alignment without bruising, ecchymosis or swelling  Palpation:    Tender about the lateral patellar facet, medial patellar facet, inferior pole patella, medial tibial plateau. Remainder of bony and ligamentous landmarks are nontender.    Small effusion is present    Patellofemoral crepitus is present  Range of Motion:     00 extension to 1150 flexion  Strength:    Quadriceps 5-/5 and hamstrings 5-/5    Extensor mechanism intact      Teresa Mcpherson MD, [facility] Sports and Orthopedic Care          "

## 2022-02-24 NOTE — LETTER
2/24/2022         RE: Vikki Medina  [address] [place] [place] [address]        Dear Colleague,    Thank you for referring your patient, Vikki Medina, to the [facility]ICINE [facility] [place]. Please see a copy of my visit note below.    ASSESSMENT & PLAN    1. Acute pain of left knee    2. Contusion of bone, left knee, subsequent encounter    3. Primary osteoarthritis of left knee      Vikki Medina is a 56 year old female presenting for follow up of acute anterior left knee pain after a fall at work (WC) 9 weeks ago (DOI 12/26/21). Evaluation remains consistent with anterior knee contusion with suspected bone bruise versus occult fracture involving the patella and medial joint. Given minimal improvement in symptoms despite several weeks of conservative management, I recommend proceeding with an MRI of the left knee to investigate for occult fracture or internal derangement. This was ordered at our last visit, still awaiting  approval.    - Work comp has finally approved her hinged knee brace. Appointment tomorrow with Orthotics to be fitted.   - May continue to use the KT taping in addition to the knee brace if helpful for symptoms.  - Weight bearing/activity as tolerated based on pain.  - Continue dedicated home exercises from PT; recommend checking in with your physical therapist every couple of weeks.   - Your left knee MRI was previously ordered, awaiting  approval. When approved, you may call [phone] to schedule over the phone. Once you know the date of your MRI, please call my office and schedule a follow-up visit for 2 days after that to discuss results.  - Work note provided.    Please schedule a follow up appointment to see me in 2 weeks (on or before 3/10/22), or sooner as needed for persistence or worsening of pain. You may call our direct clinic number ([phone]) at any time with questions or concerns.    Teresa Mcpherson MD,  "CAQSM  [facility] Sports and Orthopedic Care    -----    SUBJECTIVE:  Vikki Median is a 56 year old female who is seen in follow-up for left knee pain. They were last seen 2/11/2022.     Since their last visit reports 0% - (About the same as last time). Patient received notification of approval for the knee brace this morning and has an appointment scheduled for brace fitting tomorrow 2/25/22. She reports she is still waiting for approval of the MRI. They indicate that their current pain level is 5/10. They have tried rest/activity avoidance, Tylenol, home exercises and KT taping.      The patient is seen by themselves.    Patient's past medical, surgical, social, and family histories were reviewed today and no changes are noted.    REVIEW OF SYSTEMS:  Constitutional: NEGATIVE for fever, chills, change in weight  Skin: NEGATIVE for worrisome rashes, moles or lesions  GI/: NEGATIVE for bowel or bladder changes  Neuro: NEGATIVE for weakness, dizziness or paresthesias    OBJECTIVE:  /82   Ht 1.549 m (5' 1\")   Wt 87.5 kg (193 lb)   BMI 36.47 kg/m     General: healthy, alert and in no distress  HEENT: no scleral icterus or conjunctival erythema  Skin: no suspicious lesions or rash. No jaundice.  CV: regular rhythm by palpation, no pedal edema  Resp: normal respiratory effort without conversational dyspnea   Psych: normal mood and affect  Gait: antalgic gait with appropriate coordination and balance  Neuro: normal light touch sensory exam of the extremities.    MSK:  LEFT KNEE  Inspection:    normal alignment without bruising, ecchymosis or swelling  Palpation:    Tender about the lateral patellar facet, medial patellar facet, inferior pole patella, medial tibial plateau. Remainder of bony and ligamentous landmarks are nontender.    Small effusion is present    Patellofemoral crepitus is present  Range of Motion:     00 extension to 1150 flexion  Strength:    Quadriceps 5-/5 and " hamstrings 5-/5    Extensor mechanism intact      Teresa Mcpherson MD, [facility] Sports and Orthopedic Care              Again, thank you for allowing me to participate in the care of your patient.        Sincerely,        Teresa Mcpherson MD

## 2022-02-24 NOTE — PATIENT INSTRUCTIONS
1. Acute pain of left knee    2. Contusion of bone, left knee, subsequent encounter    3. Primary osteoarthritis of left knee      Vikki Medina is a 56 year old female presenting for follow up of acute anterior left knee pain after a fall at work (WC) 9 weeks ago (DOI 12/26/21). Evaluation remains consistent with anterior knee contusion with suspected bone bruise versus occult fracture involving the patella and medial joint. Given minimal improvement in symptoms despite several weeks of conservative management, I recommend proceeding with an MRI of the left knee to investigate for occult fracture or internal derangement. This was ordered at our last visit, still awaiting  approval.    - Work comp has finally approved her hinged knee brace. Appointment tomorrow with Orthotics to be fitted.   - May continue to use the KT taping in addition to the knee brace if helpful for symptoms.  - Weight bearing/activity as tolerated based on pain.  - Continue dedicated home exercises from PT; recommend checking in with your physical therapist every couple of weeks.   - Your left knee MRI was previously ordered, awaiting  approval. When approved, you may call [phone] to schedule over the phone. Once you know the date of your MRI, please call my office and schedule a follow-up visit for 2 days after that to discuss results.  - Work note provided.    Please schedule a follow up appointment to see me in 2 weeks (on or before 3/10/22), or sooner as needed for persistence or worsening of pain. You may call our direct clinic number ([phone]) at any time with questions or concerns.    Teresa Mcpherson MD, [facility]

## 2022-02-24 NOTE — LETTER
[facility] SP[facility] MEDIC[facility] CLINIC [place] [id number] [facility]  [address] [place] [id number]  Phone: [phone]  Fax: [phone]    February 24, 2022        Vikki Medina  [address] [place] [address]        To Whom It May Concern:    RE: Vikki Medina    Patient was evaluated and treated today at our clinic for a left knee injury sustained at work on 12/26/2021. Please release her from work for the next 2 weeks (through 03/10/2022) to allow for ongoing healing due to delays in her Worker's Comp approving medically-necessary physical therapy, knee bracing and MRI. I will see her back in clinic in 2 weeks, at which time her restrictions will be re-evaluated.     Please fax the necessary forms to us as soon as possible, and feel free to contact me at any time with questions or concerns.      Sincerely,        Teresa Mcpherson MD, CAQSM  [facility]

## 2022-03-02 NOTE — TELEPHONE ENCOUNTER
Panel Management Review      Patient has the following on her problem list:     Diabetes    ASA: Failed    Last A1C  Lab Results   Component Value Date    A1C 6.6 12/19/2014    A1C 6.1 10/10/2013     A1C tested: FAILED    Last LDL:    Lab Results   Component Value Date    CHOL 186 10/09/2013     Lab Results   Component Value Date    HDL 52 10/09/2013     Lab Results   Component Value Date     10/09/2013     Lab Results   Component Value Date    TRIG 115 10/09/2013     Lab Results   Component Value Date    CHOLHDLRATIO 3.6 10/09/2013     No results found for: NHDL    Is the patient on a Statin? NO             Is the patient on Aspirin? NO        Last three blood pressure readings:  BP Readings from Last 3 Encounters:   12/15/16 (!) 160/100   12/19/14 124/84   10/10/13 132/80       Date of last diabetes office visit: 10/25/2015     Tobacco History:     History   Smoking Status     Current Every Day Smoker     Types: Cigarettes   Smokeless Tobacco     Never Used     Comment: 4-5 per day           Composite cancer screening  Chart review shows that this patient is due/due soon for the following None  Summary:    Patient is due/failing the following:   A1C,creatinine, microalbumin,eye exam, foot exam, tsh, lipid monitoring.     Action needed:   Patient needs office visit for diabetes follow up.    Type of outreach:    Phone, left message for patient to call back.     Questions for provider review:    None                                                                                                                                    Ciara Cooper MA     Chart routed to Care Team .           Siliq Counseling:  I discussed with the patient the risks of Siliq including but not limited to new or worsening depression, suicidal thoughts and behavior, immunosuppression, malignancy, posterior leukoencephalopathy syndrome, and serious infections.  The patient understands that monitoring is required including a PPD at baseline and must alert us or the primary physician if symptoms of infection or other concerning signs are noted. There is also a special program designed to monitor depression which is required with Siliq.

## 2022-03-10 ENCOUNTER — OFFICE VISIT (OUTPATIENT)
Dept: ORTHOPEDICS | Facility: CLINIC | Age: 57
End: 2022-03-10
Payer: OTHER MISCELLANEOUS

## 2022-03-10 VITALS
HEIGHT: 61 IN | DIASTOLIC BLOOD PRESSURE: 76 MMHG | SYSTOLIC BLOOD PRESSURE: 118 MMHG | WEIGHT: 193 LBS | BODY MASS INDEX: 36.44 KG/M2

## 2022-03-10 DIAGNOSIS — S80.02XD CONTUSION OF LEFT KNEE, SUBSEQUENT ENCOUNTER: ICD-10-CM

## 2022-03-10 DIAGNOSIS — M17.12 PRIMARY OSTEOARTHRITIS OF LEFT KNEE: ICD-10-CM

## 2022-03-10 DIAGNOSIS — M25.562 ACUTE PAIN OF LEFT KNEE: Primary | ICD-10-CM

## 2022-03-10 PROCEDURE — 99213 OFFICE O/P EST LOW 20 MIN: CPT | Performed by: STUDENT IN AN ORGANIZED HEALTH CARE EDUCATION/TRAINING PROGRAM

## 2022-03-10 NOTE — LETTER
[facility] SPOR[facility] MEDICINE CLINIC [address]  [address] [place] [address]  Phone: [phone]  Fax: [phone]    March 10, 2022        Vikki Medina  [address] [place] [address]        To Whom It May Concern:    RE: Vikki Medina    Patient was evaluated and treated today at our clinic for a left knee injury sustained at work on 12/26/2021. She is cleared to return to work without restrictions on Monday March 14, 2022. She will return to clinic as needed if symptoms return.    Please feel free to contact me at any time with questions or concerns.      Sincerely,        Teresa Mcpherson MD, CAQSM  [facility]

## 2022-03-10 NOTE — PROGRESS NOTES
ASSESSMENT & PLAN    1. Acute pain of left knee    2. Bone contusion of left knee, subsequent encounter    3. Primary osteoarthritis of left knee      Vikki Medina is a 56 year old female presenting for follow up of acute anterior left knee pain after a fall at work (WC) 10.5 weeks ago (DOI 12/26/21).   - Symptoms significantly improved with rest, time, physical therapy, KT taping and hinged bracing.  - I would recommend going forward with the knee MRI as previously ordered when approved by Worker's Comp to determine extent of bone bruising vs occult fracture vs medial meniscus tear in the setting of mild arthritis. Pending MRI results, cortisone may be safe/appropriate.  - Continue home exercise program and PT.  - Continue weight bearing as tolerated by pain with hinged brace. Wean brace as symptoms improve.   - Work letter provided releasing to work without restrictions on Monday 3/14/22 as tolerated based on pain.    Please schedule a follow up appointment to see me as needed for persistence or worsening of pain. You may call our direct clinic number ([phone]) at any time with questions or concerns.    Teresa Mcpherson MD, [facility]    -----    SUBJECTIVE:  Vikki Medina is a 56 year old female who is seen in follow-up for left knee pain.They were last seen 2/24/2022.     Since their last visit reports pain has improved. She did receive the hinged knee brace which she wears a few hours each day. They indicate that their current pain level is 2/10. They have tried rest/activity avoidance, home exercises, casting/splinting/bracing and Tylenol.      The patient is seen with their daughter.    Patient's past medical, surgical, social, and family histories were reviewed today and no changes are noted.    REVIEW OF SYSTEMS:  Constitutional: NEGATIVE for fever, chills, change in weight  Skin: NEGATIVE for worrisome rashes, moles or lesions  GI/:  "NEGATIVE for bowel or bladder changes  Neuro: NEGATIVE for weakness, dizziness or paresthesias    OBJECTIVE:  /76   Ht 1.549 m (5' 1\")   Wt 87.5 kg (193 lb)   BMI 36.47 kg/m     General: healthy, alert and in no distress  HEENT: no scleral icterus or conjunctival erythema  Skin: no suspicious lesions or rash. No jaundice.  CV: regular rhythm by palpation, no pedal edema  Resp: normal respiratory effort without conversational dyspnea   Psych: normal mood and affect  Gait: normal steady gait with appropriate coordination and balance  Neuro: normal light touch sensory exam of the extremities.    MSK:  LEFT KNEE  Inspection:    normal alignment without bruising, ecchymosis or swelling  Palpation:    Tender about the medial patellar facet, medial tibial plateau, medial joint line. Remainder of bony and ligamentous landmarks are nontender.    Trace effusion is present    Patellofemoral crepitus is present  Range of Motion:     00 extension to 1150 flexion  Strength:    Quadriceps 5-/5 and hamstrings 5-/5    Extensor mechanism intact        Teresa Mcpherson MD, CAM  [facility]          "

## 2022-03-10 NOTE — PATIENT INSTRUCTIONS
1. Acute pain of left knee    2. Bone contusion of left knee, subsequent encounter    3. Primary osteoarthritis of left knee      - Symptoms significantly improved with rest, time, physical therapy, KT taping and medial  bracing.  - Continue to have tenderness over the medial tibial plateau suggestive of bone contusion, although this is improving and is now able to ambulate with minimal pain.  - Work letter provided releasing to work without restrictions on Monday 3/14/22.  - I would recommend going forward with the knee MRI as previously ordered when approved by Worker's Comp to determine extent of bone bruising vs medial meniscus tear in the setting of mild arthritis. Pending MRI results, cortisone may be safe/appropriate.    Please schedule a follow up appointment to see me as needed for persistence or worsening of pain. You may call our direct clinic number ([phone]) at any time with questions or concerns.    Teresa Mcpherson MD, [facility]

## 2022-03-10 NOTE — LETTER
[facility] SPOR[facility] MEDICINE CLINIC [address]  [address] [place] [address]  Phone: [phone]  Fax: [phone]    March 10, 2022        Vikki Medina  [address] [place] [address]        To Whom It May Concern:    RE: Vikki Medina    Patient was evaluated and treated today at our clinic for a left knee injury sustained at work on 12/26/2021. She is now cleared to return to work without restrictions. She will return to clinic as needed if symptoms return.    Please feel free to contact me at any time with questions or concerns.      Sincerely,        Teresa Mcpherson MD, CAQSM  [facility]

## 2022-03-10 NOTE — LETTER
3/10/2022         RE: Vikki Medina  [address] [place] [address]        Dear Colleague,    Thank you for referring your patient, Vikki Medina, to the [facility] [place]. Please see a copy of my visit note below.    ASSESSMENT & PLAN    1. Acute pain of left knee    2. Bone contusion of left knee, subsequent encounter    3. Primary osteoarthritis of left knee      Vikki Medina is a 56 year old female presenting for follow up of acute anterior left knee pain after a fall at work (WC) 10.5 weeks ago (DOI 12/26/21).   - Symptoms significantly improved with rest, time, physical therapy, KT taping and hinged bracing.  - I would recommend going forward with the knee MRI as previously ordered when approved by Worker's Comp to determine extent of bone bruising vs occult fracture vs medial meniscus tear in the setting of mild arthritis. Pending MRI results, cortisone may be safe/appropriate.  - Continue home exercise program and PT.  - Continue weight bearing as tolerated by pain with hinged brace. Wean brace as symptoms improve.   - Work letter provided releasing to work without restrictions on Monday 3/14/22 as tolerated based on pain.    Please schedule a follow up appointment to see me as needed for persistence or worsening of pain. You may call our direct clinic number ([phone]) at any time with questions or concerns.    Teresa Mcpherson MD, [facility]    -----    SUBJECTIVE:  Vikki Medina is a 56 year old female who is seen in follow-up for left knee pain.They were last seen 2/24/2022.     Since their last visit reports pain has improved. She did receive the hinged knee brace which she wears a few hours each day. They indicate that their current pain level is 2/10. They have tried rest/activity avoidance, home exercises, casting/splinting/bracing and Tylenol.      The patient is  "seen with their daughter.    Patient's past medical, surgical, social, and family histories were reviewed today and no changes are noted.    REVIEW OF SYSTEMS:  Constitutional: NEGATIVE for fever, chills, change in weight  Skin: NEGATIVE for worrisome rashes, moles or lesions  GI/: NEGATIVE for bowel or bladder changes  Neuro: NEGATIVE for weakness, dizziness or paresthesias    OBJECTIVE:  /76   Ht 1.549 m (5' 1\")   Wt 87.5 kg (193 lb)   BMI 36.47 kg/m     General: healthy, alert and in no distress  HEENT: no scleral icterus or conjunctival erythema  Skin: no suspicious lesions or rash. No jaundice.  CV: regular rhythm by palpation, no pedal edema  Resp: normal respiratory effort without conversational dyspnea   Psych: normal mood and affect  Gait: normal steady gait with appropriate coordination and balance  Neuro: normal light touch sensory exam of the extremities.    MSK:  LEFT KNEE  Inspection:    normal alignment without bruising, ecchymosis or swelling  Palpation:    Tender about the medial patellar facet, medial tibial plateau, medial joint line. Remainder of bony and ligamentous landmarks are nontender.    Trace effusion is present    Patellofemoral crepitus is present  Range of Motion:     00 extension to 1150 flexion  Strength:    Quadriceps 5-/5 and hamstrings 5-/5    Extensor mechanism intact        Teresa Mcpherson MD, [facility]              Again, thank you for allowing me to participate in the care of your patient.        Sincerely,        Teresa Mcpherson MD    "

## 2022-03-28 NOTE — PROGRESS NOTES
"Subjective:  HPI  Physical Exam                    Objective:  System    Physical Exam    General     ROS    Assessment/Plan:    DISCHARGE REPORT    Progress reporting period is from 1/23/22 to 2/3/22.      SUBJECTIVE  Subjective changes noted by patient:  Wants to get the brace that the MD ordered.     Current pain level is  4/10    Previous pain level was:   Initial Pain level: 5/10   Changes in function:  None     Adverse reaction to treatment or activity: None     OBJECTIVE  Changes noted in objective findings:  Patient has failed to return to therapy so current objective findings are unknown.  Objective: Trial of KI taping of the left knee.  Patient did get instant pain relief . \"I\" strip from the medial to the lateral across the patella.        "

## 2022-03-29 PROBLEM — M25.562 ACUTE PAIN OF LEFT KNEE: Status: RESOLVED | Noted: 2022-01-26 | Resolved: 2022-03-29

## 2022-03-29 PROBLEM — S80.02XD CONTUSION OF LEFT KNEE, SUBSEQUENT ENCOUNTER: Status: RESOLVED | Noted: 2022-01-26 | Resolved: 2022-03-29

## 2022-03-29 PROBLEM — M17.12 PRIMARY OSTEOARTHRITIS OF LEFT KNEE: Status: RESOLVED | Noted: 2022-01-26 | Resolved: 2022-03-29

## 2022-03-29 NOTE — PROGRESS NOTES
Subjective:  HPI  Physical Exam                    Objective:  System    Physical Exam    General     ROS    Assessment/Plan:    ASSESSMENT/PLAN  Updated problem list and treatment plan:   STG/LTGs have been met:  Yes (See Goal flow sheet completed today.)  Progress toward STG/LTGs have been made:  Yes (See Goal flow sheet completed today.)  Assessment of Progress: The patient has not returned to therapy. Current status is unknown.  Self Management Plans:  Patient has been instructed in a home treatment program.  Patient  has been instructed in self management of symptoms.    Vikki continues to require the following intervention to meet STG and LT's:  PT intervention is no longer required to meet STG/LTG.    Recommendations:  This patient is ready to be discharged from therapy and continue their home treatment program.    Please refer to the daily flowsheet for treatment today, total treatment time and time spent performing 1:1 timed codes.

## 2022-04-10 ENCOUNTER — HEALTH MAINTENANCE LETTER (OUTPATIENT)
Age: 57
End: 2022-04-10

## 2022-04-11 ENCOUNTER — ANCILLARY PROCEDURE (OUTPATIENT)
Dept: MAMMOGRAPHY | Facility: CLINIC | Age: 57
End: 2022-04-11
Attending: FAMILY MEDICINE
Payer: COMMERCIAL

## 2022-04-11 DIAGNOSIS — Z12.31 VISIT FOR SCREENING MAMMOGRAM: ICD-10-CM

## 2022-04-11 PROCEDURE — 77067 SCR MAMMO BI INCL CAD: CPT | Mod: TC | Performed by: RADIOLOGY

## 2022-04-11 PROCEDURE — 77063 BREAST TOMOSYNTHESIS BI: CPT | Mod: TC | Performed by: RADIOLOGY

## 2022-06-27 ENCOUNTER — LAB (OUTPATIENT)
Dept: LAB | Facility: CLINIC | Age: 57
End: 2022-06-27
Attending: FAMILY MEDICINE
Payer: COMMERCIAL

## 2022-06-27 DIAGNOSIS — Z20.822 ENCOUNTER FOR LABORATORY TESTING FOR COVID-19 VIRUS: ICD-10-CM

## 2022-06-27 PROCEDURE — U0005 INFEC AGEN DETEC AMPLI PROBE: HCPCS

## 2022-06-27 PROCEDURE — U0003 INFECTIOUS AGENT DETECTION BY NUCLEIC ACID (DNA OR RNA); SEVERE ACUTE RESPIRATORY SYNDROME CORONAVIRUS 2 (SARS-COV-2) (CORONAVIRUS DISEASE [COVID-19]), AMPLIFIED PROBE TECHNIQUE, MAKING USE OF HIGH THROUGHPUT TECHNOLOGIES AS DESCRIBED BY CMS-2020-01-R: HCPCS

## 2022-06-28 LAB — SARS-COV-2 RNA RESP QL NAA+PROBE: NEGATIVE

## 2022-07-08 ENCOUNTER — TELEPHONE (OUTPATIENT)
Dept: FAMILY MEDICINE | Facility: CLINIC | Age: 57
End: 2022-07-08

## 2022-07-08 NOTE — TELEPHONE ENCOUNTER
Patient Quality Outreach      Summary:    Patient has the following on her problem list/HM:     Diabetes    Last A1C:  Lab Results   Component Value Date    A1C 8.3 2021    A1C 8.6 10/29/2021    A1C 8.6 2021    A1C 7.3 03/10/2020       Last LDL:    Lab Results   Component Value Date     10/29/2021    LDL 76 2021       Is the patient on a Statin? Yes          Is the patient on Aspirin? Yes    Medications     HMG CoA Reductase Inhibitors     pravastatin (PRAVACHOL) 20 MG tablet       Salicylates     aspirin (ASPIRIN LOW DOSE) 81 MG EC tablet             Last three blood pressure readings:  BP Readings from Last 3 Encounters:   03/10/22 118/76   22 136/82   22 136/80          Tobacco Use      Smoking status: Former Smoker        Types: Cigarettes        Quit date: 2018        Years since quittin.4      Smokeless tobacco: Never Used      Tobacco comment: 4-5 per day          Hypertension   Last three blood pressure readings:  BP Readings from Last 3 Encounters:   03/10/22 118/76   22 136/82   22 136/80     Blood pressure: Passed    HTN Guidelines:  ? 139/89     Patient is due/failing the following:   Diabetes -  A1C, Eye Exam and Diabetic Follow-Up Visit  Hypertension -  Hypertension follow-up visit  Cervical Cancer Screening - PAP Needed  Physical  - Due after 10/29/22    Type of outreach:    Sent [employer] message.    Questions for provider review:    None                                                                                                                                     Lisa Magill, CMA       Chart routed to Care Team.

## 2022-07-15 DIAGNOSIS — E78.5 HYPERLIPIDEMIA WITH TARGET LDL LESS THAN 100: ICD-10-CM

## 2022-07-15 DIAGNOSIS — E11.65 TYPE 2 DIABETES MELLITUS WITH HYPERGLYCEMIA, WITHOUT LONG-TERM CURRENT USE OF INSULIN (H): Primary | ICD-10-CM

## 2022-07-15 DIAGNOSIS — E11.9 TYPE 2 DIABETES MELLITUS WITHOUT COMPLICATIONS (H): ICD-10-CM

## 2022-07-18 RX ORDER — GLIPIZIDE 10 MG/1
TABLET, FILM COATED, EXTENDED RELEASE ORAL
Qty: 180 TABLET | Refills: 0 | Status: SHIPPED | OUTPATIENT
Start: 2022-07-18 | End: 2022-08-09

## 2022-07-18 RX ORDER — PRAVASTATIN SODIUM 20 MG
TABLET ORAL
Qty: 90 TABLET | Refills: 1 | Status: SHIPPED | OUTPATIENT
Start: 2022-07-18 | End: 2022-08-09

## 2022-07-18 NOTE — TELEPHONE ENCOUNTER
Routing request to provider for review/approval because:  Labs not current: A1c, creatinine    Visit is up to date. RN will issue one time 90 day ronen refill. Please advise on labs.   Gautam LOYA RN

## 2022-07-28 NOTE — TELEPHONE ENCOUNTER
Patient Quality Outreach Summary      Summary:    Patient is due/failing the following:   Eye Exam and Diabetic Follow-Up Visit and Hypertension follow-up visit    Type of outreach:    Patient has read the MY CHART message regarding the health maintenance that is due.  Patient has a upcoming appointment scheduled on 08/09/22.      Questions for provider review:    None                                                                                                                    Lisa Magill, CMA

## 2022-07-30 ENCOUNTER — HEALTH MAINTENANCE LETTER (OUTPATIENT)
Age: 57
End: 2022-07-30

## 2022-08-09 ENCOUNTER — OFFICE VISIT (OUTPATIENT)
Dept: FAMILY MEDICINE | Facility: CLINIC | Age: 57
End: 2022-08-09
Payer: COMMERCIAL

## 2022-08-09 VITALS
HEART RATE: 84 BPM | BODY MASS INDEX: 36.15 KG/M2 | OXYGEN SATURATION: 97 % | TEMPERATURE: 98.1 F | SYSTOLIC BLOOD PRESSURE: 116 MMHG | RESPIRATION RATE: 20 BRPM | WEIGHT: 191.3 LBS | DIASTOLIC BLOOD PRESSURE: 78 MMHG

## 2022-08-09 DIAGNOSIS — I10 ESSENTIAL HYPERTENSION: ICD-10-CM

## 2022-08-09 DIAGNOSIS — Z00.00 ROUTINE GENERAL MEDICAL EXAMINATION AT A HEALTH CARE FACILITY: Primary | ICD-10-CM

## 2022-08-09 DIAGNOSIS — R53.83 OTHER FATIGUE: ICD-10-CM

## 2022-08-09 DIAGNOSIS — Z12.4 CERVICAL CANCER SCREENING: ICD-10-CM

## 2022-08-09 DIAGNOSIS — E66.01 MORBID OBESITY (H): ICD-10-CM

## 2022-08-09 DIAGNOSIS — E11.65 TYPE 2 DIABETES MELLITUS WITH HYPERGLYCEMIA, WITHOUT LONG-TERM CURRENT USE OF INSULIN (H): ICD-10-CM

## 2022-08-09 DIAGNOSIS — E78.5 HYPERLIPIDEMIA WITH TARGET LDL LESS THAN 100: ICD-10-CM

## 2022-08-09 DIAGNOSIS — N18.2 CKD (CHRONIC KIDNEY DISEASE) STAGE 2, GFR 60-89 ML/MIN: ICD-10-CM

## 2022-08-09 LAB
DEPRECATED CALCIDIOL+CALCIFEROL SERPL-MC: 33 UG/L (ref 20–75)
HBA1C MFR BLD: 8.4 % (ref 0–5.6)
VIT B12 SERPL-MCNC: 465 PG/ML (ref 232–1245)

## 2022-08-09 PROCEDURE — 36415 COLL VENOUS BLD VENIPUNCTURE: CPT | Performed by: PHYSICIAN ASSISTANT

## 2022-08-09 PROCEDURE — 82306 VITAMIN D 25 HYDROXY: CPT | Performed by: PHYSICIAN ASSISTANT

## 2022-08-09 PROCEDURE — 80061 LIPID PANEL: CPT | Performed by: PHYSICIAN ASSISTANT

## 2022-08-09 PROCEDURE — 80053 COMPREHEN METABOLIC PANEL: CPT | Performed by: PHYSICIAN ASSISTANT

## 2022-08-09 PROCEDURE — 99214 OFFICE O/P EST MOD 30 MIN: CPT | Mod: 25 | Performed by: PHYSICIAN ASSISTANT

## 2022-08-09 PROCEDURE — 99396 PREV VISIT EST AGE 40-64: CPT | Mod: 25 | Performed by: PHYSICIAN ASSISTANT

## 2022-08-09 PROCEDURE — G0145 SCR C/V CYTO,THINLAYER,RESCR: HCPCS | Performed by: PHYSICIAN ASSISTANT

## 2022-08-09 PROCEDURE — 82607 VITAMIN B-12: CPT | Performed by: PHYSICIAN ASSISTANT

## 2022-08-09 PROCEDURE — 90471 IMMUNIZATION ADMIN: CPT | Performed by: PHYSICIAN ASSISTANT

## 2022-08-09 PROCEDURE — 87624 HPV HI-RISK TYP POOLED RSLT: CPT | Performed by: PHYSICIAN ASSISTANT

## 2022-08-09 PROCEDURE — 83036 HEMOGLOBIN GLYCOSYLATED A1C: CPT | Performed by: PHYSICIAN ASSISTANT

## 2022-08-09 PROCEDURE — 90750 HZV VACC RECOMBINANT IM: CPT | Performed by: PHYSICIAN ASSISTANT

## 2022-08-09 RX ORDER — METFORMIN HCL 500 MG
1000 TABLET, EXTENDED RELEASE 24 HR ORAL 2 TIMES DAILY WITH MEALS
Qty: 360 TABLET | Refills: 1 | Status: SHIPPED | OUTPATIENT
Start: 2022-08-09 | End: 2023-02-28

## 2022-08-09 RX ORDER — PRAVASTATIN SODIUM 20 MG
20 TABLET ORAL DAILY
Qty: 90 TABLET | Refills: 3 | Status: SHIPPED | OUTPATIENT
Start: 2022-08-09 | End: 2023-10-17

## 2022-08-09 RX ORDER — LISINOPRIL AND HYDROCHLOROTHIAZIDE 12.5; 2 MG/1; MG/1
2 TABLET ORAL DAILY
Qty: 180 TABLET | Refills: 1 | Status: SHIPPED | OUTPATIENT
Start: 2022-08-09 | End: 2023-03-01

## 2022-08-09 RX ORDER — GLIPIZIDE 10 MG/1
TABLET, FILM COATED, EXTENDED RELEASE ORAL
Qty: 180 TABLET | Refills: 1 | Status: SHIPPED | OUTPATIENT
Start: 2022-08-09 | End: 2023-02-28

## 2022-08-09 ASSESSMENT — ENCOUNTER SYMPTOMS
NERVOUS/ANXIOUS: 0
COUGH: 0
PARESTHESIAS: 0
DIZZINESS: 0
WEAKNESS: 0
SHORTNESS OF BREATH: 0
CHILLS: 0
PALPITATIONS: 0
FREQUENCY: 0
HEMATURIA: 0
HEADACHES: 0
HEARTBURN: 0
ABDOMINAL PAIN: 0
FEVER: 0
DYSURIA: 0
BREAST MASS: 0
DIARRHEA: 0
MYALGIAS: 0
NAUSEA: 0
EYE PAIN: 0
SORE THROAT: 0
ARTHRALGIAS: 0
CONSTIPATION: 0
JOINT SWELLING: 0
HEMATOCHEZIA: 0

## 2022-08-09 ASSESSMENT — PAIN SCALES - GENERAL: PAINLEVEL: NO PAIN (0)

## 2022-08-09 NOTE — PROGRESS NOTES
SUBJECTIVE:   CC: Vikki Medina is an 57 year old woman who presents for preventive health visit.       Patient has been advised of split billing requirements and indicates understanding: Yes  Healthy Habits:     Getting at least 3 servings of Calcium per day:  Yes    Bi-annual eye exam:  Yes    Dental care twice a year:  Yes    Sleep apnea or symptoms of sleep apnea:  None    Diet:  Low fat/cholesterol and Diabetic    Frequency of exercise:  2-3 days/week    Duration of exercise:  15-30 minutes    Taking medications regularly:  Yes    Medication side effects:  None    PHQ-2 Total Score: 0    Additional concerns today:  No      Diabetes Follow-up    How often are you checking your blood sugar? Two times daily  Blood sugar testing frequency justification:  Patient modifying lifestyle changes (diet, exercise) with blood sugars  What time of day are you checking your blood sugars (select all that apply)?  Before and after meals  Have you had any blood sugars above 200?  Yes - sometimes  Have you had any blood sugars below 70?  No    What symptoms do you notice when your blood sugar is low?  Weak and Lethargy    What concerns do you have today about your diabetes? None     Do you have any of these symptoms? (Select all that apply)  No numbness or tingling in feet.  No redness, sores or blisters on feet.  No complaints of excessive thirst.  No reports of blurry vision.  No significant changes to weight.    Have you had a diabetic eye exam in the last 12 months? No  Due June 2022.  [facility]- last eye exam June 2021          Hyperlipidemia Follow-Up      Are you regularly taking any medication or supplement to lower your cholesterol?   Yes-      Are you having muscle aches or other side effects that you think could be caused by your cholesterol lowering medication?  No    Hypertension Follow-up      Do you check your blood pressure regularly outside of the clinic? No     Are you following a low salt  diet? No    Are your blood pressures ever more than 140 on the top number (systolic) OR more   than 90 on the bottom number (diastolic), for example 140/90? No    BP Readings from Last 2 Encounters:   22 116/78   03/10/22 118/76     Hemoglobin A1C (%)   Date Value   2021 8.3 (H)   10/29/2021 8.6 (H)   2021 8.6 (H)   03/10/2020 7.3 (H)     LDL Cholesterol Calculated (mg/dL)   Date Value   10/29/2021 114 (H)   2021 76   10/29/2019 82         Today's PHQ-2 Score:   PHQ-2 (  [employer]) 2022   Q1: Little interest or pleasure in doing things 0   Q2: Feeling down, depressed or hopeless 0   PHQ-2 Score 0   PHQ-2 Total Score (12-17 Years)- Positive if 3 or more points; Administer PHQ-A if positive -   Q1: Little interest or pleasure in doing things Not at all   Q2: Feeling down, depressed or hopeless Not at all   PHQ-2 Score 0       Abuse: Current or Past (Physical, Sexual or Emotional) - NO  Do you feel safe in your environment? YES        Social History     Tobacco Use     Smoking status: Former Smoker     Types: Cigarettes     Quit date: 2018     Years since quittin.5     Smokeless tobacco: Never Used     Tobacco comment: 4-5 per day   Substance Use Topics     Alcohol use: No     Alcohol/week: 0.0 standard drinks     If you drink alcohol do you typically have >3 drinks per day or >7 drinks per week? No    Alcohol Use 2022   Prescreen: >3 drinks/day or >7 drinks/week? Not Applicable   Prescreen: >3 drinks/day or >7 drinks/week? -   No flowsheet data found.    Reviewed orders with patient.  Reviewed health maintenance and updated orders accordingly - Yes  Lab work is in process    Breast Cancer Screening:    Breast CA Risk Assessment (FHS-7) 2021   Do you have a family history of breast, colon, or ovarian cancer? No / Unknown         Mammogram Screening: Recommended mammography every 1-2 years with patient discussion and risk factor consideration  Pertinent mammograms are  reviewed under the imaging tab.    History of abnormal Pap smear: NO - age 30-65 PAP every 5 years with negative HPV co-testing recommended  PAP / HPV Latest Ref Rng & Units 10/29/2019 12/15/2016 10/3/2013   PAP (Historical) - NIL NIL NIL   HPV16 NEG:Negative Negative - -   HPV18 NEG:Negative Negative - -   HRHPV NEG:Negative Negative - -     Reviewed and updated as needed this visit by clinical staff   Tobacco  Allergies  Meds  Problems  Med Hx  Surg Hx  Fam Hx  Soc   Hx          Reviewed and updated as needed this visit by Provider                     Review of Systems   Constitutional: Negative for chills and fever.   HENT: Negative for congestion, ear pain, hearing loss and sore throat.    Eyes: Negative for pain.   Respiratory: Negative for cough and shortness of breath.    Cardiovascular: Negative for chest pain, palpitations and peripheral edema.   Gastrointestinal: Negative for abdominal pain, constipation, diarrhea, heartburn, hematochezia and nausea.   Breasts:  Negative for tenderness, breast mass and discharge.   Genitourinary: Negative for dysuria, frequency, genital sores, hematuria, pelvic pain, urgency, vaginal bleeding and vaginal discharge.   Musculoskeletal: Negative for arthralgias, joint swelling and myalgias.   Skin: Negative for rash.   Neurological: Negative for dizziness, weakness, headaches and paresthesias.   Psychiatric/Behavioral: Negative for mood changes. The patient is not nervous/anxious.           OBJECTIVE:   /78 (BP Location: Right arm, Patient Position: Sitting, Cuff Size: Adult Large)   Pulse 84   Temp 98.1  F (36.7  C) (Oral)   Resp 20   Wt 86.8 kg (191 lb 4.8 oz)   SpO2 97%   BMI 36.15 kg/m    Physical Exam  GENERAL APPEARANCE: healthy, alert and no distress  EYES: Eyes grossly normal to inspection, PERRL and conjunctivae and sclerae normal  HENT: ear canals and TM's normal, nose and mouth without ulcers or lesions, oropharynx clear and oral mucous membranes  moist  NECK: no adenopathy, no asymmetry, masses, or scars and thyroid normal to palpation  RESP: lungs clear to auscultation - no rales, rhonchi or wheezes  CV: regular rate and rhythm, normal S1 S2, no S3 or S4, no murmur, click or rub, no peripheral edema and peripheral pulses strong  ABDOMEN: soft, nontender, no hepatosplenomegaly, no masses and bowel sounds normal   (female): normal female external genitalia, normal urethral meatus, vaginal mucosal atrophy noted, normal cervix, adnexae, and uterus without masses or abnormal discharge  MS: no musculoskeletal defects are noted and gait is age appropriate without ataxia  SKIN: no suspicious lesions or rashes  NEURO: Normal strength and tone, sensory exam grossly normal, mentation intact and speech normal  PSYCH: mentation appears normal and affect normal/bright    Diagnostic Test Results:  Labs reviewed in Epic    ASSESSMENT/PLAN:   (Z00.00) Routine general medical examination at a health care facility  (primary encounter diagnosis)  Comment:   Plan: check labs today- patient doing well overall    (E11.65) Type 2 diabetes mellitus with hyperglycemia, without long-term current use of insulin (H)  Comment:   Plan: glipiZIDE (GLUCOTROL XL) 10 MG 24 hr tablet,         metFORMIN (GLUCOPHAGE XR) 500 MG 24 hr tablet        Check labs today, refills given.    (Z12.4) Cervical cancer screening  Comment:   Plan: Pap screen with HPV - recommended age 30 - 65         years            (I10) Essential hypertension  Comment:   Plan: lisinopril-hydrochlorothiazide (ZESTORETIC)         20-12.5 MG tablet        BP controlled, refills given    (E78.5) Hyperlipidemia with target LDL less than 100  Comment:   Plan: pravastatin (PRAVACHOL) 20 MG tablet        Check fasting labs- refills given    (E66.01) Morbid obesity (H)  Comment:   Plan: Weight is contributing to worsening of chronic conditions and possible development of future problems such as heart disease.  Weight loss is  "recommended.      (R53.83) Other fatigue  Comment:   Plan: Vitamin D Deficiency, Vitamin B12        Patient feeling tired- past hx of     (N18.2) CKD (chronic kidney disease) stage 2, GFR 60-89 ml/min  Comment:   Plan: check labs today    Patient has been advised of split billing requirements and indicates understanding: Yes    COUNSELING:  Reviewed preventive health counseling, as reflected in patient instructions       Regular exercise       Healthy diet/nutrition       Vision screening    Estimated body mass index is 36.15 kg/m  as calculated from the following:    Height as of 3/10/22: 1.549 m (5' 1\").    Weight as of this encounter: 86.8 kg (191 lb 4.8 oz).    Weight management plan: Discussed healthy diet and exercise guidelines    She reports that she quit smoking about 4 years ago. Her smoking use included cigarettes. She has never used smokeless tobacco.      Counseling Resources:  ATP IV Guidelines  Pooled Cohorts Equation Calculator  Breast Cancer Risk Calculator  BRCA-Related Cancer Risk Assessment: FHS-7 Tool  FRAX Risk Assessment  ICSI Preventive Guidelines  Dietary Guidelines for Americans, 2010  USDA's MyPlate  ASA Prophylaxis  Lung CA Screening    Edenilson Hayes PA-C  [facility]  "

## 2022-08-09 NOTE — NURSING NOTE
"Chief Complaint   Patient presents with     Physical     With pap     Blood Draw     Fasting labs.     Refill Request     Initial /78 (BP Location: Right arm, Patient Position: Sitting, Cuff Size: Adult Large)   Pulse 84   Temp 98.1  F (36.7  C) (Oral)   Resp 20   Wt 86.8 kg (191 lb 4.8 oz)   SpO2 97%   BMI 36.15 kg/m   Estimated body mass index is 36.15 kg/m  as calculated from the following:    Height as of 3/10/22: 1.549 m (5' 1\").    Weight as of this encounter: 86.8 kg (191 lb 4.8 oz).  BP completed using cuff size large right arm    Lisa Magill, CMA    "

## 2022-08-10 LAB
ALBUMIN SERPL-MCNC: 3.8 G/DL (ref 3.4–5)
ALP SERPL-CCNC: 87 U/L (ref 40–150)
ALT SERPL W P-5'-P-CCNC: 27 U/L (ref 0–50)
ANION GAP SERPL CALCULATED.3IONS-SCNC: 9 MMOL/L (ref 3–14)
AST SERPL W P-5'-P-CCNC: 25 U/L (ref 0–45)
BILIRUB SERPL-MCNC: 0.2 MG/DL (ref 0.2–1.3)
BUN SERPL-MCNC: 24 MG/DL (ref 7–30)
CALCIUM SERPL-MCNC: 9.2 MG/DL (ref 8.5–10.1)
CHLORIDE BLD-SCNC: 107 MMOL/L (ref 94–109)
CHOLEST SERPL-MCNC: 171 MG/DL
CO2 SERPL-SCNC: 22 MMOL/L (ref 20–32)
CREAT SERPL-MCNC: 0.88 MG/DL (ref 0.52–1.04)
FASTING STATUS PATIENT QL REPORTED: YES
GFR SERPL CREATININE-BSD FRML MDRD: 77 ML/MIN/1.73M2
GLUCOSE BLD-MCNC: 135 MG/DL (ref 70–99)
HDLC SERPL-MCNC: 55 MG/DL
LDLC SERPL CALC-MCNC: 84 MG/DL
NONHDLC SERPL-MCNC: 116 MG/DL
POTASSIUM BLD-SCNC: 4.1 MMOL/L (ref 3.4–5.3)
PROT SERPL-MCNC: 7.4 G/DL (ref 6.8–8.8)
SODIUM SERPL-SCNC: 138 MMOL/L (ref 133–144)
TRIGL SERPL-MCNC: 162 MG/DL

## 2022-08-11 LAB
BKR LAB AP GYN ADEQUACY: NORMAL
BKR LAB AP GYN INTERPRETATION: NORMAL
BKR LAB AP HPV REFLEX: NORMAL
BKR LAB AP PREVIOUS ABNORMAL: NORMAL
PATH REPORT.COMMENTS IMP SPEC: NORMAL
PATH REPORT.COMMENTS IMP SPEC: NORMAL
PATH REPORT.RELEVANT HX SPEC: NORMAL

## 2022-08-12 ENCOUNTER — TELEPHONE (OUTPATIENT)
Dept: FAMILY MEDICINE | Facility: CLINIC | Age: 57
End: 2022-08-12

## 2022-08-12 NOTE — TELEPHONE ENCOUNTER
MTM referral from: [facility] visit (referral by provider)    MTM referral outreach attempt #2 on August 12, 2022 at 10:26 AM      Outcome: Patient not reachable after several attempts, will route to [facility] Pharmacist/Provider as an FYI.  [facility] scheduling number is [phone].  Thank you for the referral.    Kim Rivera [facility]

## 2022-08-16 ENCOUNTER — PATIENT OUTREACH (OUTPATIENT)
Dept: FAMILY MEDICINE | Facility: CLINIC | Age: 57
End: 2022-08-16

## 2022-08-16 LAB
HUMAN PAPILLOMA VIRUS 16 DNA: NEGATIVE
HUMAN PAPILLOMA VIRUS 18 DNA: NEGATIVE
HUMAN PAPILLOMA VIRUS FINAL DIAGNOSIS: ABNORMAL
HUMAN PAPILLOMA VIRUS OTHER HR: POSITIVE

## 2022-09-01 ENCOUNTER — LAB (OUTPATIENT)
Dept: LAB | Facility: CLINIC | Age: 57
End: 2022-09-01
Payer: COMMERCIAL

## 2022-09-01 DIAGNOSIS — Z20.822 ENCOUNTER FOR LABORATORY TESTING FOR COVID-19 VIRUS: ICD-10-CM

## 2022-09-01 LAB — SARS-COV-2 RNA RESP QL NAA+PROBE: NEGATIVE

## 2022-09-01 PROCEDURE — U0005 INFEC AGEN DETEC AMPLI PROBE: HCPCS

## 2022-09-01 PROCEDURE — U0003 INFECTIOUS AGENT DETECTION BY NUCLEIC ACID (DNA OR RNA); SEVERE ACUTE RESPIRATORY SYNDROME CORONAVIRUS 2 (SARS-COV-2) (CORONAVIRUS DISEASE [COVID-19]), AMPLIFIED PROBE TECHNIQUE, MAKING USE OF HIGH THROUGHPUT TECHNOLOGIES AS DESCRIBED BY CMS-2020-01-R: HCPCS

## 2022-10-10 ENCOUNTER — HEALTH MAINTENANCE LETTER (OUTPATIENT)
Age: 57
End: 2022-10-10

## 2022-11-07 ENCOUNTER — ALLIED HEALTH/NURSE VISIT (OUTPATIENT)
Dept: FAMILY MEDICINE | Facility: CLINIC | Age: 57
End: 2022-11-07
Payer: COMMERCIAL

## 2022-11-07 DIAGNOSIS — Z23 NEED FOR PROPHYLACTIC VACCINATION AND INOCULATION AGAINST INFLUENZA: Primary | ICD-10-CM

## 2022-11-07 PROCEDURE — 90471 IMMUNIZATION ADMIN: CPT

## 2022-11-07 PROCEDURE — 90750 HZV VACC RECOMBINANT IM: CPT

## 2022-11-07 PROCEDURE — 99207 PR NO CHARGE NURSE ONLY: CPT

## 2022-11-18 ENCOUNTER — TELEPHONE (OUTPATIENT)
Dept: FAMILY MEDICINE | Facility: CLINIC | Age: 57
End: 2022-11-18

## 2022-11-18 NOTE — TELEPHONE ENCOUNTER
Patient Quality Outreach      Summary:    Patient has the following on her problem list/HM:     Diabetes    Last A1C:  Lab Results   Component Value Date    A1C 8.4 2022    A1C 8.3 2021    A1C 8.6 2021    A1C 7.3 03/10/2020       Last LDL:    Lab Results   Component Value Date    LDL 84 2022    LDL 76 2021       Is the patient on a Statin? Yes          Is the patient on Aspirin? Yes    Medications     HMG CoA Reductase Inhibitors     pravastatin (PRAVACHOL) 20 MG tablet       Salicylates     aspirin (ASPIRIN LOW DOSE) 81 MG EC tablet             Last three blood pressure readings:  BP Readings from Last 3 Encounters:   22 116/78   03/10/22 118/76   22 136/82          Tobacco Use      Smoking status: Former        Types: Cigarettes        Quit date: 2018        Years since quittin.8      Smokeless tobacco: Never      Tobacco comments: 4-5 per day          Hypertension   Last three blood pressure readings:  BP Readings from Last 3 Encounters:   22 116/78   03/10/22 118/76   22 136/82     Blood pressure: Passed    HTN Guidelines:  ? 139/89     Patient is due/failing the following:   Diabetes -  A1C, Eye Exam and Diabetic Follow-Up Visit  Hypertension -  Hypertension follow-up visit    Type of outreach:    Sent [employer] message.    Questions for provider review:    None                                                                                                                                     Lisa Magill, CMA       Chart routed to Care Team.

## 2022-11-27 ENCOUNTER — HEALTH MAINTENANCE LETTER (OUTPATIENT)
Age: 57
End: 2022-11-27

## 2022-12-02 NOTE — TELEPHONE ENCOUNTER
Patient Quality Outreach Summary      Summary:    Patient is due/failing the following:   A1C, Eye Exam, Microalbumin, Diabetic Follow-Up Visit and Foot Exam and Hypertension follow-up visit    Type of outreach:    NONE, Patient has viewed the MY CHART message regarding the health maintenance that is due.      Questions for provider review:    None                                                                                                                    Lisa Magill, CMA

## 2022-12-29 DIAGNOSIS — I10 ESSENTIAL HYPERTENSION: ICD-10-CM

## 2022-12-29 RX ORDER — LISINOPRIL AND HYDROCHLOROTHIAZIDE 12.5; 2 MG/1; MG/1
2 TABLET ORAL DAILY
Qty: 180 TABLET | Refills: 1 | OUTPATIENT
Start: 2022-12-29

## 2022-12-29 NOTE — TELEPHONE ENCOUNTER
Already approved lisinopril-hydrochlorothiazide (ZESTORETIC) 20-12.5 MG tablet (180 tablets with 1 refill on 8/9/2022). Patient requesting refills too soon.     Darshana HONEYCUTT RN   Patient Advocate Liaison (PAL)  [facility]   [phone]

## 2023-02-28 ENCOUNTER — OFFICE VISIT (OUTPATIENT)
Dept: FAMILY MEDICINE | Facility: CLINIC | Age: 58
End: 2023-02-28
Payer: COMMERCIAL

## 2023-02-28 ENCOUNTER — TELEPHONE (OUTPATIENT)
Dept: FAMILY MEDICINE | Facility: CLINIC | Age: 58
End: 2023-02-28

## 2023-02-28 VITALS
SYSTOLIC BLOOD PRESSURE: 112 MMHG | OXYGEN SATURATION: 97 % | DIASTOLIC BLOOD PRESSURE: 74 MMHG | WEIGHT: 191.6 LBS | HEART RATE: 92 BPM | HEIGHT: 61 IN | TEMPERATURE: 98 F | BODY MASS INDEX: 36.17 KG/M2 | RESPIRATION RATE: 19 BRPM

## 2023-02-28 DIAGNOSIS — E66.01 MORBID OBESITY (H): ICD-10-CM

## 2023-02-28 DIAGNOSIS — E11.65 TYPE 2 DIABETES MELLITUS WITH HYPERGLYCEMIA, WITHOUT LONG-TERM CURRENT USE OF INSULIN (H): Primary | ICD-10-CM

## 2023-02-28 DIAGNOSIS — Z91.199 NO-SHOW FOR APPOINTMENT: Primary | ICD-10-CM

## 2023-02-28 LAB
CREAT UR-MCNC: 155 MG/DL
HBA1C MFR BLD: 9.8 % (ref 0–5.6)
HOLD SPECIMEN: NORMAL
MICROALBUMIN UR-MCNC: 51.9 MG/L
MICROALBUMIN/CREAT UR: 33.48 MG/G CR (ref 0–25)

## 2023-02-28 PROCEDURE — 99214 OFFICE O/P EST MOD 30 MIN: CPT | Performed by: NURSE PRACTITIONER

## 2023-02-28 RX ORDER — GLIPIZIDE 10 MG/1
TABLET, FILM COATED, EXTENDED RELEASE ORAL
Qty: 180 TABLET | Refills: 1 | Status: SHIPPED | OUTPATIENT
Start: 2023-02-28 | End: 2023-06-14

## 2023-02-28 RX ORDER — METFORMIN HCL 500 MG
1000 TABLET, EXTENDED RELEASE 24 HR ORAL 2 TIMES DAILY WITH MEALS
Qty: 360 TABLET | Refills: 1 | Status: SHIPPED | OUTPATIENT
Start: 2023-02-28 | End: 2024-05-01 | Stop reason: ALTCHOICE

## 2023-02-28 ASSESSMENT — PAIN SCALES - GENERAL: PAINLEVEL: NO PAIN (0)

## 2023-02-28 NOTE — TELEPHONE ENCOUNTER
Patient Returning Call    Reason for call:  Patient called her ins. And they do cover for Ozenpec medication and she would like to get the medications.     Information relayed to patient:  Will send msg to provider    Patient has additional questions:  No    What are your questions/concerns:  n/a    [facility] or would you prefer to receive a phone call?:   Patient would prefer a phone call   Okay to leave a detailed message?: Yes at Cell number on file:    Telephone Information:   Mobile [phone]

## 2023-02-28 NOTE — TELEPHONE ENCOUNTER
Ordered Ozempic to pharmacy on file. Also placed A1c lab order to have done prior to seeing PCP in June. Let me know if she has any additional questions.     ARIELLE Sánchez CNP

## 2023-02-28 NOTE — PROGRESS NOTES
"  Assessment & Plan     Type 2 diabetes mellitus with hyperglycemia, without long-term current use of insulin (H)  Chronic unstable; discussed diet and exercise at length. Also strongly encouraged to start 3rd agent advised to follow up with insurance to see which of Ozempic, Trulicity, or Lantus they will cover and let me know. Patient was educated in clinic on these medications. Patient should follow up in 3 months after starting one of these. Discussed diabetes education or MTM patient declines at this time.    - metFORMIN (GLUCOPHAGE XR) 500 MG 24 hr tablet; Take 2 tablets (1,000 mg) by mouth 2 times daily (with meals) TAKE 2 TABLETS BY MOUTH TWICE A DAY WITH MEALS  - glipiZIDE (GLUCOTROL XL) 10 MG 24 hr tablet; TAKE 2 TABLETS BY MOUTH EVERY DAY    Morbid obesity (H)  Chronic unstable; see above.      BMI:   Estimated body mass index is 36.22 kg/m  as calculated from the following:    Height as of this encounter: 1.549 m (5' 0.98\").    Weight as of this encounter: 86.9 kg (191 lb 9.6 oz).   Weight management plan: Discussed healthy diet and exercise guidelines      Return in about 3 months (around 5/28/2023) for Follow up.    ARIELLE Sánchez CNP  [facility] LEONARDA Florez is a 57 year old, presenting for the following health issues:  Diabetes and Follow Up      History of Present Illness           Diabetes Follow-up    How often are you checking your blood sugar? Two times daily  Blood sugar testing frequency justification:  Uncontrolled diabetes  What time of day are you checking your blood sugars (select all that apply)?  At bedtime  Have you had any blood sugars above 200?  Yes 201  Have you had any blood sugars below 70?  No    What symptoms do you notice when your blood sugar is low?  Weak and Lethargy    What concerns do you have today about your diabetes? None and Other: Ran out of medication     Do you have any of these symptoms? (Select all that apply)  No numbness or " "tingling in feet.  No redness, sores or blisters on feet.  No complaints of excessive thirst.  No reports of blurry vision.  No significant changes to weight.    Have you had a diabetic eye exam in the last 12 months? No        BP Readings from Last 2 Encounters:   02/28/23 112/74   08/09/22 116/78     Hemoglobin A1C (%)   Date Value   02/28/2023 9.8 (H)   08/09/2022 8.4 (H)   07/05/2021 8.6 (H)   03/10/2020 7.3 (H)     LDL Cholesterol Calculated (mg/dL)   Date Value   08/09/2022 84   10/29/2021 114 (H)   07/05/2021 76   10/29/2019 82     The 10-year ASCVD risk score (Peewee CHAVEZ, et al., 2019) is: 3.3%    Values used to calculate the score:      Age: 57 years      Sex: Female      Is Non- : No      Diabetic: Yes      Tobacco smoker: No      Systolic Blood Pressure: 112 mmHg      Is BP treated: Yes      HDL Cholesterol: 55 mg/dL      Total Cholesterol: 171 mg/dL    Patient presents to clinic for diabetes follow up. States her diet has been worse in the past 6 months has been eating out a lot more than normal; has not been exercising; checking bg twice daily and before bed, have been over 200 consistently, taking medication as prescribed.     Denies numbness or tingling    Review of Systems   Constitutional, HEENT, cardiovascular, pulmonary, gi and gu systems are negative, except as otherwise noted.      Objective    /74 (BP Location: Right arm, Patient Position: Sitting, Cuff Size: Adult Large)   Pulse 92   Temp 98  F (36.7  C) (Oral)   Resp 19   Ht 1.549 m (5' 0.98\")   Wt 86.9 kg (191 lb 9.6 oz)   SpO2 97%   BMI 36.22 kg/m    Body mass index is 36.22 kg/m .  Physical Exam   GENERAL: healthy, alert and no distress  RESP: lungs clear to auscultation - no rales, rhonchi or wheezes  CV: regular rate and rhythm, normal S1 S2, no S3 or S4, no murmur, click or rub, no peripheral edema and peripheral pulses strong  Diabetic foot exam: normal DP and PT pulses, no trophic changes or " ulcerative lesions, normal sensory exam and normal monofilament exam    Results for orders placed or performed in visit on 02/28/23   HEMOGLOBIN A1C     Status: Abnormal   Result Value Ref Range    Hemoglobin A1C 9.8 (H) 0.0 - 5.6 %    Narrative    .[email or web address]   Albumin Random Urine Quantitative with Creat Ratio     Status: Abnormal   Result Value Ref Range    Creatinine Urine mg/dL 155.0 mg/dL    Albumin Urine mg/L 51.9 mg/L    Albumin Urine mg/g Cr 33.48 (H) 0.00 - 25.00 mg/g Cr   Extra Green Top Tube (LAB USE ONLY)     Status: None   Result Value Ref Range    Hold Specimen JIC

## 2023-03-01 ENCOUNTER — TRANSFERRED RECORDS (OUTPATIENT)
Dept: MULTI SPECIALTY CLINIC | Facility: CLINIC | Age: 58
End: 2023-03-01

## 2023-03-01 LAB — RETINOPATHY: NORMAL

## 2023-03-05 DIAGNOSIS — E11.65 TYPE 2 DIABETES MELLITUS WITH HYPERGLYCEMIA, WITHOUT LONG-TERM CURRENT USE OF INSULIN (H): ICD-10-CM

## 2023-03-08 ENCOUNTER — TELEPHONE (OUTPATIENT)
Dept: FAMILY MEDICINE | Facility: CLINIC | Age: 58
End: 2023-03-08
Payer: COMMERCIAL

## 2023-03-08 RX ORDER — SEMAGLUTIDE 1.34 MG/ML
INJECTION, SOLUTION SUBCUTANEOUS
Refills: 0 | OUTPATIENT
Start: 2023-03-08

## 2023-03-08 NOTE — TELEPHONE ENCOUNTER
Received a message from the pharmacy.  Pt needs a prior auth for ozempic.  Will forward to the pa pool.

## 2023-03-08 NOTE — TELEPHONE ENCOUNTER
Will start a prior authorization for ozempic.  See telephone call of 3/8/23.      Pt should have a refill - refill sent 2/28/23.

## 2023-03-12 NOTE — TELEPHONE ENCOUNTER
Central Prior Authorization Team   Phone: [phone]    PA Initiation    Medication: ozempic  Insurance Company:    Pharmacy Filling the Rx: [id number] - [place], [place] - [address]  Filling Pharmacy Phone: [phone]  Filling Pharmacy Fax: [phone]  Start Date: 3/11/2023

## 2023-03-13 NOTE — TELEPHONE ENCOUNTER
Prior Authorization Approval    Authorization Effective Date: 3/9/2023  Authorization Expiration Date: 3/9/2024  Medication: Ozempic - APPROVED  Approved Dose/Quantity:    Reference #:     Insurance Company:    Expected CoPay:       CoPay Card Available:      Foundation Assistance Needed:    Which Pharmacy is filling the prescription (Not needed for infusion/clinic administered): [id number] [place] [facility] [address]  [address]   Pharmacy Notified: Yes  Patient Notified: Yes  VM LEFT FOR PATIENT ADVISING OF THE APPROVAL AND TO CONTACT PHARMACY TO CHECK ON AVAILABILITY.

## 2023-04-24 ENCOUNTER — ANCILLARY PROCEDURE (OUTPATIENT)
Dept: MAMMOGRAPHY | Facility: CLINIC | Age: 58
End: 2023-04-24
Attending: FAMILY MEDICINE
Payer: COMMERCIAL

## 2023-04-24 DIAGNOSIS — Z12.31 VISIT FOR SCREENING MAMMOGRAM: ICD-10-CM

## 2023-04-24 PROCEDURE — 77067 SCR MAMMO BI INCL CAD: CPT | Mod: TC | Performed by: RADIOLOGY

## 2023-04-24 PROCEDURE — 77063 BREAST TOMOSYNTHESIS BI: CPT | Mod: TC | Performed by: RADIOLOGY

## 2023-06-08 DIAGNOSIS — I10 ESSENTIAL HYPERTENSION: ICD-10-CM

## 2023-06-09 RX ORDER — LISINOPRIL AND HYDROCHLOROTHIAZIDE 12.5; 2 MG/1; MG/1
2 TABLET ORAL DAILY
Qty: 180 TABLET | Refills: 0 | OUTPATIENT
Start: 2023-06-09

## 2023-06-09 NOTE — TELEPHONE ENCOUNTER
Already approved. lisinopril-hydrochlorothiazide (ZESTORETIC) 20-12.5 MG tablet 180 tablet 1 3/1/2023. Should have refills on file.     Amisha ESTRADA RN   PAL (Patient Advocate Liaison)  [facility]  [phone]

## 2023-06-11 ENCOUNTER — HEALTH MAINTENANCE LETTER (OUTPATIENT)
Age: 58
End: 2023-06-11

## 2023-07-10 ENCOUNTER — PATIENT OUTREACH (OUTPATIENT)
Dept: CARE COORDINATION | Facility: CLINIC | Age: 58
End: 2023-07-10
Payer: COMMERCIAL

## 2023-07-14 ENCOUNTER — PATIENT OUTREACH (OUTPATIENT)
Dept: FAMILY MEDICINE | Facility: CLINIC | Age: 58
End: 2023-07-14
Payer: COMMERCIAL

## 2023-07-14 NOTE — TELEPHONE ENCOUNTER
SB 3 PAL Welcome Letter Sent  Colby Short - Patient Advocate Liaison - PAL RN  [facility]  [phone]

## 2023-07-14 NOTE — LETTER
Jacquelyn Florez    Thank you for choosing [facility] today for your health care needs.     [facility] is transforming primary care  At [facility], we re dedicated to constantly improve how we serve the health care needs of our patients and communities. We re currently making changes to the way we deliver care.     Changes you ll notice include:  An emphasis on building a relationship with a primary care provider  Access to a PAL (patient advocate and liaison) to help guide you with your care needs  Appointment lengths tailored to your specific needs and greater access to a care team to help you and your provider improve and maintain your health and well-being  Improved online access to your care team    Benefits of a primary care provider  If you don t have a designated primary care provider, we encourage you to get to know our care team online and find a provider you d like to see. Most of our providers have a short video on their online provider page. Visit [place] to explore our providers and locations.    Benefits of having a primary care provider include:    They get to know you - your health history, family history and goals, making it easier to make a health plan together.   You get to know them - making health-related conversations and decisions easier    Primary care doctors help you when you re sick or hurt - but also focus on keeping you healthy with preventive care and screenings.    A doctor who sees you regularly is more likely to notice changes in your health.   You ll be connected to a broad care team who partners with your provider to support you.    Patient Advocate Liaison (PAL)   To help make sure you get the right care, at the right time, we include PALs, or Patient Advocate Liaisons, as part of your care team. Your PAL (Colby COLLIER -[phone]) will be your first line of contact. Your PAL will advocate for your needs and help you navigate our services, connecting you with  care team members and community resources to ensure your care is well coordinated. You ll be introduced to a PAL in an upcoming visit.     Expanded care team access with tailored appointment lengths  Depending on your health care needs, you may have longer or shorter appointments and see additional care team providers - including Medication Therapy Management (MTM) pharmacists, diabetes educators, behavioral health clinicians, or social workers. At times, they may be included in your visit with your provider, or you may see them individually.     Online access to your health care records and care team  [employer] is our online tool that makes it easy to see your health care information and communicate with your care team.     [employer] allows you to:   View your health maintenance plan so you know when you re due for a preventive screening  Send secure messages to your care team  View your health history and visit summaries   Schedule appointments   Complete questionnaires and eCheck-in before appointments    Get care from your provider with an e-visit    View and pay your bill     Sign up at [employer]. Once you have an account, you also can download the mobile scooby.     Connecting to fast and convenient care  When you need fast, convenient care - consider one of the following options:     Video Visit: A convenient care option for visiting with your provider out of the comfort of your own home. Most of the things you come to the clinic to address with your provider can now be done virtually through a video. This includes your chronic medication follow up, questions or concerns you may have, and even your annual Medicare Wellness Visit.     Phone Visit: Another convenient option for follow up of common problems that may require a more in-depth discussion with your provider.     E-visit: When you need acute care quickly, or have a quick question about your medication, an E-visit is completed through [employer] and  your provider will respond within one business day.      Colby COLLIER RN  Patient Advocate Liaison (PAL)  (Supporting Dr. Tata Belle MD)  [facility]  [phone]

## 2023-07-20 ENCOUNTER — PATIENT OUTREACH (OUTPATIENT)
Dept: FAMILY MEDICINE | Facility: CLINIC | Age: 58
End: 2023-07-20

## 2023-07-20 PROBLEM — R87.810 CERVICAL HIGH RISK HPV (HUMAN PAPILLOMAVIRUS) TEST POSITIVE: Status: ACTIVE | Noted: 2022-08-16

## 2023-07-24 ENCOUNTER — PATIENT OUTREACH (OUTPATIENT)
Dept: CARE COORDINATION | Facility: CLINIC | Age: 58
End: 2023-07-24
Payer: COMMERCIAL

## 2023-08-02 ENCOUNTER — TELEPHONE (OUTPATIENT)
Dept: FAMILY MEDICINE | Facility: CLINIC | Age: 58
End: 2023-08-02

## 2023-08-02 ENCOUNTER — OFFICE VISIT (OUTPATIENT)
Dept: FAMILY MEDICINE | Facility: CLINIC | Age: 58
End: 2023-08-02
Payer: COMMERCIAL

## 2023-08-02 VITALS
OXYGEN SATURATION: 96 % | SYSTOLIC BLOOD PRESSURE: 119 MMHG | HEIGHT: 61 IN | RESPIRATION RATE: 16 BRPM | DIASTOLIC BLOOD PRESSURE: 77 MMHG | TEMPERATURE: 98 F | HEART RATE: 79 BPM | WEIGHT: 185.9 LBS | BODY MASS INDEX: 35.1 KG/M2

## 2023-08-02 DIAGNOSIS — N18.2 CKD (CHRONIC KIDNEY DISEASE) STAGE 2, GFR 60-89 ML/MIN: ICD-10-CM

## 2023-08-02 DIAGNOSIS — N89.8 VAGINAL DISCHARGE: ICD-10-CM

## 2023-08-02 DIAGNOSIS — E11.65 TYPE 2 DIABETES MELLITUS WITH HYPERGLYCEMIA, WITHOUT LONG-TERM CURRENT USE OF INSULIN (H): Primary | ICD-10-CM

## 2023-08-02 DIAGNOSIS — Z12.4 CERVICAL CANCER SCREENING: ICD-10-CM

## 2023-08-02 LAB
ALBUMIN SERPL BCG-MCNC: 4.7 G/DL (ref 3.5–5.2)
ALP SERPL-CCNC: 96 U/L (ref 35–104)
ALT SERPL W P-5'-P-CCNC: 13 U/L (ref 0–50)
ANION GAP SERPL CALCULATED.3IONS-SCNC: 11 MMOL/L (ref 7–15)
AST SERPL W P-5'-P-CCNC: 24 U/L (ref 0–45)
BILIRUB SERPL-MCNC: 0.3 MG/DL
BUN SERPL-MCNC: 17.7 MG/DL (ref 6–20)
CALCIUM SERPL-MCNC: 10.2 MG/DL (ref 8.6–10)
CHLORIDE SERPL-SCNC: 101 MMOL/L (ref 98–107)
CHOLEST SERPL-MCNC: 188 MG/DL
CLUE CELLS: ABNORMAL
CREAT SERPL-MCNC: 0.88 MG/DL (ref 0.51–0.95)
DEPRECATED HCO3 PLAS-SCNC: 27 MMOL/L (ref 22–29)
GFR SERPL CREATININE-BSD FRML MDRD: 77 ML/MIN/1.73M2
GLUCOSE SERPL-MCNC: 120 MG/DL (ref 70–99)
HBA1C MFR BLD: 7.3 % (ref 0–5.6)
HDLC SERPL-MCNC: 59 MG/DL
LDLC SERPL CALC-MCNC: 101 MG/DL
NONHDLC SERPL-MCNC: 129 MG/DL
POTASSIUM SERPL-SCNC: 4.3 MMOL/L (ref 3.4–5.3)
PROT SERPL-MCNC: 7.7 G/DL (ref 6.4–8.3)
SODIUM SERPL-SCNC: 139 MMOL/L (ref 136–145)
TRICHOMONAS, WET PREP: ABNORMAL
TRIGL SERPL-MCNC: 141 MG/DL
WBC'S/HIGH POWER FIELD, WET PREP: ABNORMAL
YEAST, WET PREP: ABNORMAL

## 2023-08-02 PROCEDURE — 99214 OFFICE O/P EST MOD 30 MIN: CPT | Performed by: FAMILY MEDICINE

## 2023-08-02 PROCEDURE — G0145 SCR C/V CYTO,THINLAYER,RESCR: HCPCS | Performed by: FAMILY MEDICINE

## 2023-08-02 PROCEDURE — 80053 COMPREHEN METABOLIC PANEL: CPT | Performed by: FAMILY MEDICINE

## 2023-08-02 PROCEDURE — 87624 HPV HI-RISK TYP POOLED RSLT: CPT | Performed by: FAMILY MEDICINE

## 2023-08-02 PROCEDURE — 87210 SMEAR WET MOUNT SALINE/INK: CPT | Performed by: FAMILY MEDICINE

## 2023-08-02 PROCEDURE — 83036 HEMOGLOBIN GLYCOSYLATED A1C: CPT | Performed by: FAMILY MEDICINE

## 2023-08-02 PROCEDURE — 36415 COLL VENOUS BLD VENIPUNCTURE: CPT | Performed by: FAMILY MEDICINE

## 2023-08-02 PROCEDURE — 80061 LIPID PANEL: CPT | Performed by: FAMILY MEDICINE

## 2023-08-02 RX ORDER — GLIPIZIDE 10 MG/1
10 TABLET, FILM COATED, EXTENDED RELEASE ORAL DAILY
Qty: 180 TABLET | Refills: 0 | Status: SHIPPED | OUTPATIENT
Start: 2023-08-02 | End: 2023-11-08

## 2023-08-02 RX ORDER — METFORMIN HCL 500 MG
1000 TABLET, EXTENDED RELEASE 24 HR ORAL 2 TIMES DAILY WITH MEALS
Qty: 360 TABLET | Refills: 1 | Status: CANCELLED | OUTPATIENT
Start: 2023-08-02

## 2023-08-02 NOTE — TELEPHONE ENCOUNTER
"- Patient calls back, states that the result says \"abnormal\" in the WBC section, wants Dr. Belle to advise on the abnormal WBC section    - Patient wondering what else needs to be done.    - Please review and advise on the elevated WBC section and PAL RN can call back.    Colby Short, RN  Patient Advocate Liaison (PAL)  [facility]  [phone]    08/02/2023 at 2:55 PM   "

## 2023-08-02 NOTE — TELEPHONE ENCOUNTER
Patient is calling to inquire about results of wet prep. Informed patient the doctor has not yet reviewed results.    Will call patient after doctor interprets results.    Danette Birmingham RN

## 2023-08-02 NOTE — TELEPHONE ENCOUNTER
- Called patient, LMTCB # 1  - Myc message sent to patient with result from Dr. Belle.    Cloby Short, RN  Patient Advocate Liaison (PAL)  [facility]  [phone]    08/02/2023 at 2:44 PM

## 2023-08-02 NOTE — PROGRESS NOTES
Assessment & Plan     Type 2 diabetes mellitus with hyperglycemia, without long-term current use of insulin (H)  Will recheck, we may need to taper down on her glipizide, due to weight loss on ozempic and improved sugars, we need to avoid lows. Pt understands  - HEMOGLOBIN A1C; Future  - Lipid panel reflex to direct LDL Non-fasting; Future  - glipiZIDE (GLUCOTROL XL) 10 MG 24 hr tablet; Take 1 tablet (10 mg) by mouth daily  - Albumin Random Urine Quantitative with Creat Ratio; Future  - Semaglutide, 1 MG/DOSE, (OZEMPIC) 4 MG/3ML pen; Inject 1 mg Subcutaneous every 7 days for 90 days  - Hemoglobin A1c  - Lipid panel reflex to direct LDL Non-fasting  - Albumin Random Urine Quantitative with Creat Ratio  Lab Results   Component Value Date    A1C 7.3 08/02/2023    A1C 9.8 02/28/2023    A1C 8.4 08/09/2022    A1C 8.3 12/21/2021    A1C 8.6 10/29/2021    A1C 8.6 07/05/2021    A1C 7.3 03/10/2020    A1C 9.9 10/29/2019    A1C 9.6 04/06/2019    A1C 7.7 10/01/2018     No change in meds recommended, unless getting lows, pt is aware    CKD (chronic kidney disease) stage 2, GFR 60-89 ml/min  Monitoring closely  - COMPREHENSIVE METABOLIC PANEL; Future  - COMPREHENSIVE METABOLIC PANEL    Cervical cancer screening  Pap done  - Pap Screen with HPV - recommended age 30 - 65 years  - HPV Hold (Lab Only)  - HPV High Risk Types DNA Cervical    Vaginal discharge  Will screen, scant discharge noted, otherwise no sx  - Wet prep - Clinic Collect    Ordering of each unique test  Prescription drug management         Work on weight loss  Regular exercise    Tata Belle MD  [facility]    Nisreen Florez is a 58 year old, presenting for the following health issues:  Diabetes        8/2/2023     9:15 AM   Additional Questions   Roomed by Esther Martinez       History of Present Illness       Diabetes:   She presents for follow up of diabetes.  She is checking home blood glucose two times daily.   She checks  blood glucose before meals.  Blood glucose is never over 200 and never under 70. She is aware of hypoglycemia symptoms including shakiness.   She is concerned about other.    She is not experiencing numbness or burning in feet, excessive thirst, blurry vision, weight changes or redness, sores or blisters on feet. The patient has had a diabetic eye exam in the last 12 months. Eye exam performed on march. Location of last eye exam target [place].        She eats 2-3 servings of fruits and vegetables daily.She consumes 0 sweetened beverage(s) daily.She exercises with enough effort to increase her heart rate 20 to 29 minutes per day.  She exercises with enough effort to increase her heart rate 4 days per week.   She is taking medications regularly.     Lab Results   Component Value Date    A1C 9.8 02/28/2023    A1C 8.4 08/09/2022    A1C 8.3 12/21/2021    A1C 8.6 10/29/2021    A1C 8.6 07/05/2021    A1C 7.3 03/10/2020    A1C 9.9 10/29/2019    A1C 9.6 04/06/2019    A1C 7.7 10/01/2018     On ozempic now, glipizide . Stopped metformin, losing weight. Stopped eating after midnight so wakes up with a low often around 83.     Dancing for 20min per day and has a lot energy. Worried about her kidney    Patient presents for a recheck of hyperlipidemia.  The patient is currently following a low fat diet plan and is not following a regular exercise plan.  On pravastin 20mg    Patient presents for evaluation of hypertension.  She indicates that she is feeling well and denies any symptoms referable to her elevated blood pressure. Specifically denies chest pain, palpitations, dyspnea, orthopnea, PND or peripheral edema. Current medication regimen is as listed below. Patient denies any side effects of medication.  Ion lisinopril-hydrochlorothiazide 20-12.5    Stopped smoking, 6 yrs ago, 1/4 ppd, started in high school              Review of Systems   Constitutional, HEENT, cardiovascular, pulmonary, gi and gu systems are negative,  "except as otherwise noted.      Objective    /77 (BP Location: Right arm, Patient Position: Sitting, Cuff Size: Adult Large)   Pulse 79   Temp 98  F (36.7  C) (Oral)   Resp 16   Ht 1.549 m (5' 1\")   Wt 84.3 kg (185 lb 14.4 oz)   SpO2 96%   BMI 35.13 kg/m    Body mass index is 35.13 kg/m .  Physical Exam   GENERAL: healthy, alert and no distress  EYES: Eyes grossly normal to inspection, and conjunctivae and sclerae normal  HENT: ear canals and TM's normal, nose and mouth without ulcers or lesions  NECK: no adenopathy, no asymmetry, masses, or scars and thyroid normal to palpation  RESP: lungs clear to auscultation - no rales, rhonchi or wheezes  CV: regular rate and rhythm, normal S1 S2, no S3 or S4, no murmur, click or rub, no peripheral edema and peripheral pulses strong  ABDOMEN: soft, nontender, no hepatosplenomegaly, no masses and bowel sounds normal   (female): normal female external genitalia, normal urethral meatus, vaginal mucosa pink, moist, well rugated, and normal cervix/adnexa/uterus without masses or discharge  MS: no gross musculoskeletal defects noted, no edema  SKIN: no suspicious lesions or rashes  NEURO: Normal strength and tone, mentation intact and speech normal  PSYCH: mentation appears normal, affect normal/bright                      "

## 2023-08-03 NOTE — TELEPHONE ENCOUNTER
The wet prep is normal and nothing more needs to be done. Is she having symptoms of something or further concerns?    Thank you

## 2023-08-03 NOTE — TELEPHONE ENCOUNTER
- Relayed message, no further questions at this time,    Colby Short, RN  Patient Advocate Liaison (PAL)  [facility]  [phone]    08/03/2023 at 4:49 PM

## 2023-08-04 LAB
BKR LAB AP GYN ADEQUACY: NORMAL
BKR LAB AP GYN INTERPRETATION: NORMAL
BKR LAB AP HPV REFLEX: NORMAL
BKR LAB AP PREVIOUS ABNL DX: NORMAL
BKR LAB AP PREVIOUS ABNORMAL: NORMAL
PATH REPORT.COMMENTS IMP SPEC: NORMAL
PATH REPORT.COMMENTS IMP SPEC: NORMAL
PATH REPORT.RELEVANT HX SPEC: NORMAL

## 2023-08-08 ENCOUNTER — PATIENT OUTREACH (OUTPATIENT)
Dept: FAMILY MEDICINE | Facility: CLINIC | Age: 58
End: 2023-08-08
Payer: COMMERCIAL

## 2023-09-08 ENCOUNTER — PATIENT OUTREACH (OUTPATIENT)
Dept: FAMILY MEDICINE | Facility: CLINIC | Age: 58
End: 2023-09-08
Payer: COMMERCIAL

## 2023-09-08 NOTE — TELEPHONE ENCOUNTER
Patient Quality Outreach Health Maintenance - PAL RN    Summary:    PAL RN contacted pt regarding overdue health maintenance    Patient is due/failing the following:   Physical Preventive Adult Physical      Topic Date Due    Hepatitis B Vaccine (1 of 3 - 3-dose series) Never done    Flu Vaccine (1) 09/01/2023       Health Maintenance Due   Topic Date Due    HEPATITIS B IMMUNIZATION (1 of 3 - 3-dose series) Never done    LUNG CANCER SCREENING  Never done    INFLUENZA VACCINE (1) 09/01/2023    YEARLY PREVENTIVE VISIT  08/09/2023    COLPOSCOPY  11/02/2023       Type of outreach:    Chart review performed, no outreach needed. Patient has [place] scheduled and appt with PCP scheduled    - Advised patient if any questions or concerns comes up to call the PAL RN.   - Patient given opportunity to ask questions and at this time there is nothing further needed.     Questions for provider review:    None    Colby Short, RN  Patient Advocate Liaison (PAL)  [facility]  [phone]    09/08/2023 at 10:36 AM

## 2023-09-10 DIAGNOSIS — E11.65 TYPE 2 DIABETES MELLITUS WITH HYPERGLYCEMIA, WITHOUT LONG-TERM CURRENT USE OF INSULIN (H): ICD-10-CM

## 2023-09-11 RX ORDER — SEMAGLUTIDE 0.68 MG/ML
0.5 INJECTION, SOLUTION SUBCUTANEOUS
Refills: 3 | OUTPATIENT
Start: 2023-09-11

## 2023-09-19 ENCOUNTER — OFFICE VISIT (OUTPATIENT)
Dept: OBGYN | Facility: CLINIC | Age: 58
End: 2023-09-19
Payer: COMMERCIAL

## 2023-09-19 VITALS — DIASTOLIC BLOOD PRESSURE: 76 MMHG | WEIGHT: 182 LBS | BODY MASS INDEX: 34.39 KG/M2 | SYSTOLIC BLOOD PRESSURE: 122 MMHG

## 2023-09-19 DIAGNOSIS — R87.810 CERVICAL HIGH RISK HUMAN PAPILLOMAVIRUS (HPV) DNA TEST POSITIVE: Primary | ICD-10-CM

## 2023-09-19 PROCEDURE — 57456 ENDOCERV CURETTAGE W/SCOPE: CPT | Performed by: OBSTETRICS & GYNECOLOGY

## 2023-09-19 PROCEDURE — 88305 TISSUE EXAM BY PATHOLOGIST: CPT | Performed by: PATHOLOGY

## 2023-09-19 NOTE — PROGRESS NOTES
INDICATIONS:                                                    Is a pregnancy test required: No.  Was a consent obtained?  Yes    Today's PHQ-2 Score:       8/2/2023     9:11 AM   PHQ-2 ( 1999 [employer])   Q1: Little interest or pleasure in doing things 0   Q2: Feeling down, depressed or hopeless 0   PHQ-2 Score 0   Q1: Little interest or pleasure in doing things Not at all   Q2: Feeling down, depressed or hopeless Not at all   PHQ-2 Score 0     Today's PHQ-9 Score:         No data to display                  Vikki Medina, is a 58 year old female, who had a recent HRHPV + pap.  HPV Other positive Yes prior history of abnormal pap. Here today for colposcopy. Discussed indication, risks of infection and bleeding.    Her last pap was   Lab Results   Component Value Date    GYNINTERP  08/02/2023     Negative for Intraepithelial Lesion or Malignancy (NILM)    PAP NIL 10/29/2019    .    PROCEDURE:                                                      Cervix is stained with acetic acid and viewed colposcopically. Squamocolumnar junction is visualized in it's entirety. no visible lesions . Biopsy done No. Endocervical curretage Done       POST PROCEDURE:                                                      IMPRESSION: colposcopy adequate    PLAN : Await the results of the biopsies.  She tolerated the procedure well. There were no complications. Patient was discharged in stable condition.    Patient advised to call the clinic if excessive bleeding, pelvic pain, or fever.     Follow-up based on pathology results.  Taniya Montoya MD

## 2023-09-22 LAB
PATH REPORT.COMMENTS IMP SPEC: NORMAL
PATH REPORT.COMMENTS IMP SPEC: NORMAL
PATH REPORT.FINAL DX SPEC: NORMAL
PATH REPORT.GROSS SPEC: NORMAL
PATH REPORT.MICROSCOPIC SPEC OTHER STN: NORMAL
PATH REPORT.RELEVANT HX SPEC: NORMAL
PHOTO IMAGE: NORMAL

## 2023-09-27 ENCOUNTER — PATIENT OUTREACH (OUTPATIENT)
Dept: OBGYN | Facility: CLINIC | Age: 58
End: 2023-09-27
Payer: COMMERCIAL

## 2023-09-27 DIAGNOSIS — R87.810 CERVICAL HIGH RISK HPV (HUMAN PAPILLOMAVIRUS) TEST POSITIVE: Primary | ICD-10-CM

## 2023-09-27 NOTE — TELEPHONE ENCOUNTER
Dr. Montoya,    You did a colposcopy for this pt on 9/19/23 which was normal. Ok to recommend a cotest in 1 year? Pap history below. Thanks!    8/9/22 NIL Pap, + HR HPV (neg 16/18). Plan cotest in 1 year.   8/2/23 NIL pap, + HR HPV (neg 16/18). [place] due by 11/2/23 9/19/23 [place] [facility] - normal.       Rosy Gaming RN  Pap Tracking

## 2023-10-05 NOTE — TELEPHONE ENCOUNTER
Dr. Montoya,    Please review note below and advise with f/u plan. Thanks!    Rosy Gaming RN  Pap Tracking

## 2023-10-16 DIAGNOSIS — E78.5 HYPERLIPIDEMIA WITH TARGET LDL LESS THAN 100: ICD-10-CM

## 2023-10-17 RX ORDER — PRAVASTATIN SODIUM 20 MG
20 TABLET ORAL DAILY
Qty: 90 TABLET | Refills: 2 | Status: SHIPPED | OUTPATIENT
Start: 2023-10-17 | End: 2024-07-09

## 2023-10-29 ENCOUNTER — HEALTH MAINTENANCE LETTER (OUTPATIENT)
Age: 58
End: 2023-10-29

## 2023-11-07 DIAGNOSIS — E11.65 TYPE 2 DIABETES MELLITUS WITH HYPERGLYCEMIA, WITHOUT LONG-TERM CURRENT USE OF INSULIN (H): ICD-10-CM

## 2023-11-08 RX ORDER — GLIPIZIDE 10 MG/1
20 TABLET, FILM COATED, EXTENDED RELEASE ORAL DAILY
Qty: 180 TABLET | Refills: 1 | Status: SHIPPED | OUTPATIENT
Start: 2023-11-08 | End: 2024-05-01

## 2023-11-15 ENCOUNTER — PATIENT OUTREACH (OUTPATIENT)
Dept: GASTROENTEROLOGY | Facility: CLINIC | Age: 58
End: 2023-11-15
Payer: COMMERCIAL

## 2023-11-16 DIAGNOSIS — E11.65 TYPE 2 DIABETES MELLITUS WITH HYPERGLYCEMIA, WITHOUT LONG-TERM CURRENT USE OF INSULIN (H): ICD-10-CM

## 2023-11-16 DIAGNOSIS — I10 ESSENTIAL HYPERTENSION: ICD-10-CM

## 2023-11-17 RX ORDER — SEMAGLUTIDE 0.68 MG/ML
0.5 INJECTION, SOLUTION SUBCUTANEOUS
Qty: 3 ML | Refills: 3 | Status: SHIPPED | OUTPATIENT
Start: 2023-11-17 | End: 2024-05-09

## 2023-11-17 RX ORDER — LISINOPRIL AND HYDROCHLOROTHIAZIDE 12.5; 2 MG/1; MG/1
2 TABLET ORAL DAILY
Qty: 180 TABLET | Refills: 2 | Status: SHIPPED | OUTPATIENT
Start: 2023-11-17 | End: 2024-04-16

## 2024-01-07 ENCOUNTER — HEALTH MAINTENANCE LETTER (OUTPATIENT)
Age: 59
End: 2024-01-07

## 2024-01-31 ENCOUNTER — TRANSFERRED RECORDS (OUTPATIENT)
Dept: HEALTH INFORMATION MANAGEMENT | Facility: CLINIC | Age: 59
End: 2024-01-31

## 2024-02-23 ENCOUNTER — TRANSFERRED RECORDS (OUTPATIENT)
Dept: MULTI SPECIALTY CLINIC | Facility: CLINIC | Age: 59
End: 2024-02-23
Payer: COMMERCIAL

## 2024-02-23 LAB — RETINOPATHY: NORMAL

## 2024-03-07 ENCOUNTER — HOSPITAL ENCOUNTER (EMERGENCY)
Facility: CLINIC | Age: 59
Discharge: HOME | End: 2024-03-07
Attending: EMERGENCY MEDICINE | Admitting: EMERGENCY MEDICINE
Payer: COMMERCIAL

## 2024-03-07 VITALS
BODY MASS INDEX: 27.43 KG/M2 | DIASTOLIC BLOOD PRESSURE: 80 MMHG | TEMPERATURE: 98.6 F | RESPIRATION RATE: 18 BRPM | OXYGEN SATURATION: 98 % | HEIGHT: 68 IN | WEIGHT: 181 LBS | SYSTOLIC BLOOD PRESSURE: 140 MMHG | HEART RATE: 88 BPM

## 2024-03-07 DIAGNOSIS — R42 VERTIGO: ICD-10-CM

## 2024-03-07 LAB
ANION GAP SERPL CALCULATED.3IONS-SCNC: 12 MMOL/L (ref 7–15)
BASOPHILS # BLD AUTO: 0.1 10E3/UL (ref 0–0.2)
BASOPHILS NFR BLD AUTO: 1 %
BUN SERPL-MCNC: 21.1 MG/DL (ref 6–20)
CALCIUM SERPL-MCNC: 9.8 MG/DL (ref 8.6–10)
CHLORIDE SERPL-SCNC: 98 MMOL/L (ref 98–107)
CREAT SERPL-MCNC: 0.71 MG/DL (ref 0.51–0.95)
DEPRECATED HCO3 PLAS-SCNC: 25 MMOL/L (ref 22–29)
EGFRCR SERPLBLD CKD-EPI 2021: >90 ML/MIN/1.73M2
EOSINOPHIL # BLD AUTO: 0.4 10E3/UL (ref 0–0.7)
EOSINOPHIL NFR BLD AUTO: 4 %
ERYTHROCYTE [DISTWIDTH] IN BLOOD BY AUTOMATED COUNT: 12.4 % (ref 10–15)
GLUCOSE SERPL-MCNC: 128 MG/DL (ref 70–99)
HCT VFR BLD AUTO: 45 % (ref 35–47)
HGB BLD-MCNC: 14.8 G/DL (ref 11.7–15.7)
IMM GRANULOCYTES # BLD: 0 10E3/UL
IMM GRANULOCYTES NFR BLD: 0 %
LYMPHOCYTES # BLD AUTO: 2.8 10E3/UL (ref 0.8–5.3)
LYMPHOCYTES NFR BLD AUTO: 26 %
MCH RBC QN AUTO: 29.1 PG (ref 26.5–33)
MCHC RBC AUTO-ENTMCNC: 32.9 G/DL (ref 31.5–36.5)
MCV RBC AUTO: 89 FL (ref 78–100)
MONOCYTES # BLD AUTO: 0.5 10E3/UL (ref 0–1.3)
MONOCYTES NFR BLD AUTO: 5 %
NEUTROPHILS # BLD AUTO: 6.8 10E3/UL (ref 1.6–8.3)
NEUTROPHILS NFR BLD AUTO: 64 %
NRBC # BLD AUTO: 0 10E3/UL
NRBC BLD AUTO-RTO: 0 /100
PLATELET # BLD AUTO: 254 10E3/UL (ref 150–450)
POTASSIUM SERPL-SCNC: 6 MMOL/L (ref 3.4–5.3)
RBC # BLD AUTO: 5.08 10E6/UL (ref 3.8–5.2)
SODIUM SERPL-SCNC: 135 MMOL/L (ref 135–145)
WBC # BLD AUTO: 10.5 10E3/UL (ref 4–11)

## 2024-03-07 PROCEDURE — 36415 COLL VENOUS BLD VENIPUNCTURE: CPT | Performed by: EMERGENCY MEDICINE

## 2024-03-07 PROCEDURE — 99283 EMERGENCY DEPT VISIT LOW MDM: CPT

## 2024-03-07 PROCEDURE — 85025 COMPLETE CBC W/AUTO DIFF WBC: CPT | Performed by: EMERGENCY MEDICINE

## 2024-03-07 PROCEDURE — 250N000013 HC RX MED GY IP 250 OP 250 PS 637: Performed by: EMERGENCY MEDICINE

## 2024-03-07 PROCEDURE — 82374 ASSAY BLOOD CARBON DIOXIDE: CPT | Performed by: EMERGENCY MEDICINE

## 2024-03-07 RX ORDER — MECLIZINE HYDROCHLORIDE 25 MG/1
25 TABLET ORAL ONCE
Status: COMPLETED | OUTPATIENT
Start: 2024-03-07 | End: 2024-03-07

## 2024-03-07 RX ADMIN — MECLIZINE HYDROCHLORIDE 25 MG: 25 TABLET ORAL at 21:27

## 2024-03-07 ASSESSMENT — ACTIVITIES OF DAILY LIVING (ADL)
ADLS_ACUITY_SCORE: 35

## 2024-03-07 ASSESSMENT — COLUMBIA-SUICIDE SEVERITY RATING SCALE - C-SSRS
2. HAVE YOU ACTUALLY HAD ANY THOUGHTS OF KILLING YOURSELF IN THE PAST MONTH?: NO
1. IN THE PAST MONTH, HAVE YOU WISHED YOU WERE DEAD OR WISHED YOU COULD GO TO SLEEP AND NOT WAKE UP?: NO
6. HAVE YOU EVER DONE ANYTHING, STARTED TO DO ANYTHING, OR PREPARED TO DO ANYTHING TO END YOUR LIFE?: NO

## 2024-03-08 NOTE — ED TRIAGE NOTES
58 years old female came in via EMS. Pt came in due to dizziness started last Friday and got worsen tonight. Pt stated that she feels anxious when she closes her eyes due to feeling of spinning. She also states that she was unable to focus since. Pt had DM type II and was taking Ozempic. Denies and pain, fever and cough. Not on blood thinners.   Triage Assessment (Adult)       Row Name 03/07/24 6388          Triage Assessment    Airway WDL WDL        Respiratory WDL    Respiratory WDL WDL        Skin Circulation/Temperature WDL    Skin Circulation/Temperature WDL WDL        Cardiac WDL    Cardiac WDL WDL        Peripheral/Neurovascular WDL    Peripheral Neurovascular WDL WDL        Cognitive/Neuro/Behavioral WDL    Cognitive/Neuro/Behavioral WDL WDL

## 2024-03-08 NOTE — ED PROVIDER NOTES
History     Chief Complaint:  Dizziness        HPI   Vikki Medina is a 58-year-old female with history of diabetes who presents for evaluation of vertiginous dizziness.  The patient states that she had an episode of vertiginous dizziness 6 days ago that lasted 10 to 15 minutes.  Tonight, after eating dinner at work, the patient had an episode of dizziness lasting approximately 20 minutes, resolved upon arrival.  The patient states that she felt spinning dizziness at the time, did not want to close her eyes, and states that she felt panicky.  Symptoms resolved at this time.  She denies headache, vision changes, speech changes, focal weakness, or other concerns.  She does note chronic tinnitus, unchanged from previous.      Independent Historian:   none    Review of External Notes: Reviewed 8/2/2023 office visit for comprehensive review of medical issues    ROS:  Review of Systems    Allergies:  Sulfa Antibiotics     Medications:    aspirin (ASPIRIN LOW DOSE) 81 MG EC tablet  glipiZIDE (GLUCOTROL XL) 10 MG 24 hr tablet  lisinopril-hydrochlorothiazide (ZESTORETIC) 20-12.5 MG tablet  metFORMIN (GLUCOPHAGE XR) 500 MG 24 hr tablet  pravastatin (PRAVACHOL) 20 MG tablet  semaglutide (OZEMPIC, 0.25 OR 0.5 MG/DOSE,) 2 MG/3ML pen  Semaglutide, 1 MG/DOSE, (OZEMPIC) 4 MG/3ML pen        Past History:    Past Medical History:   Diagnosis Date    Delivery normal     Third degree hemorrhoids 2/22/2021         Physical Exam     Patient Vitals for the past 24 hrs:   BP Temp Temp src Pulse Resp SpO2   05/12/23 0247 113/73 97.8  F (36.6  C) Temporal 95 20 98 %        Physical Exam  Constitutional: Alert, attentive  HENT:    Nose: Nose normal.    Mouth/Throat: Oropharynx is clear, mucous membranes are moist  Eyes: EOM are normal. Pupils are equal, round, and reactive to light.   CV: Regular rate and rhythm, no murmurs, rubs or gallops.  Chest: Effort normal and breath sounds normal.   GI: No distension. There is no  tenderness  MSK: Normal range of motion.   Neurological:   A/Ox3; Cranial nerves 2-12 intact;   5/5 strength throughout the upper and lower extremities;   sensation intact to light touch throughout the upper and lower extremities;    normal fine motor coordination intact bilaterally;   normal gait   No meningismus   Skin: Skin is warm and dry.      Emergency Department Course       Emergency Department Course & Assessments:             Interventions:  Medications   meclizine (ANTIVERT) tablet 25 mg (25 mg Oral $Given 3/7/24 2127)       Disposition:  The patient was discharged to home.     Impression & Plan      Medical Decision Making:  This a pleasant 58-year-old female presents for evaluation of now resolved vertigo.  She describes short episodic vertigo today had several days ago.  She denies any vision changes, headache, or other red flag symptoms to suggest central cause.  She was observed in the department to have no concerning findings.  BMP shows hemolyzed potassium but otherwise labs are reassuring.  She would like to be discharged home and believe this very reasonable.  Plan ENT follow-up as needed and return precautions for headache, persistent vertigo, focal symptoms, or any other concerns that might suggest stroke or more concerning process.          Diagnosis:  Visit Diagnosis, Associated Orders, and Comments     ICD-10-CM    1. Vertigo  R42                 Discharge Medications:  New Prescriptions    No medications on file             Kt Hummel MD  03/07/24 1585

## 2024-03-09 DIAGNOSIS — E11.9 TYPE 2 DIABETES MELLITUS WITHOUT COMPLICATION, WITHOUT LONG-TERM CURRENT USE OF INSULIN (H): ICD-10-CM

## 2024-03-11 ENCOUNTER — TELEPHONE (OUTPATIENT)
Dept: FAMILY MEDICINE | Facility: CLINIC | Age: 59
End: 2024-03-11
Payer: COMMERCIAL

## 2024-03-11 RX ORDER — ASPIRIN 81 MG/1
TABLET ORAL
Qty: 90 TABLET | Refills: 1 | Status: SHIPPED | OUTPATIENT
Start: 2024-03-11 | End: 2024-09-11

## 2024-03-11 NOTE — TELEPHONE ENCOUNTER
Forms/Letter Request    Type of form/letter: FMLA - Unknown    Do we have the form/letter: Yes: form was faxed    Who is the form from? [employer]    Where did/will the form come from? form was faxed in    When is form/letter needed by: 4/1/24    How would you like the form/letter returned: Fax : [phone] [employer]      Form in [employer] basket @ [facility] for completion.  Patient has appointment scheduled for 4/11/24- last appointment prior was 8/2023.  Complete fmla and fax back to [employer] at [phone].    Kiki AGUIAR, - [facility]  Primary Care- [place], [place]  [facility]

## 2024-03-15 NOTE — TELEPHONE ENCOUNTER
Routing comment added to chart for permeant record:    Tata Belle MD Erickson, Emily A; [id number] hours ago (3:16 PM)     I need more information. I don t know a dx or anything about this new request.  Thank you       - Please advise, patient would need to be seen to go over dx and filling out the form. Nothing available with you until July. OK to see extender?     Colby Short RN  Patient Advocate Liaison (PAL)  [facility]  [phone]    03/15/2024 at 10:08 AM

## 2024-03-18 ENCOUNTER — HOSPITAL ENCOUNTER (EMERGENCY)
Facility: CLINIC | Age: 59
Discharge: HOME | End: 2024-03-18
Attending: EMERGENCY MEDICINE | Admitting: EMERGENCY MEDICINE
Payer: COMMERCIAL

## 2024-03-18 ENCOUNTER — TELEPHONE (OUTPATIENT)
Dept: FAMILY MEDICINE | Facility: CLINIC | Age: 59
End: 2024-03-18

## 2024-03-18 VITALS
WEIGHT: 181.88 LBS | TEMPERATURE: 98.2 F | DIASTOLIC BLOOD PRESSURE: 85 MMHG | BODY MASS INDEX: 35.71 KG/M2 | RESPIRATION RATE: 18 BRPM | HEART RATE: 89 BPM | HEIGHT: 60 IN | OXYGEN SATURATION: 99 % | SYSTOLIC BLOOD PRESSURE: 146 MMHG

## 2024-03-18 DIAGNOSIS — R42 VERTIGO: ICD-10-CM

## 2024-03-18 DIAGNOSIS — H61.22 IMPACTED CERUMEN OF LEFT EAR: ICD-10-CM

## 2024-03-18 DIAGNOSIS — H73.91 ABNORMAL TYMPANIC MEMBRANE OF RIGHT EAR: ICD-10-CM

## 2024-03-18 DIAGNOSIS — R11.0 NAUSEA: ICD-10-CM

## 2024-03-18 LAB
ALBUMIN SERPL BCG-MCNC: 4.6 G/DL (ref 3.5–5.2)
ALP SERPL-CCNC: 92 U/L (ref 40–150)
ALT SERPL W P-5'-P-CCNC: 16 U/L (ref 0–50)
ANION GAP SERPL CALCULATED.3IONS-SCNC: 12 MMOL/L (ref 7–15)
AST SERPL W P-5'-P-CCNC: 21 U/L (ref 0–45)
BASOPHILS # BLD AUTO: 0 10E3/UL (ref 0–0.2)
BASOPHILS NFR BLD AUTO: 0 %
BILIRUB SERPL-MCNC: <0.2 MG/DL
BUN SERPL-MCNC: 22 MG/DL (ref 6–20)
CALCIUM SERPL-MCNC: 9.8 MG/DL (ref 8.6–10)
CHLORIDE SERPL-SCNC: 101 MMOL/L (ref 98–107)
CREAT SERPL-MCNC: 0.83 MG/DL (ref 0.51–0.95)
DEPRECATED HCO3 PLAS-SCNC: 26 MMOL/L (ref 22–29)
EGFRCR SERPLBLD CKD-EPI 2021: 82 ML/MIN/1.73M2
EOSINOPHIL # BLD AUTO: 0.4 10E3/UL (ref 0–0.7)
EOSINOPHIL NFR BLD AUTO: 3 %
ERYTHROCYTE [DISTWIDTH] IN BLOOD BY AUTOMATED COUNT: 12.3 % (ref 10–15)
GLUCOSE BLDC GLUCOMTR-MCNC: 192 MG/DL (ref 70–99)
GLUCOSE SERPL-MCNC: 143 MG/DL (ref 70–99)
HBA1C MFR BLD: 6.7 %
HCT VFR BLD AUTO: 41.1 % (ref 35–47)
HGB BLD-MCNC: 13.5 G/DL (ref 11.7–15.7)
IMM GRANULOCYTES # BLD: 0 10E3/UL
IMM GRANULOCYTES NFR BLD: 0 %
LIPASE SERPL-CCNC: 72 U/L (ref 13–60)
LYMPHOCYTES # BLD AUTO: 2.5 10E3/UL (ref 0.8–5.3)
LYMPHOCYTES NFR BLD AUTO: 24 %
MCH RBC QN AUTO: 29.4 PG (ref 26.5–33)
MCHC RBC AUTO-ENTMCNC: 32.8 G/DL (ref 31.5–36.5)
MCV RBC AUTO: 90 FL (ref 78–100)
MONOCYTES # BLD AUTO: 0.4 10E3/UL (ref 0–1.3)
MONOCYTES NFR BLD AUTO: 4 %
NEUTROPHILS # BLD AUTO: 7.2 10E3/UL (ref 1.6–8.3)
NEUTROPHILS NFR BLD AUTO: 69 %
NRBC # BLD AUTO: 0 10E3/UL
NRBC BLD AUTO-RTO: 0 /100
PLATELET # BLD AUTO: 265 10E3/UL (ref 150–450)
POTASSIUM SERPL-SCNC: 4.1 MMOL/L (ref 3.4–5.3)
PROT SERPL-MCNC: 7.9 G/DL (ref 6.4–8.3)
RBC # BLD AUTO: 4.59 10E6/UL (ref 3.8–5.2)
SODIUM SERPL-SCNC: 139 MMOL/L (ref 135–145)
WBC # BLD AUTO: 10.5 10E3/UL (ref 4–11)

## 2024-03-18 PROCEDURE — 36415 COLL VENOUS BLD VENIPUNCTURE: CPT | Performed by: EMERGENCY MEDICINE

## 2024-03-18 PROCEDURE — 99284 EMERGENCY DEPT VISIT MOD MDM: CPT

## 2024-03-18 PROCEDURE — 250N000011 HC RX IP 250 OP 636: Performed by: EMERGENCY MEDICINE

## 2024-03-18 PROCEDURE — 82247 BILIRUBIN TOTAL: CPT | Performed by: EMERGENCY MEDICINE

## 2024-03-18 PROCEDURE — 82962 GLUCOSE BLOOD TEST: CPT

## 2024-03-18 PROCEDURE — 83036 HEMOGLOBIN GLYCOSYLATED A1C: CPT | Performed by: EMERGENCY MEDICINE

## 2024-03-18 PROCEDURE — 99283 EMERGENCY DEPT VISIT LOW MDM: CPT

## 2024-03-18 PROCEDURE — 83690 ASSAY OF LIPASE: CPT | Performed by: EMERGENCY MEDICINE

## 2024-03-18 PROCEDURE — 85025 COMPLETE CBC W/AUTO DIFF WBC: CPT | Performed by: EMERGENCY MEDICINE

## 2024-03-18 RX ORDER — ONDANSETRON 4 MG/1
4 TABLET, ORALLY DISINTEGRATING ORAL ONCE
Status: COMPLETED | OUTPATIENT
Start: 2024-03-18 | End: 2024-03-18

## 2024-03-18 RX ORDER — ONDANSETRON 4 MG/1
4 TABLET, ORALLY DISINTEGRATING ORAL EVERY 6 HOURS PRN
Qty: 10 TABLET | Refills: 0 | Status: SHIPPED | OUTPATIENT
Start: 2024-03-18 | End: 2024-03-21

## 2024-03-18 RX ORDER — MECLIZINE HYDROCHLORIDE 25 MG/1
25-50 TABLET ORAL 3 TIMES DAILY PRN
Qty: 15 TABLET | Refills: 0 | Status: SHIPPED | OUTPATIENT
Start: 2024-03-18 | End: 2024-10-03

## 2024-03-18 RX ADMIN — ONDANSETRON 4 MG: 4 TABLET, ORALLY DISINTEGRATING ORAL at 20:58

## 2024-03-18 ASSESSMENT — COLUMBIA-SUICIDE SEVERITY RATING SCALE - C-SSRS
1. IN THE PAST MONTH, HAVE YOU WISHED YOU WERE DEAD OR WISHED YOU COULD GO TO SLEEP AND NOT WAKE UP?: NO
2. HAVE YOU ACTUALLY HAD ANY THOUGHTS OF KILLING YOURSELF IN THE PAST MONTH?: NO
6. HAVE YOU EVER DONE ANYTHING, STARTED TO DO ANYTHING, OR PREPARED TO DO ANYTHING TO END YOUR LIFE?: NO

## 2024-03-18 ASSESSMENT — ACTIVITIES OF DAILY LIVING (ADL)
ADLS_ACUITY_SCORE: 33
ADLS_ACUITY_SCORE: 33

## 2024-03-18 NOTE — TELEPHONE ENCOUNTER
Forms/Letter Request    Type of form/letter: FMLA - Unknown      Do we have the form/letter: Yes:     Who is the form from? Patient    Where did/will the form come from? Patient or family brought in       When is form/letter needed by: 3/22/2024    How would you like the form/letter returned: Fax : [phone]    Patient Notified form requests are processed in 5-7 business days:Yes    Could we send this information to you in [facility] [employer] or would you prefer to receive a phone call?:   Patient would prefer a phone call   Okay to leave a detailed message?: Yes at Home number on file [phone] (home)   Shreya Shah Patient Representative

## 2024-03-19 NOTE — ED TRIAGE NOTES
Patient reports nausea for 1 hour that is intermittent.  Patient states she is on Ozempic so she has been having issues with her blood sugar but it was 114 when she checked it.      Triage Assessment (Adult)       Row Name 03/18/24 2051          Triage Assessment    Airway WDL WDL        Respiratory WDL    Respiratory WDL WDL        Skin Circulation/Temperature WDL    Skin Circulation/Temperature WDL WDL        Cardiac WDL    Cardiac WDL WDL

## 2024-03-19 NOTE — ED PROVIDER NOTES
History     Chief Complaint:  Nausea    The history is provided by the patient.      Vikki Medina is a 58 year old female with a history of type 2 diabetes mellitus, hyperlipidemia, CKD stage 2, and hypertension who presents to the ED for evaluation of nausea. The patient was seen on 3/7 after a short episode of vertigo. She was given meclizine, which helped her symptoms resolve but she did not like it. The patient started having episodes of nausea at night 2 weeks ago. This causes the patient anxiety. She also notes getting dizzy and having ringing in her ears. Also, the patient's blood sugars have been running low. The lowest was 57. She has been taking ozempic since April of 2023. She denies vomiting, abdominal pain, nasal drainage, sore throat, chest pain, or chest tightness.  States the medication here she liked better than the medication before.    Independent Historian:   None - Patient Only    Review of External Notes:   ED note reviewed - no prescriptions at discharge    Medications:    Aspirin 81 mg  Glipizide  Lisinopril-hydrochlorothiazide  Metformin  Pravastatin  Semaglutide    Past Medical History:    Third degree hemorrhoids  Vitamin D deficiency  Adenomatous colon polyp  Type 2 diabetes mellitus   Morbid obesity  Hyperlipidemia   CKD stage 2  Hypertension   HPV positive    Past Surgical History:    Colonoscopy  Benign tumor removed from left arm  Tubal ligation     Physical Exam     Patient Vitals for the past 24 hrs:   BP Temp Temp src Pulse Resp SpO2 Height Weight   03/18/24 2318 -- -- -- -- -- 99 % -- --   03/18/24 2317 -- -- -- -- -- 98 % -- --   03/18/24 2316 -- -- -- -- -- 96 % -- --   03/18/24 2315 (!) 146/85 -- -- 89 -- -- -- --   03/18/24 2053 (!) 168/88 98.2  F (36.8  C) Oral 102 18 99 % 1.524 m (5') 82.5 kg (181 lb 14.1 oz)     Physical Exam  Eyes:               Sclera white; Pupils are equal and round  ENT:                External ears and nares normal, R TM w/thickened  appearance different than OM or effusion; L TM obscured by cerumen  Resp:               Non-labored, no retractions or accessory muscle use  GI:                   Abdomen is soft, non-tender, non-distended                          No rebound tenderness or peritoneal features  MS:                  Moves all extremities  Skin:                Warm and dry  Neuro:             Speech is normal and fluent. No apparent deficit.         Emergency Department Course   Imaging:  No orders to display     Laboratory:  Labs Ordered and Resulted from Time of ED Arrival to Time of ED Departure   GLUCOSE BY METER - Abnormal       Result Value    GLUCOSE BY METER POCT 192 (*)    COMPREHENSIVE METABOLIC PANEL - Abnormal    Sodium 139      Potassium 4.1      Carbon Dioxide (CO2) 26      Anion Gap 12      Urea Nitrogen 22.0 (*)     Creatinine 0.83      GFR Estimate 82      Calcium 9.8      Chloride 101      Glucose 143 (*)     Alkaline Phosphatase 92      AST 21      ALT 16      Protein Total 7.9      Albumin 4.6      Bilirubin Total <0.2     LIPASE - Abnormal    Lipase 72 (*)    HEMOGLOBIN A1C - Abnormal    Hemoglobin A1C 6.7 (*)    CBC WITH PLATELETS AND DIFFERENTIAL    WBC Count 10.5      RBC Count 4.59      Hemoglobin 13.5      Hematocrit 41.1      MCV 90      MCH 29.4      MCHC 32.8      RDW 12.3      Platelet Count 265      % Neutrophils 69      % Lymphocytes 24      % Monocytes 4      % Eosinophils 3      % Basophils 0      % Immature Granulocytes 0      NRBCs per 100 WBC 0      Absolute Neutrophils 7.2      Absolute Lymphocytes 2.5      Absolute Monocytes 0.4      Absolute Eosinophils 0.4      Absolute Basophils 0.0      Absolute Immature Granulocytes 0.0      Absolute NRBCs 0.0       Emergency Department Course & Assessments:    Interventions:  Medications   ondansetron (ZOFRAN ODT) ODT tab 4 mg (4 mg Oral $Given 3/18/24 2058)     Assessments:  2129 I obtained history and examined the patient as noted above.     Independent  Interpretation (X-rays, CTs, rhythm strip):  No    Consultations/Discussion of Management or Tests:  NA        Social Determinants of Health affecting care:   No    Disposition:  Discharge    Impression & Plan    Medical Decision Making:     Vikki Medina is a 58 year old female who presents for evaluation of dizziness c/w vertigo. The differential diagnosis of vertigo is broad and includes common etiologies such as menieres disease, labyrinthitis, benign positional vertigo, otitis media, etc.  More serious etiologies considered include central etiologies such as tumor, intracerebral bleed, dissection, ischemic cerebral vascular accident.  The history, physical exam including detailed neurologic exam, and workup in the emergency room suggests a benign cause of vertigo today.  No indication for advanced imaging at this point (CT/MRI) as there are no definite signs of a central and concerning etiology for the vertigo.  With abnormal chronic appearing changes to R TM and impacted cerumen on L, would benefit from ENT follow-up.  Debrox to soften cerumen.  Phone number provided.  Felt like Zofran helped.  Prescribed with meclizine and zofran.       Ozempic side effect also considered.  Has been on it ~11 months.  No associated evidence for hepatitis or lab derangements.  Felt less likely as source of vertigo.  Has concerns about her blood sugar monitoring.  Follow-up with PCP.  HgA1c improving.    Diagnosis:    ICD-10-CM    1. Nausea  R11.0       2. Vertigo  R42       3. Impacted cerumen of left ear  H61.22       4. Abnormal tympanic membrane of right ear  H73.91            Discharge Medications:  Discharge Medication List as of 3/18/2024 11:14 PM      START taking these medications    Details   carbamide peroxide (DEBROX) 6.5 % otic solution Place 5 drops Into the left ear 2 times daily for 5 days, Disp-15 mL, R-0, E-Prescribe      meclizine (ANTIVERT) 25 MG tablet Take 1-2 tablets (25-50 mg) by mouth 3 times  daily as needed for dizziness, Disp-15 tablet, R-0, E-Prescribe      ondansetron (ZOFRAN ODT) 4 MG ODT tab Take 1 tablet (4 mg) by mouth every 6 hours as needed for nausea or vomiting, Disp-10 tablet, R-0, E-Prescribe           Scribe Disclosure:  I, Baileylorraine Yung, am serving as a scribe at 9:43 PM on 3/18/2024 to document services personally performed by Bonnie Coy MD based on my observations and the provider's statements to me.   3/18/2024   Bonnie Coy MD Gosen, Christine Leigh, MD  03/19/24 8544

## 2024-03-19 NOTE — TELEPHONE ENCOUNTER
Pt is already scheduled to see Dr Henley on 3/21/24 in . Routing to  requesting they fax the forms from [facility] to  at [phone] so forms can be placed in provider in basket for appt.    Emily Dawson

## 2024-03-20 ENCOUNTER — TELEPHONE (OUTPATIENT)
Dept: FAMILY MEDICINE | Facility: CLINIC | Age: 59
End: 2024-03-20

## 2024-03-20 NOTE — TELEPHONE ENCOUNTER
Advised of below, pt verbalized understanding and agreeable to plan.    Pt would like to know if [facility] paperwork has been faxed to [employer]?  Routed to TC's, please look into this and let pt know.    Taisha Xie RN, BSN  [facility]

## 2024-03-20 NOTE — TELEPHONE ENCOUNTER
"Note per Dr. Belle concerning ED visit 3/18/2024:    \"Tata Belle MD  P  Triage  Recommend stopping 1 of the glipizide, as her sugars have been running low. If this continues, then stop both tabs of glipize. This is what is causing her sugars to drop.\"    LMOMARB.    Treasure Eric RN    "

## 2024-03-21 ENCOUNTER — OFFICE VISIT (OUTPATIENT)
Dept: FAMILY MEDICINE | Facility: CLINIC | Age: 59
End: 2024-03-21
Payer: COMMERCIAL

## 2024-03-21 VITALS
OXYGEN SATURATION: 99 % | BODY MASS INDEX: 35.53 KG/M2 | HEIGHT: 60 IN | WEIGHT: 181 LBS | HEART RATE: 92 BPM | RESPIRATION RATE: 16 BRPM | DIASTOLIC BLOOD PRESSURE: 79 MMHG | SYSTOLIC BLOOD PRESSURE: 117 MMHG

## 2024-03-21 DIAGNOSIS — R42 DIZZINESS: ICD-10-CM

## 2024-03-21 DIAGNOSIS — E11.65 TYPE 2 DIABETES MELLITUS WITH HYPERGLYCEMIA, WITHOUT LONG-TERM CURRENT USE OF INSULIN (H): Primary | ICD-10-CM

## 2024-03-21 DIAGNOSIS — H93.13 TINNITUS, BILATERAL: ICD-10-CM

## 2024-03-21 PROCEDURE — 99213 OFFICE O/P EST LOW 20 MIN: CPT | Performed by: FAMILY MEDICINE

## 2024-03-21 RX ORDER — ONDANSETRON 4 MG/1
4 TABLET, ORALLY DISINTEGRATING ORAL EVERY 6 HOURS PRN
Qty: 20 TABLET | Refills: 0 | Status: SHIPPED | OUTPATIENT
Start: 2024-03-21 | End: 2024-10-03

## 2024-03-21 ASSESSMENT — ENCOUNTER SYMPTOMS
CONSTITUTIONAL NEGATIVE: 1
DIZZINESS: 1
LIGHT-HEADEDNESS: 0

## 2024-03-21 NOTE — PROGRESS NOTES
Assessment and Plan    (E11.65) Type 2 diabetes mellitus with hyperglycemia, without long-term current use of insulin (H)  (primary encounter diagnosis)  Comment:   Plan: Continuous Blood Gluc Sensor (FREESTYLE NELY 2        SENSOR) MISC, Continuous Blood Gluc          (FREESTYLE NELY 2 READER) CLEMENTINE    (H93.13) Tinnitus, bilateral  Comment: Longstanding, distinct from dizziness  Plan: Adult ENT  Referral, ondansetron         (ZOFRAN ODT) 4 MG ODT tab            (R42) Dizziness  Comment: if resolving may not need any further w/unit(s), referral for PRn  Plan: Adult ENT  Referral, ondansetron         (ZOFRAN ODT) 4 MG ODT tab              RTC in 1m    Eleno Henley MD      Nisreen Florez is a 58 year old, presenting for the following health issues:  ER F/U        3/21/2024     2:41 PM   Additional Questions   Roomed by kb     Via the Health Maintenance questionnaire, the patient has reported the following services have been completed -Eye Exam, this information has been sent to the abstraction team.  HPI       ED/UC Followup:    Facility:  [facility]  Date of visit: 3/18/2024  Reason for visit: Nausea, Vertigo, Impacted cerumen of left ear, Abnormal tympanic membrane of right ear  Current Status: 'feeling a little better'    Vertigo is better, but has been happening randomly throughout the day.  Noting more ringing in her ears today, but tinnitus has been present for years.  Was prescribed meclaine and zofran for vertigo, because she reported that she was having vertigo, but denies having vertigo.  Will often have dizziness and that her eye want to move.  This happens when sitting and resting quitely.  Feels like if she would get up and walk around it would be better.  Denies room spinning.  No palpitions or shortness of breath.  NO vision changes.    Feels she's been having some low blood sugar, was advised to stop glipizide.    Has form for work to complete    Requesting CGM    Review  of Systems   Constitutional: Negative.    HENT:  Positive for tinnitus.    Neurological:  Positive for dizziness. Negative for light-headedness.          Objective    /79   Pulse 92   Resp 16   Ht 1.524 m (5')   Wt 82.1 kg (181 lb)   SpO2 99%   BMI 35.35 kg/m    Body mass index is 35.35 kg/m .  Physical Exam  Vitals reviewed.   HENT:      Head: Normocephalic and atraumatic.   Eyes:      Conjunctiva/sclera: Conjunctivae normal.   Cardiovascular:      Rate and Rhythm: Normal rate and regular rhythm.      Heart sounds: Normal heart sounds.   Pulmonary:      Effort: Pulmonary effort is normal.      Breath sounds: Normal breath sounds.   Skin:     General: Skin is warm and dry.   Neurological:      Mental Status: She is alert and oriented to person, place, and time.      Comments: Normal Loreto Hallpike DIMITRIOS   Psychiatric:         Mood and Affect: Mood normal.         Behavior: Behavior normal.              Signed Electronically by: Eleno Henley MD

## 2024-03-25 ENCOUNTER — TELEPHONE (OUTPATIENT)
Dept: OTOLARYNGOLOGY | Facility: CLINIC | Age: 59
End: 2024-03-25
Payer: COMMERCIAL

## 2024-03-25 DIAGNOSIS — E11.65 TYPE 2 DIABETES MELLITUS WITH HYPERGLYCEMIA, WITHOUT LONG-TERM CURRENT USE OF INSULIN (H): ICD-10-CM

## 2024-03-25 RX ORDER — SEMAGLUTIDE 1.34 MG/ML
INJECTION, SOLUTION SUBCUTANEOUS
Qty: 12 ML | Refills: 1 | Status: SHIPPED | OUTPATIENT
Start: 2024-03-25 | End: 2024-06-23

## 2024-03-25 NOTE — TELEPHONE ENCOUNTER
[facility] Call [facility]    Phone Message    May a detailed message be left on voicemail: yes     Reason for Call: Other: New Vestibular - referral from Eleno Henley for Tinnitus and Dizziness.  [facility] location, Thanks      Action Taken: Other: ENT    Travel Screening: Not Applicable

## 2024-03-26 NOTE — TELEPHONE ENCOUNTER
Spoke w/ Allison at [facility] and requested a new copy be sent to [facility] as incorrect provider is listed. They will be faxing a copy. Upon receiving please place in Dr Henley's box (forms were discussed in last OV)    Emily Dawson

## 2024-03-26 NOTE — TELEPHONE ENCOUNTER
Patient called and stated that parts B and D of the forms need to have more elaboration. Flagged areas and placed in provider basket for review.     Eliana Chambers  Lead   [facility] Nicolasa Dawson

## 2024-03-26 NOTE — TELEPHONE ENCOUNTER
Left Voicemail (1st Attempt) for the patient to call back and schedule the following:    Appointment type:  NEW VESTIBULAR  Provider: UNKNOWN  Return date: NEXT AVAILABLE  Specialty phone number: DIRECT  Additional appointment(s) needed:   Additonal Notes: gave direct number as I am the only one able to schedule for this

## 2024-03-27 ENCOUNTER — TELEPHONE (OUTPATIENT)
Dept: FAMILY MEDICINE | Facility: CLINIC | Age: 59
End: 2024-03-27
Payer: COMMERCIAL

## 2024-03-27 NOTE — TELEPHONE ENCOUNTER
Rec'd FMLA from [facility] [employer]. Placed in PCP basket for review and signature. Fax [phone].    Dee Dawson

## 2024-03-29 ENCOUNTER — TELEPHONE (OUTPATIENT)
Dept: OTOLARYNGOLOGY | Facility: CLINIC | Age: 59
End: 2024-03-29
Payer: COMMERCIAL

## 2024-03-29 NOTE — TELEPHONE ENCOUNTER
[facility] Call [facility]    Phone Message    May a detailed message be left on voicemail: yes     Reason for Call: Other: New Vestibular, Referral for Dizziness. Please contact pt for scheduling.  [facility] location preferred. Thanks     Action Taken: Other: ENT    Travel Screening: Not Applicable

## 2024-04-02 NOTE — TELEPHONE ENCOUNTER
"Received call from [employer] needing a correction to patients FMLA:    See section E #2 (Absence for Treatment with HCP):    2. How long will the appointments last? Currently states \"3m\" however they need to know how long a single appt/treatment will last NOT the entirety of treatment.     *Please correct and initial corrections.  Forms need to be faxed by 4/9/24.  When completed copy and fax.*    Kiki AGUIAR, - [facility]  Primary Care- [facility], [facility] [facility]           "

## 2024-04-05 NOTE — TELEPHONE ENCOUNTER
T has called in because she states that the insurance company did not get the fax.     FMLA form will be re-faxed to another fax number of: [phone].     Forms have also been sent to the [employer] .    Dee Dawson

## 2024-04-16 DIAGNOSIS — I10 ESSENTIAL HYPERTENSION: ICD-10-CM

## 2024-04-16 RX ORDER — LISINOPRIL AND HYDROCHLOROTHIAZIDE 12.5; 2 MG/1; MG/1
2 TABLET ORAL DAILY
Qty: 180 TABLET | Refills: 1 | Status: SHIPPED | OUTPATIENT
Start: 2024-04-16 | End: 2024-10-03

## 2024-05-01 ENCOUNTER — OFFICE VISIT (OUTPATIENT)
Dept: FAMILY MEDICINE | Facility: CLINIC | Age: 59
End: 2024-05-01
Payer: COMMERCIAL

## 2024-05-01 VITALS
RESPIRATION RATE: 20 BRPM | HEIGHT: 60 IN | DIASTOLIC BLOOD PRESSURE: 73 MMHG | HEART RATE: 98 BPM | BODY MASS INDEX: 34.47 KG/M2 | OXYGEN SATURATION: 97 % | TEMPERATURE: 98.1 F | SYSTOLIC BLOOD PRESSURE: 121 MMHG | WEIGHT: 175.6 LBS

## 2024-05-01 DIAGNOSIS — F41.9 ANXIETY: ICD-10-CM

## 2024-05-01 DIAGNOSIS — E11.65 TYPE 2 DIABETES MELLITUS WITH HYPERGLYCEMIA, WITHOUT LONG-TERM CURRENT USE OF INSULIN (H): Primary | ICD-10-CM

## 2024-05-01 DIAGNOSIS — N18.2 CKD (CHRONIC KIDNEY DISEASE) STAGE 2, GFR 60-89 ML/MIN: ICD-10-CM

## 2024-05-01 PROCEDURE — 99214 OFFICE O/P EST MOD 30 MIN: CPT | Performed by: FAMILY MEDICINE

## 2024-05-01 ASSESSMENT — ANXIETY QUESTIONNAIRES
GAD7 TOTAL SCORE: 0
5. BEING SO RESTLESS THAT IT IS HARD TO SIT STILL: NOT AT ALL
IF YOU CHECKED OFF ANY PROBLEMS ON THIS QUESTIONNAIRE, HOW DIFFICULT HAVE THESE PROBLEMS MADE IT FOR YOU TO DO YOUR WORK, TAKE CARE OF THINGS AT HOME, OR GET ALONG WITH OTHER PEOPLE: NOT DIFFICULT AT ALL
2. NOT BEING ABLE TO STOP OR CONTROL WORRYING: NOT AT ALL
3. WORRYING TOO MUCH ABOUT DIFFERENT THINGS: NOT AT ALL
GAD7 TOTAL SCORE: 0
1. FEELING NERVOUS, ANXIOUS, OR ON EDGE: NOT AT ALL
7. FEELING AFRAID AS IF SOMETHING AWFUL MIGHT HAPPEN: NOT AT ALL
6. BECOMING EASILY ANNOYED OR IRRITABLE: NOT AT ALL

## 2024-05-01 ASSESSMENT — PATIENT HEALTH QUESTIONNAIRE - PHQ9
SUM OF ALL RESPONSES TO PHQ QUESTIONS 1-9: 0
5. POOR APPETITE OR OVEREATING: NOT AT ALL

## 2024-05-01 NOTE — PROGRESS NOTES
Assessment & Plan   Problem List Items Addressed This Visit       Anxiety     Patient is terrified of hypoglycemia.  She has called 911 and gone to the ED twice for hypoglycemia.  When she feels that her blood sugar is low, she checks her blood sugar as frequently as every 10 minutes.  She reports that she has had 2 episodes recently of glucose 50 on semaglutide alone (theoretically does not occur).  She does not understand why her blood sugar is not steady persistently.  Discussed blood sugar variability and normal range.  We shall stop metformin semaglutide only         CKD (chronic kidney disease) stage 2, GFR 60-89 ml/min     GFR Estimate   Date Value Ref Range Status   03/18/2024 82 >60 mL/min/1.73m2 Final   07/05/2021 89 >60 mL/min/[1.73_m2] Final     Comment:     Non  GFR Calc  Starting 12/18/2018, serum creatinine based estimated GFR (eGFR) will be   calculated using the Chronic Kidney Disease Epidemiology Collaboration   (CKD-EPI) equation.       GFR Estimate If Black   Date Value Ref Range Status   07/05/2021 >90 >60 mL/min/[1.73_m2] Final     Comment:      GFR Calc  Starting 12/18/2018, serum creatinine based estimated GFR (eGFR) will be   calculated using the Chronic Kidney Disease Epidemiology Collaboration   (CKD-EPI) equation.     On ACE           Type 2 diabetes mellitus with hyperglycemia, without long-term current use of insulin (H) - Primary     CGM shows average blood sugar over the last month since cessation of sulfonylurea and metformin  146.  Discussed that this is excellent control.  She has never been to diabetic education..  She has been treating her hypoglycemia with hard candy and crackers.  Refer to diabetic education.  Recommend glucose tablets for hypoglycemia.  Discussed glucagon which she cannot take to her work         Relevant Orders    Adult Diabetes Education  Referral               BMI  Estimated body mass index is 34.29 kg/m  as  calculated from the following:    Height as of this encounter: 1.524 m (5').    Weight as of this encounter: 79.7 kg (175 lb 9.6 oz).   Weight management plan: Semaglutide therapy          Nisreen Florez is a 58 year old, presenting for the following health issues:  Diabetes    History of Present Illness       Diabetes:   She presents for follow up of diabetes.  She is checking home blood glucose four or more times daily.   She checks blood glucose before meals, after meals, before and after meals and at bedtime.  Blood glucose is never over 200 and sometimes under 70. She is aware of hypoglycemia symptoms including shakiness and dizziness.   She is concerned about low blood sugar, several less than 70 in the past few weeks.    She is not experiencing numbness or burning in feet, excessive thirst, blurry vision, weight changes or redness, sores or blisters on feet.           She eats 2-3 servings of fruits and vegetables daily.She consumes 0 sweetened beverage(s) daily.She exercises with enough effort to increase her heart rate 20 to 29 minutes per day.  She exercises with enough effort to increase her heart rate 3 or less days per week.   She is taking medications regularly.         Medication Followup of Ozempic   Taking Medication as prescribed: yes  Side Effects:  low blood sugar   Medication Helping Symptoms:  yes, patient states it is worried that it is making her blood sugar too low.       ROS as described.  No systemic symptoms      Objective    /73 (BP Location: Right arm, Patient Position: Sitting, Cuff Size: Adult Regular)   Pulse 98   Temp 98.1  F (36.7  C) (Oral)   Resp 20   Ht 1.524 m (5')   Wt 79.7 kg (175 lb 9.6 oz)   SpO2 97%   BMI 34.29 kg/m    Body mass index is 34.29 kg/m .  Physical Exam   Worried            Signed Electronically by: Fernando Joseph MD

## 2024-05-02 PROBLEM — F41.9 ANXIETY: Status: ACTIVE | Noted: 2024-05-02

## 2024-05-02 NOTE — ASSESSMENT & PLAN NOTE
GFR Estimate   Date Value Ref Range Status   03/18/2024 82 >60 mL/min/1.73m2 Final   07/05/2021 89 >60 mL/min/[1.73_m2] Final     Comment:     Non  GFR Calc  Starting 12/18/2018, serum creatinine based estimated GFR (eGFR) will be   calculated using the Chronic Kidney Disease Epidemiology Collaboration   (CKD-EPI) equation.       GFR Estimate If Black   Date Value Ref Range Status   07/05/2021 >90 >60 mL/min/[1.73_m2] Final     Comment:      GFR Calc  Starting 12/18/2018, serum creatinine based estimated GFR (eGFR) will be   calculated using the Chronic Kidney Disease Epidemiology Collaboration   (CKD-EPI) equation.     On ACE

## 2024-05-02 NOTE — ASSESSMENT & PLAN NOTE
CGM shows average blood sugar over the last month since cessation of sulfonylurea and metformin  146.  Discussed that this is excellent control.  She has never been to diabetic education..  She has been treating her hypoglycemia with hard candy and crackers.  Refer to diabetic education.  Recommend glucose tablets for hypoglycemia.  Discussed glucagon which she cannot take to her work

## 2024-05-02 NOTE — ASSESSMENT & PLAN NOTE
Patient is terrified of hypoglycemia.  She has called 911 and gone to the ED twice for hypoglycemia.  When she feels that her blood sugar is low, she checks her blood sugar as frequently as every 10 minutes.  She reports that she has had 2 episodes recently of glucose 50 on semaglutide alone (theoretically does not occur).  She does not understand why her blood sugar is not steady persistently.  Discussed blood sugar variability and normal range.  We shall stop metformin semaglutide only

## 2024-05-09 DIAGNOSIS — E11.65 TYPE 2 DIABETES MELLITUS WITH HYPERGLYCEMIA, WITHOUT LONG-TERM CURRENT USE OF INSULIN (H): ICD-10-CM

## 2024-05-09 RX ORDER — SEMAGLUTIDE 0.68 MG/ML
0.5 INJECTION, SOLUTION SUBCUTANEOUS
Qty: 3 ML | Refills: 1 | Status: SHIPPED | OUTPATIENT
Start: 2024-05-09 | End: 2024-07-11

## 2024-05-16 ENCOUNTER — PATIENT OUTREACH (OUTPATIENT)
Dept: GASTROENTEROLOGY | Facility: CLINIC | Age: 59
End: 2024-05-16
Payer: COMMERCIAL

## 2024-05-16 DIAGNOSIS — Z12.11 SPECIAL SCREENING FOR MALIGNANT NEOPLASMS, COLON: Primary | ICD-10-CM

## 2024-05-16 NOTE — PROGRESS NOTES
CRC Screening Colonoscopy Referral Review    Patient meets the inclusion criteria for screening colonoscopy standing order.    Ordering/Referring Provider:  Tata Belle      BMI: Estimated body mass index is 34.29 kg/m  as calculated from the following:    Height as of 5/1/24: 1.524 m (5').    Weight as of 5/1/24: 79.7 kg (175 lb 9.6 oz).     Sedation:  Does patient have any of the following conditions affecting sedation?  No medical conditions affecting sedation.    Previous Scopes:  Any previous recommendations or follow up needs based on previous scope?  na / No recommendations.    Medical Concerns to Postpone Order:  Does patient have any of the following medical concerns that should postpone/delay colonoscopy referral?  No medical conditions affecting colonoscopy referral.    Final Referral Details:  Based on patient's medical history patient is appropriate for referral order with moderate sedation. If patient's BMI > 50 do not schedule in [facility].

## 2024-06-17 ENCOUNTER — ANCILLARY PROCEDURE (OUTPATIENT)
Dept: MAMMOGRAPHY | Facility: CLINIC | Age: 59
End: 2024-06-17
Attending: FAMILY MEDICINE
Payer: COMMERCIAL

## 2024-06-17 DIAGNOSIS — Z12.31 VISIT FOR SCREENING MAMMOGRAM: ICD-10-CM

## 2024-06-17 PROCEDURE — 77067 SCR MAMMO BI INCL CAD: CPT | Mod: TC | Performed by: RADIOLOGY

## 2024-06-17 PROCEDURE — 77063 BREAST TOMOSYNTHESIS BI: CPT | Mod: TC | Performed by: RADIOLOGY

## 2024-06-21 DIAGNOSIS — E11.65 TYPE 2 DIABETES MELLITUS WITH HYPERGLYCEMIA, WITHOUT LONG-TERM CURRENT USE OF INSULIN (H): ICD-10-CM

## 2024-06-21 RX ORDER — GLIPIZIDE 10 MG/1
10 TABLET, FILM COATED, EXTENDED RELEASE ORAL DAILY
Qty: 90 TABLET | Refills: 1 | OUTPATIENT
Start: 2024-06-21

## 2024-06-21 NOTE — TELEPHONE ENCOUNTER
Dr. Yoshi yoder. Can we check in with patient on what she is taking for her diabetes? Unsure how this is being refilled with glipizide not on medication list.     ARIELLE Bryson CNP

## 2024-06-21 NOTE — TELEPHONE ENCOUNTER
Pt returned call  She does not take glipizide, she only takes Georgiana Simmons RN on 6/21/2024 at 4:04 PM

## 2024-07-09 DIAGNOSIS — E78.5 HYPERLIPIDEMIA WITH TARGET LDL LESS THAN 100: ICD-10-CM

## 2024-07-09 RX ORDER — PRAVASTATIN SODIUM 20 MG
20 TABLET ORAL DAILY
Qty: 90 TABLET | Refills: 0 | Status: SHIPPED | OUTPATIENT
Start: 2024-07-09 | End: 2024-10-03

## 2024-07-11 DIAGNOSIS — E11.65 TYPE 2 DIABETES MELLITUS WITH HYPERGLYCEMIA, WITHOUT LONG-TERM CURRENT USE OF INSULIN (H): ICD-10-CM

## 2024-07-11 RX ORDER — SEMAGLUTIDE 0.68 MG/ML
0.5 INJECTION, SOLUTION SUBCUTANEOUS
Qty: 3 ML | Refills: 0 | Status: SHIPPED | OUTPATIENT
Start: 2024-07-11

## 2024-07-11 NOTE — TELEPHONE ENCOUNTER
Prescription approved per [facility] Refill Protocol.  Gladys Calles, RN  [facility] Triage Nurse

## 2024-08-04 ENCOUNTER — HEALTH MAINTENANCE LETTER (OUTPATIENT)
Age: 59
End: 2024-08-04

## 2024-09-05 ENCOUNTER — PATIENT OUTREACH (OUTPATIENT)
Dept: FAMILY MEDICINE | Facility: CLINIC | Age: 59
End: 2024-09-05
Payer: COMMERCIAL

## 2024-09-05 DIAGNOSIS — R87.810 CERVICAL HIGH RISK HPV (HUMAN PAPILLOMAVIRUS) TEST POSITIVE: Primary | ICD-10-CM

## 2024-09-11 DIAGNOSIS — E11.9 TYPE 2 DIABETES MELLITUS WITHOUT COMPLICATION, WITHOUT LONG-TERM CURRENT USE OF INSULIN (H): ICD-10-CM

## 2024-09-11 RX ORDER — ASPIRIN 81 MG/1
TABLET ORAL
Qty: 90 TABLET | Refills: 1 | Status: SHIPPED | OUTPATIENT
Start: 2024-09-11

## 2024-10-03 ENCOUNTER — OFFICE VISIT (OUTPATIENT)
Dept: FAMILY MEDICINE | Facility: CLINIC | Age: 59
End: 2024-10-03
Payer: COMMERCIAL

## 2024-10-03 VITALS
HEIGHT: 60 IN | RESPIRATION RATE: 20 BRPM | WEIGHT: 178 LBS | BODY MASS INDEX: 34.95 KG/M2 | TEMPERATURE: 97.5 F | SYSTOLIC BLOOD PRESSURE: 114 MMHG | HEART RATE: 94 BPM | OXYGEN SATURATION: 98 % | DIASTOLIC BLOOD PRESSURE: 82 MMHG

## 2024-10-03 DIAGNOSIS — E78.5 HYPERLIPIDEMIA WITH TARGET LDL LESS THAN 100: ICD-10-CM

## 2024-10-03 DIAGNOSIS — E11.65 TYPE 2 DIABETES MELLITUS WITH HYPERGLYCEMIA, WITHOUT LONG-TERM CURRENT USE OF INSULIN (H): ICD-10-CM

## 2024-10-03 DIAGNOSIS — I10 ESSENTIAL HYPERTENSION: ICD-10-CM

## 2024-10-03 DIAGNOSIS — E66.01 MORBID OBESITY (H): ICD-10-CM

## 2024-10-03 DIAGNOSIS — H65.93 MIDDLE EAR EFFUSION, BILATERAL: ICD-10-CM

## 2024-10-03 DIAGNOSIS — N18.2 CKD (CHRONIC KIDNEY DISEASE) STAGE 2, GFR 60-89 ML/MIN: ICD-10-CM

## 2024-10-03 DIAGNOSIS — Z00.00 ROUTINE GENERAL MEDICAL EXAMINATION AT A HEALTH CARE FACILITY: Primary | ICD-10-CM

## 2024-10-03 DIAGNOSIS — Z12.4 CERVICAL CANCER SCREENING: ICD-10-CM

## 2024-10-03 PROCEDURE — 99396 PREV VISIT EST AGE 40-64: CPT | Performed by: PHYSICIAN ASSISTANT

## 2024-10-03 PROCEDURE — G0145 SCR C/V CYTO,THINLAYER,RESCR: HCPCS | Performed by: PHYSICIAN ASSISTANT

## 2024-10-03 PROCEDURE — 87624 HPV HI-RISK TYP POOLED RSLT: CPT | Performed by: PHYSICIAN ASSISTANT

## 2024-10-03 PROCEDURE — 99207 PR FOOT EXAM NO CHARGE: CPT | Performed by: PHYSICIAN ASSISTANT

## 2024-10-03 PROCEDURE — 99214 OFFICE O/P EST MOD 30 MIN: CPT | Mod: 25 | Performed by: PHYSICIAN ASSISTANT

## 2024-10-03 RX ORDER — SEMAGLUTIDE 0.68 MG/ML
0.5 INJECTION, SOLUTION SUBCUTANEOUS
Qty: 3 ML | Refills: 0 | Status: CANCELLED | OUTPATIENT
Start: 2024-10-03

## 2024-10-03 RX ORDER — LISINOPRIL AND HYDROCHLOROTHIAZIDE 12.5; 2 MG/1; MG/1
2 TABLET ORAL DAILY
Qty: 180 TABLET | Refills: 1 | Status: SHIPPED | OUTPATIENT
Start: 2024-10-03

## 2024-10-03 RX ORDER — PRAVASTATIN SODIUM 20 MG
20 TABLET ORAL DAILY
Qty: 90 TABLET | Refills: 3 | Status: SHIPPED | OUTPATIENT
Start: 2024-10-03

## 2024-10-03 SDOH — HEALTH STABILITY: PHYSICAL HEALTH: ON AVERAGE, HOW MANY DAYS PER WEEK DO YOU ENGAGE IN MODERATE TO STRENUOUS EXERCISE (LIKE A BRISK WALK)?: 2 DAYS

## 2024-10-03 ASSESSMENT — SOCIAL DETERMINANTS OF HEALTH (SDOH): HOW OFTEN DO YOU GET TOGETHER WITH FRIENDS OR RELATIVES?: ONCE A WEEK

## 2024-10-03 ASSESSMENT — PAIN SCALES - GENERAL: PAINLEVEL: NO PAIN (0)

## 2024-10-03 NOTE — PATIENT INSTRUCTIONS
Patient Education   Preventive Care Advice   This is general advice given by our system to help you stay healthy. However, your care team may have specific advice just for you. Please talk to your care team about your preventive care needs.  Nutrition  Eat 5 or more servings of fruits and vegetables each day.  Try wheat bread, brown rice and whole grain pasta (instead of white bread, rice, and pasta).  Get enough calcium and vitamin D. Check the label on foods and aim for 100% of the RDA (recommended daily allowance).  Lifestyle  Exercise at least 150 minutes each week  (30 minutes a day, 5 days a week).  Do muscle strengthening activities 2 days a week. These help control your weight and prevent disease.  No smoking.  Wear sunscreen to prevent skin cancer.  Have a dental exam and cleaning every 6 months.  Yearly exams  See your health care team every year to talk about:  Any changes in your health.  Any medicines your care team has prescribed.  Preventive care, family planning, and ways to prevent chronic diseases.  Shots (vaccines)   HPV shots (up to age 26), if you've never had them before.  Hepatitis B shots (up to age 59), if you've never had them before.  COVID-19 shot: Get this shot when it's due.  Flu shot: Get a flu shot every year.  Tetanus shot: Get a tetanus shot every 10 years.  Pneumococcal, hepatitis A, and RSV shots: Ask your care team if you need these based on your risk.  Shingles shot (for age 50 and up)  General health tests  Diabetes screening:  Starting at age 35, Get screened for diabetes at least every 3 years.  If you are younger than age 35, ask your care team if you should be screened for diabetes.  Cholesterol test: At age 39, start having a cholesterol test every 5 years, or more often if advised.  Bone density scan (DEXA): At age 50, ask your care team if you should have this scan for osteoporosis (brittle bones).  Hepatitis C: Get tested at least once in your life.  STIs (sexually  transmitted infections)  Before age 24: Ask your care team if you should be screened for STIs.  After age 24: Get screened for STIs if you're at risk. You are at risk for STIs (including HIV) if:  You are sexually active with more than one person.  You don't use condoms every time.  You or a partner was diagnosed with a sexually transmitted infection.  If you are at risk for HIV, ask about PrEP medicine to prevent HIV.  Get tested for HIV at least once in your life, whether you are at risk for HIV or not.  Cancer screening tests  Cervical cancer screening: If you have a cervix, begin getting regular cervical cancer screening tests starting at age 21.  Breast cancer scan (mammogram): If you've ever had breasts, begin having regular mammograms starting at age 40. This is a scan to check for breast cancer.  Colon cancer screening: It is important to start screening for colon cancer at age 45.  Have a colonoscopy test every 10 years (or more often if you're at risk) Or, ask your provider about stool tests like a FIT test every year or Cologuard test every 3 years.  To learn more about your testing options, visit:   .  For help making a decision, visit:   https://bit.ly/ee04082.  Prostate cancer screening test: If you have a prostate, ask your care team if a prostate cancer screening test (PSA) at age 55 is right for you.  Lung cancer screening: If you are a current or former smoker ages 50 to 80, ask your care team if ongoing lung cancer screenings are right for you.  For informational purposes only. Not to replace the advice of your health care provider. Copyright   2023 [employer]. All rights reserved. Clinically reviewed by the [facility]. [employer] 898912 - REV 01/24.

## 2024-10-04 LAB
HPV HR 12 DNA CVX QL NAA+PROBE: NEGATIVE
HPV16 DNA CVX QL NAA+PROBE: NEGATIVE
HPV18 DNA CVX QL NAA+PROBE: NEGATIVE
HUMAN PAPILLOMA VIRUS FINAL DIAGNOSIS: NORMAL

## 2024-10-08 LAB
BKR AP ASSOCIATED HPV REPORT: NORMAL
BKR LAB AP GYN ADEQUACY: NORMAL
BKR LAB AP GYN INTERPRETATION: NORMAL
BKR LAB AP PREVIOUS ABNL DX: NORMAL
BKR LAB AP PREVIOUS ABNORMAL: NORMAL
PATH REPORT.COMMENTS IMP SPEC: NORMAL
PATH REPORT.COMMENTS IMP SPEC: NORMAL
PATH REPORT.RELEVANT HX SPEC: NORMAL

## 2024-10-10 ENCOUNTER — TELEPHONE (OUTPATIENT)
Dept: GASTROENTEROLOGY | Facility: CLINIC | Age: 59
End: 2024-10-10
Payer: COMMERCIAL

## 2024-10-10 DIAGNOSIS — Z12.11 SPECIAL SCREENING FOR MALIGNANT NEOPLASMS, COLON: Primary | ICD-10-CM

## 2024-10-10 NOTE — TELEPHONE ENCOUNTER
"Endoscopy Scheduling Screen    Have you had any respiratory illness or flu-like symptoms in the last 10 days?  No    What is your communication preference for Instructions and/or Bowel Prep?   MyChart    What insurance is in the chart?  Other:  [facility]    Ordering/Referring Provider: Tata Belle MD     (If ordering provider performs procedure, schedule with ordering provider unless otherwise instructed. )    BMI: Estimated body mass index is 34.76 kg/m  as calculated from the following:    Height as of 10/3/24: 1.524 m (5').    Weight as of 10/3/24: 80.7 kg (178 lb).     Sedation Ordered  moderate sedation.   If patient BMI > 50 do not schedule in [facility].    If patient BMI > 45 do not schedule at [facility].    Are you taking methadone or Suboxone?  NO, No RN review required.    Have you been diagnosed and are being treated for severe PTSD or severe anxiety?  NO, No RN review required.    Are you taking any prescription medications for pain 3 or more times per week?   NO, No RN review required.    Do you have a history of malignant hyperthermia?  No    (Females) Are you currently pregnant?   No     Have you been diagnosed or told you have pulmonary hypertension?   No    Do you have an LVAD?  No    Have you been told you have moderate to severe sleep apnea?  No.    Have you been told you have COPD, asthma, or any other lung disease?  No    Do you have any heart conditions?  No     Have you ever had or are you waiting for an organ transplant?  No. Continue scheduling, no site restrictions.    Have you had a stroke or transient ischemic attack (TIA aka \"mini stroke\" in the last 6 months?   No    Have you been diagnosed with or been told you have cirrhosis of the liver?   No.    Are you currently on dialysis?   No    Do you need assistance transferring?   No    BMI: Estimated body mass index is 34.76 kg/m  as calculated from the following:    Height as of 10/3/24: 1.524 m (5').    Weight as of 10/3/24: 80.7 kg (178 lb). "     Is patients BMI > 40 and scheduling location [facility]?  No    Do you take an injectable or oral medication for weight loss or diabetes (excluding insulin)?  Yes, hold time can be up to 7 days. Please consult with you prescribing provider to discuss endoscopy recommendations. (Please schedule at least 7 days out.)    Do you take the medication Naltrexone?  No    Do you take blood thinners?  No       Prep   Are you currently on dialysis or do you have chronic kidney disease?  No    Do you have a diagnosis of diabetes?  Yes (Golytely Prep)    Do you have a diagnosis of cystic fibrosis (CF)?  No    On a regular basis do you go 3 -5 days between bowel movements?  No    BMI > 40?  No    Preferred Pharmacy:    [id number] - [place], [place] - [address]  [address]  [address]  [facility] [address]  Phone: [phone] Fax: [phone]      Final Scheduling Details     Procedure scheduled  Colonoscopy    Surgeon:  Alla     Date of procedure:  11/11/2024     Pre-OP / PAC:   No - Not required for this site.    Location  [facility] - Patient preference.    Sedation   Moderate Sedation - Per order.      Patient Reminders:   You will receive a call from a Nurse to review instructions and health history.  This assessment must be completed prior to your procedure.  Failure to complete the Nurse assessment may result in the procedure being cancelled.      On the day of your procedure, please designate an adult(s) who can drive you home stay with you for the next 24 hours. The medicines used in the exam will make you sleepy. You will not be able to drive.      You cannot take public transportation, ride share services, or non-medical taxi service without a responsible caregiver.  Medical transport services are allowed with the requirement that a responsible caregiver will receive you at your destination.  We require that drivers and caregivers are confirmed prior to your procedure.

## 2024-10-21 RX ORDER — BISACODYL 5 MG/1
TABLET, DELAYED RELEASE ORAL
Qty: 4 TABLET | Refills: 0 | Status: SHIPPED | OUTPATIENT
Start: 2024-10-21

## 2024-10-21 NOTE — TELEPHONE ENCOUNTER
Extended Golytely Bowel Prep  recommended due to diabetes.  and GLP-1 agonist medication noted in chart.  Instructions were sent via [employer]. Bowel prep was sent 10/21/2024 to [id number] - [place], [place] - [address]

## 2024-11-11 ENCOUNTER — HOSPITAL ENCOUNTER (OUTPATIENT)
Facility: CLINIC | Age: 59
Discharge: HOME | End: 2024-11-11
Attending: INTERNAL MEDICINE | Admitting: INTERNAL MEDICINE
Payer: COMMERCIAL

## 2024-11-11 VITALS
DIASTOLIC BLOOD PRESSURE: 71 MMHG | WEIGHT: 178 LBS | OXYGEN SATURATION: 95 % | HEIGHT: 60 IN | HEART RATE: 82 BPM | RESPIRATION RATE: 16 BRPM | SYSTOLIC BLOOD PRESSURE: 113 MMHG | BODY MASS INDEX: 34.95 KG/M2

## 2024-11-11 DIAGNOSIS — Z98.890 S/P COLONOSCOPY WITH POLYPECTOMY: Primary | ICD-10-CM

## 2024-11-11 LAB
COLONOSCOPY: NORMAL
GLUCOSE BLDC GLUCOMTR-MCNC: 125 MG/DL (ref 70–99)

## 2024-11-11 PROCEDURE — 999N000099 HC STATISTIC MODERATE SEDATION < 10 MIN: Performed by: INTERNAL MEDICINE

## 2024-11-11 PROCEDURE — 250N000011 HC RX IP 250 OP 636: Performed by: INTERNAL MEDICINE

## 2024-11-11 PROCEDURE — 45378 DIAGNOSTIC COLONOSCOPY: CPT | Performed by: INTERNAL MEDICINE

## 2024-11-11 PROCEDURE — G0105 COLORECTAL SCRN; HI RISK IND: HCPCS | Performed by: INTERNAL MEDICINE

## 2024-11-11 PROCEDURE — 82962 GLUCOSE BLOOD TEST: CPT

## 2024-11-11 RX ORDER — ONDANSETRON 2 MG/ML
4 INJECTION INTRAMUSCULAR; INTRAVENOUS
Status: DISCONTINUED | OUTPATIENT
Start: 2024-11-11 | End: 2024-11-11 | Stop reason: HOSPADM

## 2024-11-11 RX ORDER — NALOXONE HYDROCHLORIDE 0.4 MG/ML
0.4 INJECTION, SOLUTION INTRAMUSCULAR; INTRAVENOUS; SUBCUTANEOUS
Status: DISCONTINUED | OUTPATIENT
Start: 2024-11-11 | End: 2024-11-11 | Stop reason: HOSPADM

## 2024-11-11 RX ORDER — PROCHLORPERAZINE MALEATE 10 MG
10 TABLET ORAL EVERY 6 HOURS PRN
Status: DISCONTINUED | OUTPATIENT
Start: 2024-11-11 | End: 2024-11-11 | Stop reason: HOSPADM

## 2024-11-11 RX ORDER — NALOXONE HYDROCHLORIDE 0.4 MG/ML
0.2 INJECTION, SOLUTION INTRAMUSCULAR; INTRAVENOUS; SUBCUTANEOUS
Status: DISCONTINUED | OUTPATIENT
Start: 2024-11-11 | End: 2024-11-11 | Stop reason: HOSPADM

## 2024-11-11 RX ORDER — ATROPINE SULFATE 0.1 MG/ML
1 INJECTION INTRAVENOUS
Status: DISCONTINUED | OUTPATIENT
Start: 2024-11-11 | End: 2024-11-11 | Stop reason: HOSPADM

## 2024-11-11 RX ORDER — FENTANYL CITRATE 50 UG/ML
50-100 INJECTION, SOLUTION INTRAMUSCULAR; INTRAVENOUS EVERY 5 MIN PRN
Status: DISCONTINUED | OUTPATIENT
Start: 2024-11-11 | End: 2024-11-11 | Stop reason: HOSPADM

## 2024-11-11 RX ORDER — DIPHENHYDRAMINE HYDROCHLORIDE 50 MG/ML
25-50 INJECTION INTRAMUSCULAR; INTRAVENOUS
Status: DISCONTINUED | OUTPATIENT
Start: 2024-11-11 | End: 2024-11-11 | Stop reason: HOSPADM

## 2024-11-11 RX ORDER — LIDOCAINE 40 MG/G
CREAM TOPICAL
Status: DISCONTINUED | OUTPATIENT
Start: 2024-11-11 | End: 2024-11-11 | Stop reason: HOSPADM

## 2024-11-11 RX ORDER — ONDANSETRON 4 MG/1
4 TABLET, ORALLY DISINTEGRATING ORAL EVERY 6 HOURS PRN
Status: DISCONTINUED | OUTPATIENT
Start: 2024-11-11 | End: 2024-11-11 | Stop reason: HOSPADM

## 2024-11-11 RX ORDER — EPINEPHRINE 1 MG/ML
0.1 INJECTION, SOLUTION, CONCENTRATE INTRAVENOUS
Status: DISCONTINUED | OUTPATIENT
Start: 2024-11-11 | End: 2024-11-11 | Stop reason: HOSPADM

## 2024-11-11 RX ORDER — FLUMAZENIL 0.1 MG/ML
0.2 INJECTION, SOLUTION INTRAVENOUS
Status: DISCONTINUED | OUTPATIENT
Start: 2024-11-11 | End: 2024-11-11 | Stop reason: HOSPADM

## 2024-11-11 RX ORDER — SIMETHICONE 40MG/0.6ML
133 SUSPENSION, DROPS(FINAL DOSAGE FORM)(ML) ORAL
Status: DISCONTINUED | OUTPATIENT
Start: 2024-11-11 | End: 2024-11-11 | Stop reason: HOSPADM

## 2024-11-11 RX ORDER — ONDANSETRON 2 MG/ML
4 INJECTION INTRAMUSCULAR; INTRAVENOUS EVERY 6 HOURS PRN
Status: DISCONTINUED | OUTPATIENT
Start: 2024-11-11 | End: 2024-11-11 | Stop reason: HOSPADM

## 2024-11-11 RX ADMIN — FENTANYL CITRATE 50 MCG: 50 INJECTION, SOLUTION INTRAMUSCULAR; INTRAVENOUS at 13:47

## 2024-11-11 RX ADMIN — MIDAZOLAM HYDROCHLORIDE 2 MG: 1 INJECTION, SOLUTION INTRAMUSCULAR; INTRAVENOUS at 13:47

## 2024-11-11 ASSESSMENT — ACTIVITIES OF DAILY LIVING (ADL): ADLS_ACUITY_SCORE: 0

## 2024-11-11 NOTE — H&P
[facility]  Pre-Endoscopy History and Physical     Vikki Medina MRN# [id number]   YOB: 1965 Age: 59 year old     Date of Procedure: 11/11/2024  Primary care provider: Tata Belle  Type of Endoscopy: colonoscopy  Reason for Procedure: screening  Type of Anesthesia Anticipated: Moderate Sedation    HPI:    Vikki is a 59 year old female who will be undergoing the above procedure.      A history and physical has been performed. The patient's medications and allergies have been reviewed. The risks and benefits of the procedure and the sedation options and risks were discussed with the patient.  All questions were answered and informed consent was obtained.      She denies a personal or family history of anesthesia complications or bleeding disorders.     Allergies   Allergen Reactions    Sulfa Antibiotics         Prior to Admission Medications   Prescriptions Last Dose Informant Patient Reported? Taking?   Continuous Blood Gluc  (FREESTYLE NELY 2 READER) CLEMENTINE   No No   Sig: Use to read blood sugars as per 's instructions.   Continuous Glucose Sensor (FREESTYLE NELY 2 SENSOR) MISC   No No   Sig: Change every 14 days.   OZEMPIC, 1 MG/DOSE, 4 MG/3ML pen   No No   Sig: INJECT 1 MG SUBCUTANEOUS EVERY 7 DAYS FOR 90 DAYS   Semaglutide, 1 MG/DOSE, (OZEMPIC) 4 MG/3ML pen   No Yes   Sig: Inject 1 mg subcutaneously every 7 days.   aspirin (ASPIRIN LOW DOSE) 81 MG EC tablet 11/9/2024  No Yes   Sig: TAKE 1 TABLET BY MOUTH EVERY DAY   bisacodyl (DULCOLAX) 5 MG EC tablet   No No   Sig: Two days prior to exam take two (2) tablets at 4pm. One day prior to exam take two (2) tablets at 4pm   lisinopril-hydrochlorothiazide (ZESTORETIC) 20-12.5 MG tablet 11/9/2024  No Yes   Sig: Take 2 tablets by mouth daily.   polyethylene glycol (GOLYTELY) 236 g suspension   No No   Sig: Take as directed. Two days before your exam fill the first container with water. Cover and  shake until mixed well. At 5:00pm drink one 8oz glass every 10-15 minutes until half (1/2) of the first container is empty. Store the remainder in the refrigerator. One day before your exam at 5:00pm drink the second half of the first container until it is gone. Before you go to bed mix the second container with water and put in refrigerator. Six hours before your check in time drink one 8oz glass every 10-15 minutes until half of container is empty. Discard the remainder of solution.   pravastatin (PRAVACHOL) 20 MG tablet 2024  No Yes   Sig: Take 1 tablet (20 mg) by mouth daily.   semaglutide (OZEMPIC, 0.25 OR 0.5 MG/DOSE,) 2 MG/3ML pen 11/3/2024  No No   Sig: INJECT 0.5 MG SUBCUTANEOUS EVERY 7 DAYS      Facility-Administered Medications: None       Patient Active Problem List   Diagnosis    Vitamin D deficiency    CARDIOVASCULAR SCREENING; LDL GOAL LESS THAN 160    S/P colonoscopy with polypectomy    Adenomatous colon polyp    Type 2 diabetes mellitus with hyperglycemia, without long-term current use of insulin (H)    Morbid obesity (H)    Hyperlipidemia with target LDL less than 100    CKD (chronic kidney disease) stage 2, GFR 60-89 ml/min    Essential hypertension    Cervical high risk HPV (human papillomavirus) test positive    Anxiety        Past Medical History:   Diagnosis Date    Anxiety 2024    Delivery normal     , no complications    Third degree hemorrhoids 2021        Past Surgical History:   Procedure Laterality Date    COLONOSCOPY N/A 6/3/2019    Procedure: COLONOSCOPY;  Surgeon: Javi Joseph MD;  Location:  GI    left arm benign tumor removed      TUBAL LIGATION         Social History     Tobacco Use    Smoking status: Former     Current packs/day: 0.00     Types: Cigarettes     Quit date: 2018     Years since quittin.8     Passive exposure: Past    Smokeless tobacco: Never    Tobacco comments:     4-5 per day   Substance Use Topics    Alcohol use:  No     Alcohol/week: 0.0 standard drinks of alcohol       Family History   Problem Relation Age of Onset    Family History Negative Mother     Hypertension Father     Colon Cancer No family hx of        REVIEW OF SYSTEMS:     5 point ROS negative except as noted above in HPI, including Gen., Resp., CV, GI &  system review.      PHYSICAL EXAM:   BP (!) 143/79   Pulse 85   Resp 10   Ht 1.524 m (5')   Wt 80.7 kg (178 lb)   LMP  (LMP Unknown)   SpO2 98%   BMI 34.76 kg/m   Estimated body mass index is 34.76 kg/m  as calculated from the following:    Height as of this encounter: 1.524 m (5').    Weight as of this encounter: 80.7 kg (178 lb).   GENERAL APPEARANCE: healthy, alert, and no distress  MENTAL STATUS: alert  AIRWAY EXAM: Mallampatti Class II (visualization of the soft palate, fauces, and uvula)  RESP: lungs clear to auscultation - no rales, rhonchi or wheezes  CV: regular rates and rhythm      DIAGNOSTICS:    Not indicated      IMPRESSION   ASA Class 2 - Mild systemic disease        PLAN:       Plan for colonoscopy. We discussed the risks, benefits and alternatives and the patient wished to proceed.    The above has been forwarded to the consulting provider.      Signed Electronically by: Stephen Gold MD  November 11, 2024    Stephen Gold MD  [facility]  Cell: [phone]  Office Phone: [phone]  Office Fax: [phone]

## 2024-11-18 ENCOUNTER — LAB (OUTPATIENT)
Dept: LAB | Facility: CLINIC | Age: 59
End: 2024-11-18
Payer: COMMERCIAL

## 2024-11-18 DIAGNOSIS — E11.65 TYPE 2 DIABETES MELLITUS WITH HYPERGLYCEMIA, WITHOUT LONG-TERM CURRENT USE OF INSULIN (H): ICD-10-CM

## 2024-11-18 DIAGNOSIS — N18.2 CKD (CHRONIC KIDNEY DISEASE) STAGE 2, GFR 60-89 ML/MIN: ICD-10-CM

## 2024-11-18 DIAGNOSIS — Z00.00 ROUTINE GENERAL MEDICAL EXAMINATION AT A HEALTH CARE FACILITY: ICD-10-CM

## 2024-11-18 LAB
CHOLEST SERPL-MCNC: 182 MG/DL
CREAT UR-MCNC: 256 MG/DL
EST. AVERAGE GLUCOSE BLD GHB EST-MCNC: 163 MG/DL
FASTING STATUS PATIENT QL REPORTED: YES
HBA1C MFR BLD: 7.3 % (ref 0–5.6)
HDLC SERPL-MCNC: 62 MG/DL
LDLC SERPL CALC-MCNC: 82 MG/DL
MICROALBUMIN UR-MCNC: 106 MG/L
MICROALBUMIN/CREAT UR: 41.41 MG/G CR (ref 0–25)
NONHDLC SERPL-MCNC: 120 MG/DL
TRIGL SERPL-MCNC: 192 MG/DL

## 2024-11-18 PROCEDURE — 80061 LIPID PANEL: CPT

## 2024-11-18 PROCEDURE — 36415 COLL VENOUS BLD VENIPUNCTURE: CPT

## 2024-11-18 PROCEDURE — 82570 ASSAY OF URINE CREATININE: CPT

## 2024-11-18 PROCEDURE — 82043 UR ALBUMIN QUANTITATIVE: CPT

## 2024-11-18 PROCEDURE — 83036 HEMOGLOBIN GLYCOSYLATED A1C: CPT

## 2024-11-26 ENCOUNTER — TELEPHONE (OUTPATIENT)
Dept: FAMILY MEDICINE | Facility: CLINIC | Age: 59
End: 2024-11-26
Payer: COMMERCIAL

## 2024-11-26 ENCOUNTER — MYC MEDICAL ADVICE (OUTPATIENT)
Dept: FAMILY MEDICINE | Facility: CLINIC | Age: 59
End: 2024-11-26
Payer: COMMERCIAL

## 2024-11-26 DIAGNOSIS — E11.65 TYPE 2 DIABETES MELLITUS WITH HYPERGLYCEMIA, WITHOUT LONG-TERM CURRENT USE OF INSULIN (H): Primary | ICD-10-CM

## 2024-11-26 NOTE — TELEPHONE ENCOUNTER
"Pt called stating that she read over Edenilson' result note from 11/18 labs. Pt stated she is willing to increase Ozempic. T'd up next level dose and pharmacy.     Pt also has concerns with her triglyceride results. Pt states she sees that it is pretty high but nothing was mentioned about it. I let her know that I will pass concern onto Edenilson for further advice and recommendations.     Routing to Edenilson.     Lab results 11/18:    \"Sid joseph- I'm so sorry for my delay with your labs.     A1C test has gone up since last time.  How are you doing with the Ozempic prescription?  We may need to increase this dose again.     There is some protein in the urine this time as well.  Keeping your blood pressure and blood sugar under good control are important for your kidneys.     If you have questions please send me a message or call the clinic at [phone]\"    JAMI Chavez, RN     [facility]    11/26/2024 at 12:47 PM    "

## 2024-11-26 NOTE — TELEPHONE ENCOUNTER
I will send in the ozempic.  Was she fasting for the labs?  Triglycerides are high but don't need medication.  Diet and exercise for that.        Edenilson

## 2024-11-26 NOTE — TELEPHONE ENCOUNTER
Called pt and LMTCB as well as sent MCM. Please make sure pt has not read MCM before recalling.     MAINE ChavezN, RN     [facility]    11/26/2024 at 1:03 PM

## 2024-11-26 NOTE — TELEPHONE ENCOUNTER
Patient calling back.  Advised of result note message and mychart message sent.  Patient was fasting.  Advised no medication needed and to work on diet and exercise for triglycerides.  Advised increased Ozempic dose sent.  Patient agrees with plan.  Fela Griffin RN

## 2024-12-23 ENCOUNTER — TELEPHONE (OUTPATIENT)
Dept: FAMILY MEDICINE | Facility: CLINIC | Age: 59
End: 2024-12-23
Payer: COMMERCIAL

## 2024-12-23 NOTE — TELEPHONE ENCOUNTER
Forms/Letter Request    Type of form/letter: FMLA - Unknown  [employer]    Is Release of Information needed?: No    Do we have the form/letter: Yes:     Who is the form from? Patient    Where did/will the form come from? Patient or family brought in       When is form/letter needed by: ASAP    How would you like the form/letter returned:     Patient Notified form requests are processed in 5-7 business days:Yes    Could we send this information to you in [facility] or would you prefer to receive a phone call?:   Patient would prefer a phone call   Okay to leave a detailed message?: Yes at Home number on file [phone] (home) or Cell number on file:    Telephone Information:   Mobile [phone]        Shreya Shah Patient Representative

## 2024-12-26 NOTE — TELEPHONE ENCOUNTER
Spoke with patient and In person Appointment scheduled for 1-7-25 to complete form    Karly River

## 2025-01-07 ENCOUNTER — OFFICE VISIT (OUTPATIENT)
Dept: FAMILY MEDICINE | Facility: CLINIC | Age: 60
End: 2025-01-07
Payer: COMMERCIAL

## 2025-01-07 VITALS
SYSTOLIC BLOOD PRESSURE: 117 MMHG | TEMPERATURE: 97.8 F | OXYGEN SATURATION: 99 % | DIASTOLIC BLOOD PRESSURE: 78 MMHG | WEIGHT: 184.4 LBS | RESPIRATION RATE: 14 BRPM | BODY MASS INDEX: 36.2 KG/M2 | HEART RATE: 87 BPM | HEIGHT: 60 IN

## 2025-01-07 DIAGNOSIS — R42 DIZZINESS: Primary | ICD-10-CM

## 2025-01-07 DIAGNOSIS — Z02.89 ENCOUNTER FOR COMPLETION OF FORM WITH PATIENT: ICD-10-CM

## 2025-01-07 PROCEDURE — G2211 COMPLEX E/M VISIT ADD ON: HCPCS | Performed by: PHYSICIAN ASSISTANT

## 2025-01-07 PROCEDURE — 99213 OFFICE O/P EST LOW 20 MIN: CPT | Performed by: PHYSICIAN ASSISTANT

## 2025-01-07 ASSESSMENT — PAIN SCALES - GENERAL: PAINLEVEL_OUTOF10: NO PAIN (0)

## 2025-01-07 NOTE — PROGRESS NOTES
Assessment & Plan     Dizziness  Ongoing.  Was determined to not be vertigo but episode of dizziness still happening a couple times each month that prevent her from driving to work.  She is willing to see Neurology for further advice so referral was placed today.  - Adult Neurology  Referral; Future    Encounter for completion of form with patient  FMLA forms filled out.            BMI  Estimated body mass index is 36.01 kg/m  as calculated from the following:    Height as of this encounter: 1.524 m (5').    Weight as of this encounter: 83.6 kg (184 lb 6.4 oz).   Weight management plan: Discussed healthy diet and exercise guidelines      The longitudinal plan of care for the diagnosis(es)/condition(s) as documented were addressed during this visit. Due to the added complexity in care, I will continue to support Vikki in the subsequent management and with ongoing continuity of care.    Subjective   Vikki is a 59 year old, presenting for the following health issues:  Forms (FMLA form)      1/7/2025     1:02 PM   Additional Questions   Roomed by Marianela MCMANUS         1/7/2025   Forms   Any forms needing to be completed Yes     History of Present Illness       Reason for visit:  FMLA form   She is taking medications regularly.       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  Lasts only a few seconds to minutes.    Tried meclizine and zofran but they didn't work.  Patient is diabetic.  Blood sugar is normal when episodes happen.    Has not done any physical therapy or seen Neurology.            Review of Systems  Constitutional, HEENT, cardiovascular, pulmonary, gi and gu systems are negative, except as otherwise noted.      Objective    /78 (BP Location: Right arm, Patient Position: Sitting, Cuff Size: Adult Large)   Pulse 87   Temp 97.8  F (36.6  C) (Oral)   Resp 14   Ht 1.524 m (5')    Wt 83.6 kg (184 lb 6.4 oz)   LMP  (LMP Unknown)   SpO2 99%   BMI 36.01 kg/m    Body mass index is 36.01 kg/m .  Physical Exam   GENERAL: alert and no distress  EYES: Eyes grossly normal to inspection, PERRL and conjunctivae and sclerae normal  MS: no gross musculoskeletal defects noted, no edema  SKIN: no suspicious lesions or rashes  NEURO: Normal strength and tone, mentation intact and speech normal  PSYCH: mentation appears normal, affect normal/bright            Signed Electronically by: Edenilson Hayes PA-C

## 2025-01-07 NOTE — TELEPHONE ENCOUNTER
Form completed at appointment.   Patient sent with original FMLA form.  Copy of form sent to [facility].    Karly River

## 2025-01-15 ENCOUNTER — MYC MEDICAL ADVICE (OUTPATIENT)
Dept: FAMILY MEDICINE | Facility: CLINIC | Age: 60
End: 2025-01-15
Payer: COMMERCIAL

## 2025-01-16 NOTE — TELEPHONE ENCOUNTER
Patient came in today 01- to  documents/forms.   Verified patient with their [place] Drivers License.     Bernadette Sarmiento  Patient Representative  [facility]

## 2025-02-22 ENCOUNTER — HEALTH MAINTENANCE LETTER (OUTPATIENT)
Age: 60
End: 2025-02-22

## 2025-03-09 ENCOUNTER — TRANSFERRED RECORDS (OUTPATIENT)
Dept: MULTI SPECIALTY CLINIC | Facility: CLINIC | Age: 60
End: 2025-03-09

## 2025-03-09 LAB — RETINOPATHY: NORMAL

## 2025-05-24 ENCOUNTER — HEALTH MAINTENANCE LETTER (OUTPATIENT)
Age: 60
End: 2025-05-24

## 2025-06-02 ENCOUNTER — PATIENT OUTREACH (OUTPATIENT)
Dept: CARE COORDINATION | Facility: CLINIC | Age: 60
End: 2025-06-02
Payer: COMMERCIAL

## 2025-06-21 DIAGNOSIS — E11.9 TYPE 2 DIABETES MELLITUS WITHOUT COMPLICATION, WITHOUT LONG-TERM CURRENT USE OF INSULIN (H): ICD-10-CM

## 2025-06-23 RX ORDER — ASPIRIN 81 MG/1
81 TABLET, COATED ORAL DAILY
Qty: 90 TABLET | Refills: 1 | Status: SHIPPED | OUTPATIENT
Start: 2025-06-23

## 2025-06-28 DIAGNOSIS — I10 ESSENTIAL HYPERTENSION: ICD-10-CM

## 2025-06-30 RX ORDER — LISINOPRIL AND HYDROCHLOROTHIAZIDE 12.5; 2 MG/1; MG/1
2 TABLET ORAL DAILY
Qty: 180 TABLET | Refills: 0 | Status: SHIPPED | OUTPATIENT
Start: 2025-06-30

## 2025-07-29 ENCOUNTER — OFFICE VISIT (OUTPATIENT)
Dept: FAMILY MEDICINE | Facility: CLINIC | Age: 60
End: 2025-07-29
Payer: COMMERCIAL

## 2025-07-29 VITALS
RESPIRATION RATE: 16 BRPM | SYSTOLIC BLOOD PRESSURE: 126 MMHG | TEMPERATURE: 98.1 F | HEIGHT: 61 IN | HEART RATE: 91 BPM | WEIGHT: 185.6 LBS | BODY MASS INDEX: 35.04 KG/M2 | OXYGEN SATURATION: 95 % | DIASTOLIC BLOOD PRESSURE: 82 MMHG

## 2025-07-29 DIAGNOSIS — Z12.31 VISIT FOR SCREENING MAMMOGRAM: ICD-10-CM

## 2025-07-29 DIAGNOSIS — D12.6 ADENOMATOUS POLYP OF COLON, UNSPECIFIED PART OF COLON: ICD-10-CM

## 2025-07-29 DIAGNOSIS — Z12.11 SCREEN FOR COLON CANCER: ICD-10-CM

## 2025-07-29 DIAGNOSIS — E11.65 TYPE 2 DIABETES MELLITUS WITH HYPERGLYCEMIA, WITHOUT LONG-TERM CURRENT USE OF INSULIN (H): Primary | ICD-10-CM

## 2025-07-29 DIAGNOSIS — N18.2 CKD (CHRONIC KIDNEY DISEASE) STAGE 2, GFR 60-89 ML/MIN: ICD-10-CM

## 2025-07-29 LAB
ALBUMIN SERPL BCG-MCNC: 4.2 G/DL (ref 3.5–5.2)
ALP SERPL-CCNC: 81 U/L (ref 40–150)
ALT SERPL W P-5'-P-CCNC: 19 U/L (ref 0–50)
ANION GAP SERPL CALCULATED.3IONS-SCNC: 11 MMOL/L (ref 7–15)
AST SERPL W P-5'-P-CCNC: 21 U/L (ref 0–45)
BILIRUB SERPL-MCNC: 0.2 MG/DL
BUN SERPL-MCNC: 17.3 MG/DL (ref 6–20)
CALCIUM SERPL-MCNC: 9.6 MG/DL (ref 8.8–10.4)
CHLORIDE SERPL-SCNC: 104 MMOL/L (ref 98–107)
CREAT SERPL-MCNC: 0.83 MG/DL (ref 0.51–0.95)
EGFRCR SERPLBLD CKD-EPI 2021: 81 ML/MIN/1.73M2
EST. AVERAGE GLUCOSE BLD GHB EST-MCNC: 171 MG/DL
GLUCOSE SERPL-MCNC: 125 MG/DL (ref 70–99)
HBA1C MFR BLD: 7.6 % (ref 0–5.6)
HCO3 SERPL-SCNC: 24 MMOL/L (ref 22–29)
POTASSIUM SERPL-SCNC: 4.1 MMOL/L (ref 3.4–5.3)
PROT SERPL-MCNC: 6.9 G/DL (ref 6.4–8.3)
SODIUM SERPL-SCNC: 139 MMOL/L (ref 135–145)

## 2025-07-29 PROCEDURE — 3079F DIAST BP 80-89 MM HG: CPT | Performed by: PHYSICIAN ASSISTANT

## 2025-07-29 PROCEDURE — 1126F AMNT PAIN NOTED NONE PRSNT: CPT | Performed by: PHYSICIAN ASSISTANT

## 2025-07-29 PROCEDURE — 36415 COLL VENOUS BLD VENIPUNCTURE: CPT | Performed by: PHYSICIAN ASSISTANT

## 2025-07-29 PROCEDURE — 3051F HG A1C>EQUAL 7.0%<8.0%: CPT | Performed by: PHYSICIAN ASSISTANT

## 2025-07-29 PROCEDURE — 80053 COMPREHEN METABOLIC PANEL: CPT | Performed by: PHYSICIAN ASSISTANT

## 2025-07-29 PROCEDURE — 3074F SYST BP LT 130 MM HG: CPT | Performed by: PHYSICIAN ASSISTANT

## 2025-07-29 PROCEDURE — 83036 HEMOGLOBIN GLYCOSYLATED A1C: CPT | Performed by: PHYSICIAN ASSISTANT

## 2025-07-29 PROCEDURE — 99214 OFFICE O/P EST MOD 30 MIN: CPT | Performed by: PHYSICIAN ASSISTANT

## 2025-07-29 PROCEDURE — G2211 COMPLEX E/M VISIT ADD ON: HCPCS | Performed by: PHYSICIAN ASSISTANT

## 2025-07-29 ASSESSMENT — PAIN SCALES - GENERAL: PAINLEVEL_OUTOF10: NO PAIN (0)

## 2025-07-29 NOTE — PROGRESS NOTES
{PROVIDER CHARTING PREFERENCE:791157}    Nisreen Florez is a 60 year old, presenting for the following health issues:  Hypertension, Recheck Medication, and Diabetes        1/7/2025     1:02 PM   Additional Questions   Roomed by Marianela PALACIOS.       Via the Health Maintenance questionnaire, the patient has reported the following services have been completed -[facility] Exam: [facility] 2025-03-09, this information has been sent to the abstraction team.  History of Present Illness       Diabetes:   She presents for follow up of diabetes.  She is checking home blood glucose three times daily.   She checks blood glucose before and after meals and at bedtime.  Blood glucose is never over 200 and never under 70.  When her blood glucose is low, the patient is asymptomatic for confusion, blurred vision, lethargy and reports not feeling dizzy, shaky, or weak.  She is concerned about other.    She is not experiencing numbness or burning in feet, excessive thirst, blurry vision, weight changes or redness, sores or blisters on feet. The patient has had a diabetic eye exam in the last 12 months. Eye exam performed on 03/2025. Location of [facility] eye exam [facility] [place].        She eats 2-3 servings of fruits and vegetables daily.She consumes 0 sweetened beverage(s) daily.She exercises with enough effort to increase her heart rate 20 to 29 minutes per day.  She exercises with enough effort to increase her heart rate 4 days per week.   She is taking medications regularly.          Hypertension Follow-up    Do you check your blood pressure regularly outside of the clinic? Yes   Are you following a low salt diet? No  Are your blood pressures ever more than 140 on the top number (systolic) OR more   than 90 on the bottom number (diastolic), for example 140/90? No    BP Readings from Last 2 Encounters:   07/29/25 126/82   01/07/25 117/78     How many servings of fruits and vegetables do you eat daily?  2-3  On average, how many sweetened  "beverages do you drink each day (Examples: soda, juice, sweet tea, etc.  Do NOT count diet or artificially sweetened beverages)?   0  How many days per week do you exercise enough to make your heart beat faster? 3 or less  How many minutes a day do you exercise enough to make your heart beat faster? 30 - 60  How many days per week do you miss taking your medication? 0    Medication Followup of ozempic   Taking Medication as prescribed: yes  Side Effects:  None  Medication Helping Symptoms:  NO      {ROS Picklists (Optional):592358}      Objective    /82 (BP Location: Right arm, Patient Position: Sitting, Cuff Size: Adult Regular)   Pulse 91   Temp 98.1  F (36.7  C) (Oral)   Resp 16   Ht 1.556 m (5' 1.25\")   Wt 84.2 kg (185 lb 9.6 oz)   LMP  (LMP Unknown)   SpO2 95%   BMI 34.78 kg/m    Body mass index is 34.78 kg/m .  Physical Exam   {Exam List (Optional):989195}    {Diagnostic Test Results (Optional):133767}        Signed Electronically by: Edenilson Hayes PA-C  {Email feedback regarding this note to [email or web address]   :[id number]}  " and no distress  NECK: no adenopathy, no asymmetry, masses, or scars  RESP: lungs clear to auscultation - no rales, rhonchi or wheezes  CV: regular rate and rhythm, normal S1 S2, no S3 or S4, no murmur, click or rub, no peripheral edema  MS: no gross musculoskeletal defects noted, no edema            Signed Electronically by: Edenilson Hayes PA-C

## 2025-07-29 NOTE — NURSING NOTE
"Chief Complaint   Patient presents with    Hypertension    Recheck Medication    Diabetes     Initial /82 (BP Location: Right arm, Patient Position: Sitting, Cuff Size: Adult Regular)   Pulse 91   Temp 98.1  F (36.7  C) (Oral)   Resp 16   Ht 1.556 m (5' 1.25\")   Wt 84.2 kg (185 lb 9.6 oz)   LMP  (LMP Unknown)   SpO2 95%   BMI 34.78 kg/m   Estimated body mass index is 34.78 kg/m  as calculated from the following:    Height as of this encounter: 1.556 m (5' 1.25\").    Weight as of this encounter: 84.2 kg (185 lb 9.6 oz).  BP completed using cuff size regular right arm    Lisa Magill, CMA    "

## 2025-07-30 ENCOUNTER — PATIENT OUTREACH (OUTPATIENT)
Dept: CARE COORDINATION | Facility: CLINIC | Age: 60
End: 2025-07-30
Payer: COMMERCIAL

## 2025-08-25 ENCOUNTER — TELEPHONE (OUTPATIENT)
Dept: GASTROENTEROLOGY | Facility: CLINIC | Age: 60
End: 2025-08-25

## 2025-08-25 ENCOUNTER — ANCILLARY PROCEDURE (OUTPATIENT)
Dept: MAMMOGRAPHY | Facility: CLINIC | Age: 60
End: 2025-08-25
Attending: PHYSICIAN ASSISTANT
Payer: COMMERCIAL

## 2025-08-25 DIAGNOSIS — Z12.11 SCREEN FOR COLON CANCER: Primary | ICD-10-CM

## 2025-08-25 DIAGNOSIS — Z12.31 VISIT FOR SCREENING MAMMOGRAM: ICD-10-CM

## 2025-08-25 PROCEDURE — 77067 SCR MAMMO BI INCL CAD: CPT | Mod: TC | Performed by: RADIOLOGY

## 2025-08-25 PROCEDURE — 77063 BREAST TOMOSYNTHESIS BI: CPT | Mod: TC | Performed by: RADIOLOGY

## 2025-08-25 RX ORDER — BISACODYL 5 MG
TABLET, DELAYED RELEASE (ENTERIC COATED) ORAL
Qty: 4 TABLET | Refills: 0 | Status: SHIPPED | OUTPATIENT
Start: 2025-08-25

## 2025-08-27 RX ORDER — ONDANSETRON 4 MG/1
TABLET, FILM COATED ORAL
Qty: 6 TABLET | Refills: 0 | Status: SHIPPED | OUTPATIENT
Start: 2025-08-27

## (undated) DEVICE — KIT ENDO TURNOVER/PROCEDURE W/CLEAN A SCOPE LINERS 103888

## (undated) RX ORDER — SIMETHICONE 40MG/0.6ML
SUSPENSION, DROPS(FINAL DOSAGE FORM)(ML) ORAL
Status: DISPENSED
Start: 2024-11-11

## (undated) RX ORDER — FENTANYL CITRATE 50 UG/ML
INJECTION, SOLUTION INTRAMUSCULAR; INTRAVENOUS
Status: DISPENSED
Start: 2019-06-03

## (undated) RX ORDER — FENTANYL CITRATE 50 UG/ML
INJECTION, SOLUTION INTRAMUSCULAR; INTRAVENOUS
Status: DISPENSED
Start: 2024-11-11